# Patient Record
Sex: FEMALE | Race: WHITE | Employment: FULL TIME | URBAN - METROPOLITAN AREA
[De-identification: names, ages, dates, MRNs, and addresses within clinical notes are randomized per-mention and may not be internally consistent; named-entity substitution may affect disease eponyms.]

---

## 2021-01-22 ENCOUNTER — HOSPITAL ENCOUNTER (INPATIENT)
Facility: CLINIC | Age: 36
LOS: 10 days | Discharge: HOME | DRG: 885 | End: 2021-02-01
Attending: PSYCHIATRY & NEUROLOGY | Admitting: PSYCHIATRY & NEUROLOGY
Payer: COMMERCIAL

## 2021-01-22 ENCOUNTER — TRANSFERRED RECORDS (OUTPATIENT)
Dept: HEALTH INFORMATION MANAGEMENT | Facility: CLINIC | Age: 36
End: 2021-01-22

## 2021-01-22 ENCOUNTER — TELEPHONE (OUTPATIENT)
Dept: BEHAVIORAL HEALTH | Facility: CLINIC | Age: 36
End: 2021-01-22

## 2021-01-22 DIAGNOSIS — F41.1 GENERALIZED ANXIETY DISORDER: ICD-10-CM

## 2021-01-22 DIAGNOSIS — K59.03 DRUG-INDUCED CONSTIPATION: ICD-10-CM

## 2021-01-22 DIAGNOSIS — E55.9 VITAMIN D INSUFFICIENCY: ICD-10-CM

## 2021-01-22 DIAGNOSIS — M54.6 MIDLINE THORACIC BACK PAIN, UNSPECIFIED CHRONICITY: ICD-10-CM

## 2021-01-22 DIAGNOSIS — F33.2 SEVERE EPISODE OF RECURRENT MAJOR DEPRESSIVE DISORDER, WITHOUT PSYCHOTIC FEATURES (H): Primary | ICD-10-CM

## 2021-01-22 DIAGNOSIS — G43.909 MIGRAINE WITHOUT STATUS MIGRAINOSUS, NOT INTRACTABLE, UNSPECIFIED MIGRAINE TYPE: ICD-10-CM

## 2021-01-22 PROBLEM — F41.9 ANXIETY: Status: ACTIVE | Noted: 2021-01-22

## 2021-01-22 PROCEDURE — 250N000013 HC RX MED GY IP 250 OP 250 PS 637: Performed by: NURSE PRACTITIONER

## 2021-01-22 PROCEDURE — 250N000013 HC RX MED GY IP 250 OP 250 PS 637: Performed by: CLINICAL NURSE SPECIALIST

## 2021-01-22 PROCEDURE — 250N000011 HC RX IP 250 OP 636: Performed by: CLINICAL NURSE SPECIALIST

## 2021-01-22 PROCEDURE — 124N000002 HC R&B MH UMMC

## 2021-01-22 PROCEDURE — 90853 GROUP PSYCHOTHERAPY: CPT

## 2021-01-22 RX ORDER — MULTIPLE VITAMINS W/ MINERALS TAB 9MG-400MCG
1 TAB ORAL DAILY
Status: ON HOLD | COMMUNITY
End: 2021-01-22

## 2021-01-22 RX ORDER — TRAZODONE HYDROCHLORIDE 100 MG/1
100 TABLET ORAL AT BEDTIME
Status: ON HOLD | COMMUNITY
End: 2021-04-26

## 2021-01-22 RX ORDER — ONDANSETRON 4 MG/1
4 TABLET, ORALLY DISINTEGRATING ORAL EVERY 6 HOURS PRN
Status: DISCONTINUED | OUTPATIENT
Start: 2021-01-22 | End: 2021-01-22

## 2021-01-22 RX ORDER — MULTIPLE VITAMINS W/ MINERALS TAB 9MG-400MCG
1 TAB ORAL DAILY
Status: DISCONTINUED | OUTPATIENT
Start: 2021-01-23 | End: 2021-02-01 | Stop reason: HOSPADM

## 2021-01-22 RX ORDER — SERTRALINE HYDROCHLORIDE 100 MG/1
100 TABLET, FILM COATED ORAL EVERY MORNING
Status: DISCONTINUED | OUTPATIENT
Start: 2021-01-23 | End: 2021-01-22

## 2021-01-22 RX ORDER — ONDANSETRON 4 MG/1
4 TABLET, ORALLY DISINTEGRATING ORAL EVERY 6 HOURS PRN
COMMUNITY
End: 2022-01-10

## 2021-01-22 RX ORDER — PROPRANOLOL HYDROCHLORIDE 40 MG/1
40 TABLET ORAL 2 TIMES DAILY
Status: ON HOLD | COMMUNITY
End: 2021-02-01

## 2021-01-22 RX ORDER — OLANZAPINE 10 MG/2ML
10 INJECTION, POWDER, FOR SOLUTION INTRAMUSCULAR 3 TIMES DAILY PRN
Status: DISCONTINUED | OUTPATIENT
Start: 2021-01-22 | End: 2021-01-23

## 2021-01-22 RX ORDER — ONDANSETRON 4 MG/1
4 TABLET, ORALLY DISINTEGRATING ORAL EVERY 6 HOURS PRN
Status: DISCONTINUED | OUTPATIENT
Start: 2021-01-22 | End: 2021-02-01 | Stop reason: HOSPADM

## 2021-01-22 RX ORDER — PROPRANOLOL HYDROCHLORIDE 10 MG/1
40 TABLET ORAL 2 TIMES DAILY
Status: DISCONTINUED | OUTPATIENT
Start: 2021-01-22 | End: 2021-02-01 | Stop reason: HOSPADM

## 2021-01-22 RX ORDER — OLANZAPINE 10 MG/1
10 TABLET ORAL 3 TIMES DAILY PRN
Status: DISCONTINUED | OUTPATIENT
Start: 2021-01-22 | End: 2021-01-23

## 2021-01-22 RX ORDER — ALBUTEROL SULFATE 90 UG/1
2 AEROSOL, METERED RESPIRATORY (INHALATION) EVERY 4 HOURS PRN
Status: DISCONTINUED | OUTPATIENT
Start: 2021-01-22 | End: 2021-02-01 | Stop reason: HOSPADM

## 2021-01-22 RX ORDER — ALBUTEROL SULFATE 90 UG/1
2 AEROSOL, METERED RESPIRATORY (INHALATION) EVERY 4 HOURS PRN
Status: ON HOLD | COMMUNITY
End: 2021-04-26

## 2021-01-22 RX ORDER — HYDROXYZINE HYDROCHLORIDE 50 MG/1
50-100 TABLET, FILM COATED ORAL EVERY 4 HOURS PRN
Status: DISCONTINUED | OUTPATIENT
Start: 2021-01-22 | End: 2021-02-01 | Stop reason: HOSPADM

## 2021-01-22 RX ORDER — ACETAMINOPHEN 325 MG/1
650 TABLET ORAL EVERY 4 HOURS PRN
Status: DISCONTINUED | OUTPATIENT
Start: 2021-01-22 | End: 2021-02-01 | Stop reason: HOSPADM

## 2021-01-22 RX ORDER — SERTRALINE HYDROCHLORIDE 100 MG/1
125 TABLET, FILM COATED ORAL EVERY MORNING
Status: ON HOLD | COMMUNITY
End: 2021-02-01

## 2021-01-22 RX ORDER — TRAZODONE HYDROCHLORIDE 100 MG/1
100 TABLET ORAL AT BEDTIME
Status: DISCONTINUED | OUTPATIENT
Start: 2021-01-22 | End: 2021-02-01 | Stop reason: HOSPADM

## 2021-01-22 RX ORDER — QUETIAPINE FUMARATE 100 MG/1
100 TABLET, FILM COATED ORAL
Status: DISCONTINUED | OUTPATIENT
Start: 2021-01-22 | End: 2021-02-01 | Stop reason: HOSPADM

## 2021-01-22 RX ORDER — MAGNESIUM HYDROXIDE/ALUMINUM HYDROXICE/SIMETHICONE 120; 1200; 1200 MG/30ML; MG/30ML; MG/30ML
30 SUSPENSION ORAL EVERY 4 HOURS PRN
Status: DISCONTINUED | OUTPATIENT
Start: 2021-01-22 | End: 2021-02-01 | Stop reason: HOSPADM

## 2021-01-22 RX ORDER — AMOXICILLIN 250 MG
1 CAPSULE ORAL 2 TIMES DAILY PRN
Status: DISCONTINUED | OUTPATIENT
Start: 2021-01-22 | End: 2021-02-01 | Stop reason: HOSPADM

## 2021-01-22 RX ADMIN — TRAZODONE HYDROCHLORIDE 100 MG: 100 TABLET ORAL at 22:13

## 2021-01-22 RX ADMIN — DIBASIC SODIUM PHOSPHATE, MONOBASIC POTASSIUM PHOSPHATE AND MONOBASIC SODIUM PHOSPHATE 250 MG: 852; 155; 130 TABLET ORAL at 22:13

## 2021-01-22 RX ADMIN — ONDANSETRON 4 MG: 4 TABLET, ORALLY DISINTEGRATING ORAL at 21:49

## 2021-01-22 RX ADMIN — HYDROXYZINE HYDROCHLORIDE 100 MG: 50 TABLET, FILM COATED ORAL at 22:13

## 2021-01-22 RX ADMIN — PROPRANOLOL HYDROCHLORIDE 40 MG: 10 TABLET ORAL at 22:13

## 2021-01-22 ASSESSMENT — MIFFLIN-ST. JEOR: SCORE: 1483.79

## 2021-01-22 ASSESSMENT — ACTIVITIES OF DAILY LIVING (ADL)
ORAL_HYGIENE: INDEPENDENT
HYGIENE/GROOMING: INDEPENDENT
DRESS: INDEPENDENT
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
## 2021-01-23 LAB
ALBUMIN SERPL-MCNC: 2.9 G/DL (ref 3.4–5)
ALP SERPL-CCNC: 34 U/L (ref 40–150)
ALT SERPL W P-5'-P-CCNC: 29 U/L (ref 0–50)
ANION GAP SERPL CALCULATED.3IONS-SCNC: 6 MMOL/L (ref 3–14)
AST SERPL W P-5'-P-CCNC: 15 U/L (ref 0–45)
B-HCG SERPL-ACNC: <1 IU/L (ref 0–5)
BASOPHILS # BLD AUTO: 0 10E9/L (ref 0–0.2)
BASOPHILS NFR BLD AUTO: 0.5 %
BILIRUB SERPL-MCNC: 0.5 MG/DL (ref 0.2–1.3)
BUN SERPL-MCNC: 13 MG/DL (ref 7–30)
CALCIUM SERPL-MCNC: 8.2 MG/DL (ref 8.5–10.1)
CHLORIDE SERPL-SCNC: 113 MMOL/L (ref 94–109)
CHOLEST SERPL-MCNC: 110 MG/DL
CO2 SERPL-SCNC: 27 MMOL/L (ref 20–32)
CREAT SERPL-MCNC: 0.77 MG/DL (ref 0.52–1.04)
DIFFERENTIAL METHOD BLD: ABNORMAL
EOSINOPHIL # BLD AUTO: 0 10E9/L (ref 0–0.7)
EOSINOPHIL NFR BLD AUTO: 0.5 %
ERYTHROCYTE [DISTWIDTH] IN BLOOD BY AUTOMATED COUNT: 12.5 % (ref 10–15)
GFR SERPL CREATININE-BSD FRML MDRD: >90 ML/MIN/{1.73_M2}
GLUCOSE SERPL-MCNC: 86 MG/DL (ref 70–99)
HCT VFR BLD AUTO: 34.4 % (ref 35–47)
HDLC SERPL-MCNC: 33 MG/DL
HGB BLD-MCNC: 11.4 G/DL (ref 11.7–15.7)
IMM GRANULOCYTES # BLD: 0 10E9/L (ref 0–0.4)
IMM GRANULOCYTES NFR BLD: 0.3 %
LDLC SERPL CALC-MCNC: 61 MG/DL
LYMPHOCYTES # BLD AUTO: 3.3 10E9/L (ref 0.8–5.3)
LYMPHOCYTES NFR BLD AUTO: 51.6 %
MAGNESIUM SERPL-MCNC: 2.1 MG/DL (ref 1.6–2.3)
MCH RBC QN AUTO: 31.3 PG (ref 26.5–33)
MCHC RBC AUTO-ENTMCNC: 33.1 G/DL (ref 31.5–36.5)
MCV RBC AUTO: 95 FL (ref 78–100)
MONOCYTES # BLD AUTO: 0.5 10E9/L (ref 0–1.3)
MONOCYTES NFR BLD AUTO: 7 %
NEUTROPHILS # BLD AUTO: 2.6 10E9/L (ref 1.6–8.3)
NEUTROPHILS NFR BLD AUTO: 40.1 %
NONHDLC SERPL-MCNC: 77 MG/DL
NRBC # BLD AUTO: 0 10*3/UL
NRBC BLD AUTO-RTO: 0 /100
PHOSPHATE SERPL-MCNC: 3.7 MG/DL (ref 2.5–4.5)
PLATELET # BLD AUTO: 161 10E9/L (ref 150–450)
POTASSIUM SERPL-SCNC: 3.7 MMOL/L (ref 3.4–5.3)
PROT SERPL-MCNC: 5.7 G/DL (ref 6.8–8.8)
RBC # BLD AUTO: 3.64 10E12/L (ref 3.8–5.2)
SODIUM SERPL-SCNC: 146 MMOL/L (ref 133–144)
TRIGL SERPL-MCNC: 78 MG/DL
TSH SERPL DL<=0.005 MIU/L-ACNC: 0.79 MU/L (ref 0.4–4)
WBC # BLD AUTO: 6.4 10E9/L (ref 4–11)

## 2021-01-23 PROCEDURE — H2032 ACTIVITY THERAPY, PER 15 MIN: HCPCS

## 2021-01-23 PROCEDURE — 36415 COLL VENOUS BLD VENIPUNCTURE: CPT | Performed by: NURSE PRACTITIONER

## 2021-01-23 PROCEDURE — 250N000013 HC RX MED GY IP 250 OP 250 PS 637: Performed by: PSYCHIATRY & NEUROLOGY

## 2021-01-23 PROCEDURE — 250N000011 HC RX IP 250 OP 636: Performed by: CLINICAL NURSE SPECIALIST

## 2021-01-23 PROCEDURE — 80053 COMPREHEN METABOLIC PANEL: CPT | Performed by: NURSE PRACTITIONER

## 2021-01-23 PROCEDURE — 83735 ASSAY OF MAGNESIUM: CPT | Performed by: NURSE PRACTITIONER

## 2021-01-23 PROCEDURE — 99222 1ST HOSP IP/OBS MODERATE 55: CPT | Mod: 95 | Performed by: CLINICAL NURSE SPECIALIST

## 2021-01-23 PROCEDURE — 99207 PR CONSULT E&M CHANGED TO SUBSEQUENT LEVEL: CPT | Performed by: NURSE PRACTITIONER

## 2021-01-23 PROCEDURE — 250N000013 HC RX MED GY IP 250 OP 250 PS 637: Performed by: CLINICAL NURSE SPECIALIST

## 2021-01-23 PROCEDURE — 85025 COMPLETE CBC W/AUTO DIFF WBC: CPT | Performed by: NURSE PRACTITIONER

## 2021-01-23 PROCEDURE — 80061 LIPID PANEL: CPT | Performed by: NURSE PRACTITIONER

## 2021-01-23 PROCEDURE — 84443 ASSAY THYROID STIM HORMONE: CPT | Performed by: NURSE PRACTITIONER

## 2021-01-23 PROCEDURE — 84100 ASSAY OF PHOSPHORUS: CPT | Performed by: NURSE PRACTITIONER

## 2021-01-23 PROCEDURE — 250N000013 HC RX MED GY IP 250 OP 250 PS 637: Performed by: NURSE PRACTITIONER

## 2021-01-23 PROCEDURE — 84702 CHORIONIC GONADOTROPIN TEST: CPT | Performed by: NURSE PRACTITIONER

## 2021-01-23 PROCEDURE — 99232 SBSQ HOSP IP/OBS MODERATE 35: CPT | Performed by: NURSE PRACTITIONER

## 2021-01-23 PROCEDURE — 124N000002 HC R&B MH UMMC

## 2021-01-23 RX ORDER — LORAZEPAM 0.5 MG/1
0.5 TABLET ORAL DAILY PRN
Status: DISCONTINUED | OUTPATIENT
Start: 2021-01-23 | End: 2021-01-25

## 2021-01-23 RX ORDER — OLANZAPINE 5 MG/1
5-10 TABLET ORAL 3 TIMES DAILY PRN
Status: DISCONTINUED | OUTPATIENT
Start: 2021-01-23 | End: 2021-02-01 | Stop reason: HOSPADM

## 2021-01-23 RX ORDER — OLANZAPINE 10 MG/2ML
10 INJECTION, POWDER, FOR SOLUTION INTRAMUSCULAR 3 TIMES DAILY PRN
Status: DISCONTINUED | OUTPATIENT
Start: 2021-01-23 | End: 2021-02-01 | Stop reason: HOSPADM

## 2021-01-23 RX ORDER — GABAPENTIN 100 MG/1
100 CAPSULE ORAL 3 TIMES DAILY
Status: DISCONTINUED | OUTPATIENT
Start: 2021-01-23 | End: 2021-01-25

## 2021-01-23 RX ORDER — ESCITALOPRAM OXALATE 10 MG/1
10 TABLET ORAL DAILY
Status: DISCONTINUED | OUTPATIENT
Start: 2021-01-24 | End: 2021-02-01 | Stop reason: HOSPADM

## 2021-01-23 RX ADMIN — TRAZODONE HYDROCHLORIDE 100 MG: 100 TABLET ORAL at 22:07

## 2021-01-23 RX ADMIN — DIBASIC SODIUM PHOSPHATE, MONOBASIC POTASSIUM PHOSPHATE AND MONOBASIC SODIUM PHOSPHATE 250 MG: 852; 155; 130 TABLET ORAL at 19:37

## 2021-01-23 RX ADMIN — MULTIPLE VITAMINS W/ MINERALS TAB 1 TABLET: TAB at 08:19

## 2021-01-23 RX ADMIN — PROPRANOLOL HYDROCHLORIDE 40 MG: 10 TABLET ORAL at 19:38

## 2021-01-23 RX ADMIN — SERTRALINE HYDROCHLORIDE 125 MG: 100 TABLET ORAL at 08:20

## 2021-01-23 RX ADMIN — ACETAMINOPHEN 650 MG: 325 TABLET, FILM COATED ORAL at 19:38

## 2021-01-23 RX ADMIN — GABAPENTIN 100 MG: 100 CAPSULE ORAL at 19:37

## 2021-01-23 RX ADMIN — ONDANSETRON 4 MG: 4 TABLET, ORALLY DISINTEGRATING ORAL at 09:03

## 2021-01-23 RX ADMIN — HYDROXYZINE HYDROCHLORIDE 100 MG: 50 TABLET, FILM COATED ORAL at 20:17

## 2021-01-23 RX ADMIN — HYDROXYZINE HYDROCHLORIDE 100 MG: 50 TABLET, FILM COATED ORAL at 09:30

## 2021-01-23 RX ADMIN — DIBASIC SODIUM PHOSPHATE, MONOBASIC POTASSIUM PHOSPHATE AND MONOBASIC SODIUM PHOSPHATE 250 MG: 852; 155; 130 TABLET ORAL at 03:51

## 2021-01-23 RX ADMIN — DIBASIC SODIUM PHOSPHATE, MONOBASIC POTASSIUM PHOSPHATE AND MONOBASIC SODIUM PHOSPHATE 250 MG: 852; 155; 130 TABLET ORAL at 09:27

## 2021-01-23 RX ADMIN — GABAPENTIN 100 MG: 100 CAPSULE ORAL at 14:33

## 2021-01-23 RX ADMIN — PROPRANOLOL HYDROCHLORIDE 40 MG: 10 TABLET ORAL at 08:20

## 2021-01-23 RX ADMIN — ONDANSETRON 4 MG: 4 TABLET, ORALLY DISINTEGRATING ORAL at 17:01

## 2021-01-23 ASSESSMENT — ACTIVITIES OF DAILY LIVING (ADL)
ORAL_HYGIENE: INDEPENDENT
HYGIENE/GROOMING: INDEPENDENT
LAUNDRY: WITH SUPERVISION
DRESS: INDEPENDENT
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
## 2021-01-23 NOTE — PROGRESS NOTES
Pt slept for 6.50 hours.  Breathing was even and unlabored.  Up at 0400,took scheduled medication without any problems.  Had no medical concerns.
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
## 2021-01-23 NOTE — PHARMACY-ADMISSION MEDICATION HISTORY
Admission Medication History Completed by Pharmacy    See [facility] Admission Navigator for allergy information, preferred outpatient pharmacy, prior to admission medications and immunization status.     Medication History Sources:     Patient    Changes made to PTA medication list (reason):    Added: None    Deleted: None    Changed:   o Sertraline 100 mg to 125 mg daily - patient reports that this was recently increased on 1/20/21.    Additional Information:    Unable to verify medications with outside records. PTA list updated solely on patient report.    [facility] fill history:  o November 2020: Trazodone take 25-50 mg at bedtime  o September 2020:  Sertraline take 50 mg daily  o May 2020: Propanolol take 20 mg at bedtime for a week, then increase by 20 mg weekly until 40 mg twice daily.    Patient reports filling at [facility] for the past couple of months now. Not open at the time, called a 24 hour [facility] and they report no history or any fills.     Prior to Admission medications    Medication Sig Last Dose Taking? Auth Provider   albuterol (PROAIR HFA/PROVENTIL HFA/VENTOLIN HFA) 108 (90 Base) MCG/ACT inhaler Inhale 2 puffs into the lungs every 4 hours as needed for shortness of breath / dyspnea or wheezing (only needed since recovering from covid several months ago) Past Month Yes Reported, Patient   Multiple Vitamins-Minerals (MULTIVITAMIN GUMMIES ADULT PO) Take 4 tablets by mouth daily 1/20/2021 Yes Reported, Patient   ondansetron (ZOFRAN-ODT) 4 MG ODT tab Take 4 mg by mouth every 6 hours as needed for nausea or vomiting Not started Yes Reported, Patient   propranolol (INDERAL) 40 MG tablet Take 40 mg by mouth 2 times daily Hold for heart rate less than 40 - patient has been bradycardic without complications since starting this medication for post-covid migraines several months ago 1/21/2021 Yes Reported, Patient   sertraline (ZOLOFT) 100 MG tablet Take 125 mg by mouth every morning  1/21/2021 Yes  Reported, Patient   traZODone (DESYREL) 100 MG tablet Take 100 mg by mouth At Bedtime 1/21/2021 Yes Reported, Patient       Date completed: 01/22/21    Medication history completed by: Dorie Boswell

## 2021-01-23 NOTE — PROGRESS NOTES
01/22/21 2041   Patient Belongings   Patient Belongings locker   Patient Belongings Put in Hospital Secure Location (Security or Locker, etc.) cash/credit card;clothing;keys;shoes   Belongings Search Yes   Clothing Search Yes   Second Staff Keyshawn Rivera Sr. Psych Associate     The following items are being kept in a secure unit locker:  One pair of winter boots (with laces)  One [employer] winter hat  One [employer] face mask  Two sets of keys  One [place] ID Card  One small bottle of eye drops  One T-shirt  One sweatshirt (with drawstring)  One pair of sweat pants (with drawstring)  $1.59 in change    The following items are being sent to [employer]: Envelope [id number]  One Visa (6472)  Two signed checks    A               Admission:  I am responsible for any personal items that are not sent to the safe or pharmacy.  [facility] is not responsible for loss, theft or damage of any property in my possession.    Signature:  _________________________________ Date: _______  Time: _____                                              Staff Signature:  ____________________________ Date: ________  Time: _____      2nd Staff person, if patient is unable/unwilling to sign:    Signature: ________________________________ Date: ________  Time: _____     Discharge:  [facility] has returned all of my personal belongings:    Signature: _________________________________ Date: ________  Time: _____                                          Staff Signature:  ____________________________ Date: ________  Time: _____
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
## 2021-01-23 NOTE — PROGRESS NOTES
CLINICAL NUTRITION SERVICES - ASSESSMENT NOTE     Nutrition Prescription    RECOMMENDATIONS FOR MDs/PROVIDERS TO ORDER:  For completeness of workup, may want to check a Vit D level     Malnutrition Status:    Unable to fully assess as no NFPA completed, but pt does likely at least qualify for non-severe malnutrition in the context of acute on chronic mental illness    Recommendations already ordered by Registered Dietitian (RD):  Chocolate Boost Plus TID at all meals    RD will update weight order to weekly     Future/Additional Recommendations:  Monitor meal intakes and supplement acceptance  Monitor weight and lab trends      REASON FOR ASSESSMENT  Mariusz Huntley is a/an 35 year old female assessed by the dietitian for Positive Admission Nutrition Risk Screen and Provider Order - Significant weight loss, nausea & vomiting    NUTRITION/MEDICAL HISTORY  Per chart review: Pt admits to facility in the setting of severe anxiety and depression, other diagnoses of note are persistent nausea/vomiting, bradycardia, hypophosphatemia, migraines, and recovered COVID 19 infection.    Per staff note pt reported a 95 lb wt loss since October 2020 due to vomiting, patient reports gagging when she is anxious.    Per pt visit: RD called unit and spoke with pt, reviewed reason for visit as above. Pt confirms above, reports a more UBW of around 200 lbs back in June, losing weight down to current weight, reports recent weights have ranged from 157-165 lbs. Pt reports eating an estimated 450 calorie per day in the setting of poor appetite from anxiety, pt reports would attempt po TID, but would only take bites of food and have accompanying nausea/gaging. Pt feels nausea/gaging is completely related to anxiety, pt notes no abdominal pain, diarrhea or other GI symptoms. Pt reports is taking water/fluids on the unit well now. RD reviewed labs below and nutrition related medications ordered, reviewed improving hydration status will  "likely correct sodium level, and noted phos replacement ordered along with MVI. RD suggested supplement interventions until po for regular meals starts to improve, pt agreeing to Boost at meals. RD reviewed the importance of improving oral intakes for overall improved health status, pt verbalized agreement and accepting of nutrition plan of care.     CURRENT NUTRITION ORDERS  Diet: Regular  Intake/Tolerance: Nothing recorded in EMR thus far    LABS  Results for STEVE GR (MRN [id number]) as of 1/23/2021 12:14   1/23/2021 08:05   Sodium 146 (H)   Potassium 3.7   Urea Nitrogen 13   Creatinine 0.77   Calcium 8.2 (L)   Anion Gap 6   Magnesium 2.1   Phosphorus 3.7   Albumin 2.9 (L)   Protein Total 5.7 (L)   Bilirubin Total 0.5   Alkaline Phosphatase 34 (L)   ALT 29   AST 15   Cholesterol 110   HDL Cholesterol 33 (L)   LDL Cholesterol Calculated 61   Non HDL Cholesterol 77   Triglycerides 78   TSH 0.79     MEDICATIONS  Zoloft, PRN Zofran, PRN Zyprexa, PRN Seroquel, Phos tablet, MVI    ANTHROPOMETRICS  Height: 170.2 cm (5' 7\")  Most Recent Weight: 75.1 kg (165 lb 9.6 oz)    IBW: 61.4 kg  BMI: Overweight BMI 25-29.9  Weight History: Pt reports a more UBW of around 200 lbs in June 2020    Weight assessment: If weight trends reported above accurate, pt has had a significant 17.5% weight loss in >6 months.     Dosing Weight: 75.1 kg    ASSESSED NUTRITION NEEDS  Estimated Energy Needs: 4143-4390 kcals/day (25 - 30 kcals/kg)  Justification: Maintenance  Estimated Protein Needs: 60-75 grams protein/day (0.8 - 1 grams of pro/kg)  Justification: Maintenance  Estimated Fluid Needs: 2255 mL/day (30 mL/kg)   Justification: Maintenance    MALNUTRITION  % Intake: < 75% for >/= 3 months (non-severe)  % Weight Loss: > 10% in 6 months (severe)  Subcutaneous Fat Loss: Unable to assess  Muscle Loss: Unable to assess  Fluid Accumulation/Edema: None noted  Malnutrition Diagnosis: Unable to fully assess as no NFPA completed, but pt " does likely at least qualify for non-severe malnutrition in the context of acute on chronic mental illness     NUTRITION DIAGNOSIS  Inadequate protein-energy intake related to mental illness as evidenced by pt report with a significant 17.5% weight loss in >6 months    INTERVENTIONS  Implementation  Nutrition Education: RD reviewed the importance of adequate nutritional intakes for improved health status    Medical food supplement therapy - ordered as above      Goals  Patient to consume % of nutritionally adequate meal trays TID, or the equivalent with supplements/snacks.     Monitoring/Evaluation  Progress toward goals will be monitored and evaluated per protocol.    Natty Parnell RD, LD  [facility] RD pager: [phone]

## 2021-01-24 LAB
ANION GAP SERPL CALCULATED.3IONS-SCNC: 6 MMOL/L (ref 3–14)
BUN SERPL-MCNC: 15 MG/DL (ref 7–30)
CALCIUM SERPL-MCNC: 8.2 MG/DL (ref 8.5–10.1)
CHLORIDE SERPL-SCNC: 111 MMOL/L (ref 94–109)
CO2 SERPL-SCNC: 27 MMOL/L (ref 20–32)
CREAT SERPL-MCNC: 0.79 MG/DL (ref 0.52–1.04)
GFR SERPL CREATININE-BSD FRML MDRD: >90 ML/MIN/{1.73_M2}
GLUCOSE SERPL-MCNC: 88 MG/DL (ref 70–99)
PHOSPHATE SERPL-MCNC: 4.1 MG/DL (ref 2.5–4.5)
POTASSIUM SERPL-SCNC: 3.4 MMOL/L (ref 3.4–5.3)
SODIUM SERPL-SCNC: 144 MMOL/L (ref 133–144)

## 2021-01-24 PROCEDURE — 250N000011 HC RX IP 250 OP 636: Performed by: CLINICAL NURSE SPECIALIST

## 2021-01-24 PROCEDURE — 84100 ASSAY OF PHOSPHORUS: CPT | Performed by: NURSE PRACTITIONER

## 2021-01-24 PROCEDURE — G0177 OPPS/PHP; TRAIN & EDUC SERV: HCPCS

## 2021-01-24 PROCEDURE — 80048 BASIC METABOLIC PNL TOTAL CA: CPT | Performed by: NURSE PRACTITIONER

## 2021-01-24 PROCEDURE — 250N000013 HC RX MED GY IP 250 OP 250 PS 637: Performed by: NURSE PRACTITIONER

## 2021-01-24 PROCEDURE — 82306 VITAMIN D 25 HYDROXY: CPT | Performed by: NURSE PRACTITIONER

## 2021-01-24 PROCEDURE — 124N000002 HC R&B MH UMMC

## 2021-01-24 PROCEDURE — 250N000013 HC RX MED GY IP 250 OP 250 PS 637: Performed by: CLINICAL NURSE SPECIALIST

## 2021-01-24 PROCEDURE — 36415 COLL VENOUS BLD VENIPUNCTURE: CPT | Performed by: NURSE PRACTITIONER

## 2021-01-24 RX ADMIN — PROPRANOLOL HYDROCHLORIDE 40 MG: 10 TABLET ORAL at 20:11

## 2021-01-24 RX ADMIN — GABAPENTIN 100 MG: 100 CAPSULE ORAL at 20:11

## 2021-01-24 RX ADMIN — GABAPENTIN 100 MG: 100 CAPSULE ORAL at 07:57

## 2021-01-24 RX ADMIN — TRAZODONE HYDROCHLORIDE 100 MG: 100 TABLET ORAL at 22:26

## 2021-01-24 RX ADMIN — ONDANSETRON 4 MG: 4 TABLET, ORALLY DISINTEGRATING ORAL at 18:16

## 2021-01-24 RX ADMIN — HYDROXYZINE HYDROCHLORIDE 100 MG: 50 TABLET, FILM COATED ORAL at 12:59

## 2021-01-24 RX ADMIN — MULTIPLE VITAMINS W/ MINERALS TAB 1 TABLET: TAB at 07:57

## 2021-01-24 RX ADMIN — GABAPENTIN 100 MG: 100 CAPSULE ORAL at 13:00

## 2021-01-24 RX ADMIN — HYDROXYZINE HYDROCHLORIDE 100 MG: 50 TABLET, FILM COATED ORAL at 20:11

## 2021-01-24 RX ADMIN — SERTRALINE HYDROCHLORIDE 75 MG: 50 TABLET ORAL at 07:57

## 2021-01-24 RX ADMIN — ONDANSETRON 4 MG: 4 TABLET, ORALLY DISINTEGRATING ORAL at 07:57

## 2021-01-24 RX ADMIN — ESCITALOPRAM OXALATE 10 MG: 10 TABLET ORAL at 07:56

## 2021-01-24 RX ADMIN — PROPRANOLOL HYDROCHLORIDE 40 MG: 10 TABLET ORAL at 07:56

## 2021-01-24 ASSESSMENT — ACTIVITIES OF DAILY LIVING (ADL)
ORAL_HYGIENE: INDEPENDENT
DRESS: INDEPENDENT
HYGIENE/GROOMING: SHOWER;INDEPENDENT
ORAL_HYGIENE: INDEPENDENT
DRESS: INDEPENDENT
LAUNDRY: WITH SUPERVISION
HYGIENE/GROOMING: INDEPENDENT

## 2021-01-24 ASSESSMENT — MIFFLIN-ST. JEOR: SCORE: 1488.78

## 2021-01-24 NOTE — PROGRESS NOTES
Mariusz participated in a creative journaling group this evening, actively reflecting on the prompts offered and sharing several of her written reflections with the group. Comments superficial but relevant. Pt was polite and agreeable throughout session.      01/24/21 1700   Occupational Therapy   Type of Intervention structured groups   Response Initiates, socially acceptable   Hours 1

## 2021-01-24 NOTE — PLAN OF CARE
"  Problem: Mood Impairment (Anxiety Signs/Symptoms)  Goal: Improved Mood Symptoms (Anxiety Signs/Symptoms)  Outcome: Improving  Flowsheets (Taken 1/24/2021 0002)  Mutually Determined Action Steps (Improved Mood Symptoms):    adheres to medication regimen    shares insight re: need for meds   Patient reported having a busy day with much new information to process.  She denied suicidal ideation, hallucinations, and medication side effects; said she was pleased with zoloft/celexa cross taper plan and addition of Gabapentin.  Patient was given PRN zofran prior to dinner and reported she was able to eat \"all the lasagna and the boost.  What an improvement - I'd been averaging 450 calories a day due to nausea.\"  She received tylenol for headache at 1938 and reported relief later. Patient left group at 2015 and said her anxiety was very high.  She was given ice water and Vistaril 100 mg and returned to group as they were coloring, which is one of her anxiety reduction skills.  Patient later reported anxiety had been reduced to manageable levels.  She was encouraged to notify staff if she awakens in the middle of the night so she can obtain PRN Seroquel.  Order obtained per RD suggestion for Vitamin D level tomorrow.  "
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
## 2021-01-24 NOTE — PLAN OF CARE
"  Problem: General Rehab Plan of Care  Goal: Therapeutic Recreation/Music Therapy Goal (Art Therapy)  Description: The patient and/or their representative will achieve their patient-specific goals related to the plan of care.  The patient-specific goals include: emotional expression  Outcome: No Change   AT directive: \"Feelings in Colors.\" Pts are encouraged to express eight suggested feelings with art medium of choice using lines, shapes and colors.  Goals of directive: general mindfulness, mindfulness of emotions, emotional expression     Pt was a quiet participant, focused on task for the full duration of group. Pt at one point excused herself from group saying that she felt anxious and needed to get medication from her nurse. Pt shortly later returned to group and finished feelings drawings. Pt briefly shared her imagery with group.  Pts mood was calm, pleasant participant.  "

## 2021-01-25 LAB — DEPRECATED CALCIDIOL+CALCIFEROL SERPL-MC: 17 UG/L (ref 20–75)

## 2021-01-25 PROCEDURE — G0177 OPPS/PHP; TRAIN & EDUC SERV: HCPCS

## 2021-01-25 PROCEDURE — 250N000013 HC RX MED GY IP 250 OP 250 PS 637: Performed by: NURSE PRACTITIONER

## 2021-01-25 PROCEDURE — H2032 ACTIVITY THERAPY, PER 15 MIN: HCPCS

## 2021-01-25 PROCEDURE — 124N000002 HC R&B MH UMMC

## 2021-01-25 PROCEDURE — 250N000011 HC RX IP 250 OP 636: Performed by: CLINICAL NURSE SPECIALIST

## 2021-01-25 PROCEDURE — 99232 SBSQ HOSP IP/OBS MODERATE 35: CPT | Performed by: NURSE PRACTITIONER

## 2021-01-25 PROCEDURE — 250N000013 HC RX MED GY IP 250 OP 250 PS 637: Performed by: CLINICAL NURSE SPECIALIST

## 2021-01-25 RX ORDER — VITAMIN B COMPLEX
50 TABLET ORAL DAILY
Status: DISCONTINUED | OUTPATIENT
Start: 2021-01-25 | End: 2021-02-01 | Stop reason: HOSPADM

## 2021-01-25 RX ORDER — GABAPENTIN 300 MG/1
300 CAPSULE ORAL 3 TIMES DAILY
Status: DISCONTINUED | OUTPATIENT
Start: 2021-01-25 | End: 2021-01-27

## 2021-01-25 RX ADMIN — MULTIPLE VITAMINS W/ MINERALS TAB 1 TABLET: TAB at 08:57

## 2021-01-25 RX ADMIN — GABAPENTIN 300 MG: 300 CAPSULE ORAL at 20:00

## 2021-01-25 RX ADMIN — GABAPENTIN 300 MG: 300 CAPSULE ORAL at 14:03

## 2021-01-25 RX ADMIN — SERTRALINE HYDROCHLORIDE 75 MG: 50 TABLET ORAL at 08:57

## 2021-01-25 RX ADMIN — PROPRANOLOL HYDROCHLORIDE 40 MG: 10 TABLET ORAL at 20:00

## 2021-01-25 RX ADMIN — HYDROXYZINE HYDROCHLORIDE 100 MG: 50 TABLET, FILM COATED ORAL at 20:00

## 2021-01-25 RX ADMIN — HYDROXYZINE HYDROCHLORIDE 100 MG: 50 TABLET, FILM COATED ORAL at 07:14

## 2021-01-25 RX ADMIN — PROPRANOLOL HYDROCHLORIDE 40 MG: 10 TABLET ORAL at 08:57

## 2021-01-25 RX ADMIN — Medication 50 MCG: at 14:03

## 2021-01-25 RX ADMIN — TRAZODONE HYDROCHLORIDE 100 MG: 100 TABLET ORAL at 22:36

## 2021-01-25 RX ADMIN — GABAPENTIN 300 MG: 300 CAPSULE ORAL at 08:57

## 2021-01-25 RX ADMIN — ESCITALOPRAM OXALATE 10 MG: 10 TABLET ORAL at 08:57

## 2021-01-25 RX ADMIN — ACETAMINOPHEN 650 MG: 325 TABLET, FILM COATED ORAL at 10:29

## 2021-01-25 RX ADMIN — ONDANSETRON 4 MG: 4 TABLET, ORALLY DISINTEGRATING ORAL at 07:01

## 2021-01-25 ASSESSMENT — ACTIVITIES OF DAILY LIVING (ADL)
HYGIENE/GROOMING: INDEPENDENT
DRESS: INDEPENDENT
ORAL_HYGIENE: INDEPENDENT
ORAL_HYGIENE: INDEPENDENT
HYGIENE/GROOMING: INDEPENDENT
LAUNDRY: WITH SUPERVISION
DRESS: INDEPENDENT

## 2021-01-25 NOTE — PLAN OF CARE
Initial OT Note:    Attended 2 OT Creative Expression Groups. Initiated groups and tasks. Participated x 45 minutes each session with 3 others present. Bright, pleasant and social/engaged. Decisive and able to follow 3 step verbal directions with good results. Attentive to details, planning and organization of simple task. Noted the benefits she derives from creative expression participation here, at home and with kids. Noted she was feeling overwhelmed with responsibilities. Had positive outlook and was looking forward to refreshing and learning healthy coping skills.     Self Assessment Form:  Pt was given and completed a written self assessment. Noted she had been experiencing the following: anxiety, racing thoughts, cognitive deficits, behaviors she felt compelled to do and forced vomiting prior to admission. Goals identified included: identifying and expressing feelings, look at self destructive behaviors, find supports/resources, improve concentration, manage anxiety and resume functioning. Supports identified as: , friends and parents.    OT purpose was explained with a value of having involvement in tx plan, and provided options to meet self identified goals. Will assess further in the areas of organization, problem solving, and concentration.     Will continue to assess, encourage group attendance/participation for increased self awareness and exploration of healthy coping skills.
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
## 2021-01-25 NOTE — PLAN OF CARE
Pt attended mh education group. Discussed the autobiography in 5 chapters poem and then stages of change. Pt participated actively, discussed change they wanted to make in their life and action they could take to create change.

## 2021-01-25 NOTE — PLAN OF CARE
Problem: Mood Impairment (Anxiety Signs/Symptoms)  Goal: Improved Mood Symptoms (Anxiety Signs/Symptoms)  Outcome: Improving  Flowsheets (Taken 1/25/2021 0014)  Mutually Determined Action Steps (Improved Mood Symptoms):    adheres to medication regimen    shares insight re: need for meds   Patient reported a low stress day; enjoyed watching football games on tv.  She requested PRN Vistaril at almost the same time (six minute difference) that she had the panic attack yesterday evening and had to leave group.  She again reported relief from Vistaril.  Patient stated she often has increased anxiety or panic attack at that time at home; unable to cite specific trigger other than lots of activity at this time of the evening.  She continued to deny suicidal ideation, stated depression is low and she has no new physical symptoms since starting Celexa.  Patient again received PRN zofran before dinner.  She was able to eat about 25% of the entree but ate well later when snacks were served.  Patient was strongly encouraged to request prn seroquel if she has nightmares again tonight.
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
## 2021-01-25 NOTE — PROGRESS NOTES
"   01/25/21 0826   Sitting Orthostatic BP   Sitting Orthostatic /55   Sitting Orthostatic Pulse 51 bpm   Standing Orthostatic BP   Standing Orthostatic /73   Standing Orthostatic Pulse 54 bpm     Patient reported some sx of feeling dizzy/lightheaded from sitting to standing this am. Did have prn hydroxyzine and ondansetron earlier this am for nausea and anxiety with improvement in those sx. Although she still met ordered parameters for given scheduled propanolol, did discuss with provider and ok'd to give scheduled dose.   Instructed patient to increase oral fluids and change positions slowly to prevent fall.  She was able to eat a full breakfast today and reported feeling better afterwards. Discussed medication dose changes today. She has been tolerating these without reported SEs. Was able to eat lunch without prn medication for nausea ahead of time and had no issues with nausea afterwards.     She is alert and fully oriented. Mood is anxious. Affect tense but pleasant. She denies any SI or self harm ideation. Had some mid-upper back tightness \"like I pulled a muscle there\" and prn Tylenol given. No vomiting this shift. Has been attending and participating in groups. Social with peers. Reported \"vivid dreams\" last night but no nightmares. No further reports of lightheadedness. Reported that her anxiety seemed greatly improved with increased dose of scheduled gabapentin. Vitamin D replacement ordered and started for low vitamin D level.   "

## 2021-01-25 NOTE — PROGRESS NOTES
"Patient woke up at 0700 and reported she felt \"nauseous and anxious\". Patient requested PRN zofran for nausea and hydroxyzine for anxiety. She stated she wanted to take the zofran first and would request the hydroxyzine once the nausea had decreased. PRN zofran administered as requested/ordered (see MAR). Patient reconfirmed she would communicate with the nurses when she was ready to take hydroxyzine, if needed. Will continue to monitor.    At approximately 0715, patient stated she was \"ready for the hydroxyzine\" to help with her anxiety. PRN hydroxyzine administered as ordered (see MAR).  "

## 2021-01-26 PROCEDURE — 250N000013 HC RX MED GY IP 250 OP 250 PS 637: Performed by: PSYCHIATRY & NEUROLOGY

## 2021-01-26 PROCEDURE — 124N000002 HC R&B MH UMMC

## 2021-01-26 PROCEDURE — G0177 OPPS/PHP; TRAIN & EDUC SERV: HCPCS

## 2021-01-26 PROCEDURE — 250N000013 HC RX MED GY IP 250 OP 250 PS 637: Performed by: NURSE PRACTITIONER

## 2021-01-26 PROCEDURE — 250N000013 HC RX MED GY IP 250 OP 250 PS 637: Performed by: CLINICAL NURSE SPECIALIST

## 2021-01-26 PROCEDURE — 99232 SBSQ HOSP IP/OBS MODERATE 35: CPT | Performed by: NURSE PRACTITIONER

## 2021-01-26 RX ORDER — LANOLIN ALCOHOL/MO/W.PET/CERES
3-6 CREAM (GRAM) TOPICAL
Status: DISCONTINUED | OUTPATIENT
Start: 2021-01-26 | End: 2021-02-01 | Stop reason: HOSPADM

## 2021-01-26 RX ORDER — CYCLOBENZAPRINE HCL 5 MG
5 TABLET ORAL 3 TIMES DAILY PRN
Status: DISCONTINUED | OUTPATIENT
Start: 2021-01-26 | End: 2021-02-01 | Stop reason: HOSPADM

## 2021-01-26 RX ADMIN — Medication 50 MCG: at 08:52

## 2021-01-26 RX ADMIN — TRAZODONE HYDROCHLORIDE 100 MG: 100 TABLET ORAL at 21:43

## 2021-01-26 RX ADMIN — GABAPENTIN 300 MG: 300 CAPSULE ORAL at 21:42

## 2021-01-26 RX ADMIN — MULTIPLE VITAMINS W/ MINERALS TAB 1 TABLET: TAB at 08:52

## 2021-01-26 RX ADMIN — PROPRANOLOL HYDROCHLORIDE 40 MG: 10 TABLET ORAL at 21:43

## 2021-01-26 RX ADMIN — HYDROXYZINE HYDROCHLORIDE 100 MG: 50 TABLET, FILM COATED ORAL at 12:59

## 2021-01-26 RX ADMIN — GABAPENTIN 300 MG: 300 CAPSULE ORAL at 15:07

## 2021-01-26 RX ADMIN — GABAPENTIN 300 MG: 300 CAPSULE ORAL at 08:52

## 2021-01-26 RX ADMIN — HYDROXYZINE HYDROCHLORIDE 100 MG: 50 TABLET, FILM COATED ORAL at 19:51

## 2021-01-26 RX ADMIN — CYCLOBENZAPRINE HYDROCHLORIDE 5 MG: 5 TABLET, FILM COATED ORAL at 19:23

## 2021-01-26 RX ADMIN — PROPRANOLOL HYDROCHLORIDE 40 MG: 10 TABLET ORAL at 08:52

## 2021-01-26 RX ADMIN — MELATONIN TAB 3 MG 3 MG: 3 TAB at 21:43

## 2021-01-26 RX ADMIN — SERTRALINE HYDROCHLORIDE 50 MG: 50 TABLET ORAL at 08:52

## 2021-01-26 RX ADMIN — HYDROXYZINE HYDROCHLORIDE 100 MG: 50 TABLET, FILM COATED ORAL at 03:53

## 2021-01-26 RX ADMIN — ESCITALOPRAM OXALATE 10 MG: 10 TABLET ORAL at 08:52

## 2021-01-26 RX ADMIN — ACETAMINOPHEN 650 MG: 325 TABLET, FILM COATED ORAL at 11:07

## 2021-01-26 ASSESSMENT — ACTIVITIES OF DAILY LIVING (ADL)
DRESS: SCRUBS (BEHAVIORAL HEALTH)
LAUNDRY: WITH SUPERVISION
HYGIENE/GROOMING: INDEPENDENT
ORAL_HYGIENE: INDEPENDENT
ORAL_HYGIENE: INDEPENDENT
DRESS: INDEPENDENT
HYGIENE/GROOMING: INDEPENDENT
LAUNDRY: WITH SUPERVISION

## 2021-01-26 ASSESSMENT — MIFFLIN-ST. JEOR: SCORE: 1481.97

## 2021-01-26 NOTE — PLAN OF CARE
Pt up to counter reporting anxiety due to nightmare and requested medication. Hydroxyzine prn given. Pt polite, returned to her room and laid down.

## 2021-01-26 NOTE — PLAN OF CARE
"  Problem: Mood Impairment (Anxiety Signs/Symptoms)  Goal: Improved Mood Symptoms (Anxiety Signs/Symptoms)  Outcome: Improving  Note: Patient rated anxiety at 4/10, said she may ask for Hydroxyzine later, if her anxiety gets bad.   Denied suicidal thought, thoughts of hurting self or others. Denied depression, hallucinations, or paranoia.     Patient's main concerns were verbalized as:   1. Problems with sleep at night, \"I sleep maximum of 4 hrs per night\". Patient is on Trazodone 100 mg scheduled at HS. Will consider asking the provider for PRN Melatonin.  2. Having high anxiety. Talked to patient about using alternative to medications interventions, including squire breathing, guided imagery, and meditation. Patient was receptive to try.   3. Muscle spasms at the left mid back area. Patient is asking for medications, will consider talking with provider about PRN muscle relaxant.     Patient's plan for tonight: to read and watch the Wild game on TV.   Patient did not have question for staff at this time.     She was encouraged to come to the med room for scheduled and PRN medications, she was agreeable.   Patient denied side effects of prescribed medications, none assessed at this time by staff.     Addendum: called and discussed patient's requests with on call practitioner. Orders for PRN Flexeril 5 mg PO TID PRN for muscle spasms and Melatonin 3-6 mg PO PRN at HS.   Patient took PRN Flexeril, after she rated her muscle spasm pain at 8/10 with movement, will f/u with effectiveness. She is agreeable to take Melatonin 3 mg with HS medications tonight.      Addendum: patient reported relief of her back spasm after she took PRN Flexeril.  Patient took PRN Hydroxyzine 100 mg for increased anxiety at 1951, reported that it works well for decreasing anxiety.     Addendum: patient took scheduled medications at 21:45 per her request with PRN Melatonin 3 mg PO and went to bed.        "

## 2021-01-26 NOTE — PLAN OF CARE
Attended 2 OT Creative Expression Groups, 45 minutes each session, with 2 peers present. Bright, pleasant and social. Verbalized her need to integrate creative expression activities more often when returning home. Further noted how it helps distract, promotes productive /purposeful use of time and satisfaction with out come. Eager to try new specific activity upon returning home. Futuristic goals and plans. Attentive, focused, organized and good execution of executive skills.    Afternoon group canceled due to peer divulging personal crisis info.that needed to be addressed immediately. Patient was supportive and encouraging to peer. Upon follow up noted she was good and expressed more concern for peers well being. Encouraged to ask staff for individual time if the need arises into the evening.

## 2021-01-26 NOTE — PLAN OF CARE
"Patient was jubilant after finishing supper - \"first time in months I've been able to eat the whole meal.\"  She did not receive PRN zofran prior to the meal.  Patient reports anxiety has diminished to only 2 out of 10 after receiving two doses of Gabapentin 300 mg - \"feel like I've turned the corner.\"  She continues to deny SI, new physical problems, medication side effects, and thought problems.  Affect is appropriately bright.  Patient checked out weighted blanket for use tonight in hopes it will diminish the vivid dreams that wake her up.  She stated she's enjoyed unit groups very much, in particular the CTC group today.  \"That poem really resonated with me.  I'm going to make a copy and hang it on my wall at home.\"  At 2000 patient requested and received PRN Vistaril 100 mg for report of sudden severe anxiety.  She received the same medication at 2009 and 2017 the last two evenings but cannot identify specific trigger.  Unit milieu was very calm and quiet tonight.  Vistaril again effective.  "
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
## 2021-01-26 NOTE — PLAN OF CARE
Patient said that she had very vivid dreams again last night. She endorsed anxiety at 1300 and was given Hydroxyzine 100 mg prn. Patient has been going to groups. She was compliant with all of her scheduled medications. Patient denied nausea this morning, but felt a little bit nauseated at 1300 when she was having anxiety. She put her sea bands back on and this writer told patient to ask for Zofran prn if she needed it. Patient was given Tylenol prn this morning for back ache with good relief.  Will continue to monitor and offer support.

## 2021-01-26 NOTE — PLAN OF CARE
Pt actively participated in a structured Therapeutic Recreation group with a focus on leisure participation, stress reduction, and social engagement via an active group game. Pt remained focused and engaged throughout full duration of group.  Pt was sociable and was appropriate with interactions.  Appropriately shared sense of humor with peers during the group and appeared to brighten with social interaction. Pt also was encouraging others during their turns as well. Pt was often laughing and smiling with peers during the group. Pt was very active in the tossing large, foam dice for the activity. Pt accepted some guidance, but was able to take several turns independently towards the end. Pt stated she had a lot of fun playing the game.

## 2021-01-27 PROCEDURE — 250N000013 HC RX MED GY IP 250 OP 250 PS 637: Performed by: PSYCHIATRY & NEUROLOGY

## 2021-01-27 PROCEDURE — 250N000013 HC RX MED GY IP 250 OP 250 PS 637: Performed by: NURSE PRACTITIONER

## 2021-01-27 PROCEDURE — 90853 GROUP PSYCHOTHERAPY: CPT

## 2021-01-27 PROCEDURE — 99232 SBSQ HOSP IP/OBS MODERATE 35: CPT | Performed by: NURSE PRACTITIONER

## 2021-01-27 PROCEDURE — G0177 OPPS/PHP; TRAIN & EDUC SERV: HCPCS

## 2021-01-27 PROCEDURE — 124N000002 HC R&B MH UMMC

## 2021-01-27 PROCEDURE — 250N000013 HC RX MED GY IP 250 OP 250 PS 637: Performed by: CLINICAL NURSE SPECIALIST

## 2021-01-27 RX ORDER — IBUPROFEN 600 MG/1
600 TABLET, FILM COATED ORAL EVERY 6 HOURS PRN
Status: DISCONTINUED | OUTPATIENT
Start: 2021-01-27 | End: 2021-02-01 | Stop reason: HOSPADM

## 2021-01-27 RX ORDER — GABAPENTIN 400 MG/1
400 CAPSULE ORAL 3 TIMES DAILY
Status: DISCONTINUED | OUTPATIENT
Start: 2021-01-27 | End: 2021-01-29

## 2021-01-27 RX ADMIN — CYCLOBENZAPRINE HYDROCHLORIDE 5 MG: 5 TABLET, FILM COATED ORAL at 07:52

## 2021-01-27 RX ADMIN — HYDROXYZINE HYDROCHLORIDE 100 MG: 50 TABLET, FILM COATED ORAL at 05:09

## 2021-01-27 RX ADMIN — HYDROXYZINE HYDROCHLORIDE 100 MG: 50 TABLET, FILM COATED ORAL at 20:25

## 2021-01-27 RX ADMIN — MULTIPLE VITAMINS W/ MINERALS TAB 1 TABLET: TAB at 07:52

## 2021-01-27 RX ADMIN — ACETAMINOPHEN 650 MG: 325 TABLET, FILM COATED ORAL at 07:59

## 2021-01-27 RX ADMIN — ESCITALOPRAM OXALATE 10 MG: 10 TABLET ORAL at 07:52

## 2021-01-27 RX ADMIN — CYCLOBENZAPRINE HYDROCHLORIDE 5 MG: 5 TABLET, FILM COATED ORAL at 17:55

## 2021-01-27 RX ADMIN — GABAPENTIN 400 MG: 400 CAPSULE ORAL at 13:09

## 2021-01-27 RX ADMIN — SERTRALINE HYDROCHLORIDE 50 MG: 50 TABLET ORAL at 07:52

## 2021-01-27 RX ADMIN — GABAPENTIN 300 MG: 300 CAPSULE ORAL at 07:52

## 2021-01-27 RX ADMIN — GABAPENTIN 400 MG: 400 CAPSULE ORAL at 20:25

## 2021-01-27 RX ADMIN — ACETAMINOPHEN 650 MG: 325 TABLET, FILM COATED ORAL at 13:10

## 2021-01-27 RX ADMIN — MELATONIN TAB 3 MG 3 MG: 3 TAB at 21:12

## 2021-01-27 RX ADMIN — PROPRANOLOL HYDROCHLORIDE 40 MG: 10 TABLET ORAL at 21:12

## 2021-01-27 RX ADMIN — Medication 50 MCG: at 07:52

## 2021-01-27 RX ADMIN — TRAZODONE HYDROCHLORIDE 100 MG: 100 TABLET ORAL at 21:12

## 2021-01-27 RX ADMIN — HYDROXYZINE HYDROCHLORIDE 100 MG: 50 TABLET, FILM COATED ORAL at 15:04

## 2021-01-27 ASSESSMENT — ACTIVITIES OF DAILY LIVING (ADL)
DRESS: INDEPENDENT
LAUNDRY: WITH SUPERVISION
LAUNDRY: WITH SUPERVISION
ORAL_HYGIENE: INDEPENDENT
HYGIENE/GROOMING: INDEPENDENT
DRESS: INDEPENDENT
ORAL_HYGIENE: INDEPENDENT
HYGIENE/GROOMING: INDEPENDENT
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
## 2021-01-27 NOTE — PLAN OF CARE
Attended  OT Creative Expression Groups. Initiated and actively participated in completion of creative task. Social with peers while focusing on task x 45 minutes each session. Futuristic in conversation and plans. Supportive of peers and discussed improved self management strategies to make positive changes.

## 2021-01-27 NOTE — PROGRESS NOTES
Behavioral Health  Note    Behavioral Health  Spirituality Group Note    [facility]    Name: Mariusz Huntley YOB: 1985   MRN: [id number] Age: 35 year old      Patient attended -led group, which included discussion of spirituality, coping with illness and building resilience.    Patient attended group for 1 hrs.    The patient actively participated in group discussion and patient demonstrated an appreciation of topic's application for their personal circumstances. In our discussion on spirituality, Mariusz identified energy as her description of spirituality.       Rosalia Stacy  Chaplain Resident  Pager: [phone]

## 2021-01-27 NOTE — PLAN OF CARE
"Pt states woke up with anxiety. Pt stated melatonin was effective and stated she slept \"good\". Requested vistaril, took medication and returned to room. Pt polite.  "

## 2021-01-27 NOTE — PLAN OF CARE
"Patient is alert and fully oriented. Affect pleasant and full range. Mood less anxious overall as compared with admission, however stated that anxiety level \"has not gone below a 5 today\" (on a scale of 0-10 with 10 being the highest level of sx). Also stated that she woke up with this level of anxiety and is not sure what might be triggering it. Also wondered if the am dose of her propanolol being held today may have contributed. She denies current SI or self harm ideation. Prn hydroxyzine given prior to change of shift reported as minimally helpful for targeting anxiety sx. She spent time with peers, working on an assignment started in [employer] group and was enthusiastic about that. Prn flexeril 5 mg given at 1755 for muscle tightness to mid back along with a hot pack with reported relief. She took prn hydroxyzine 100 mg along with scheduled gabapentin at 2025 with reported decrease in anxiety sx when rechecked. At bedtime, she took prn melatonin 3 mg as she felt she had improved sleep last night after taking it, despite ongoing vivid/disturbing dreams. She continues to use sea bands on her wrists for nausea and has denied any nausea sx this shift.   "

## 2021-01-27 NOTE — PLAN OF CARE
Pt observed in the milieu presents with full range affect calm and pleasant mood. Pt attending and participating in unit groups/activities.  Pt appropriate and social with staff and peers.  Pt denies SI/Self harm thoughts, urges, plan, and intent.pt denies depression this shift and reports anxiety is getting better. Pt reported to have slept better last night after an addition of melatonin. Back muscle spasms getting better as well. Pt denies AVH/HI this shift. Appetite ok. Bp low this shift, propranolol held and pt encouraged to increase fluids. Gabapentin increased. Discharge plan home Friday.      Problem: Mood Impairment (Anxiety Signs/Symptoms)  Goal: Improved Mood Symptoms (Anxiety Signs/Symptoms)  Outcome: Improving     Problem: Sleep Disturbance (Anxiety Signs/Symptoms)  Goal: Improved Sleep (Anxiety Signs/Symptoms)  Outcome: Improving     Problem: Social, Occupational or Functional Impairment (Anxiety Signs/Symptoms)  Goal: Enhanced Social, Occupational or Functional Skills (Anxiety Signs/Symptoms)  Outcome: Improving

## 2021-01-28 PROCEDURE — 250N000013 HC RX MED GY IP 250 OP 250 PS 637: Performed by: NURSE PRACTITIONER

## 2021-01-28 PROCEDURE — G0177 OPPS/PHP; TRAIN & EDUC SERV: HCPCS

## 2021-01-28 PROCEDURE — 124N000002 HC R&B MH UMMC

## 2021-01-28 PROCEDURE — 250N000013 HC RX MED GY IP 250 OP 250 PS 637: Performed by: CLINICAL NURSE SPECIALIST

## 2021-01-28 PROCEDURE — H2032 ACTIVITY THERAPY, PER 15 MIN: HCPCS

## 2021-01-28 PROCEDURE — 250N000013 HC RX MED GY IP 250 OP 250 PS 637: Performed by: PSYCHIATRY & NEUROLOGY

## 2021-01-28 PROCEDURE — 99232 SBSQ HOSP IP/OBS MODERATE 35: CPT | Performed by: NURSE PRACTITIONER

## 2021-01-28 RX ORDER — POLYETHYLENE GLYCOL 3350 17 G/17G
17 POWDER, FOR SOLUTION ORAL DAILY
Status: DISCONTINUED | OUTPATIENT
Start: 2021-01-28 | End: 2021-02-01 | Stop reason: HOSPADM

## 2021-01-28 RX ADMIN — CYCLOBENZAPRINE HYDROCHLORIDE 5 MG: 5 TABLET, FILM COATED ORAL at 10:51

## 2021-01-28 RX ADMIN — MULTIPLE VITAMINS W/ MINERALS TAB 1 TABLET: TAB at 07:45

## 2021-01-28 RX ADMIN — GABAPENTIN 400 MG: 400 CAPSULE ORAL at 21:33

## 2021-01-28 RX ADMIN — Medication 50 MCG: at 07:45

## 2021-01-28 RX ADMIN — TRAZODONE HYDROCHLORIDE 100 MG: 100 TABLET ORAL at 21:35

## 2021-01-28 RX ADMIN — IBUPROFEN 600 MG: 600 TABLET, FILM COATED ORAL at 22:29

## 2021-01-28 RX ADMIN — ESCITALOPRAM OXALATE 10 MG: 10 TABLET ORAL at 07:45

## 2021-01-28 RX ADMIN — CYCLOBENZAPRINE HYDROCHLORIDE 5 MG: 5 TABLET, FILM COATED ORAL at 20:10

## 2021-01-28 RX ADMIN — DOCUSATE SODIUM AND SENNOSIDES 1 TABLET: 8.6; 5 TABLET ORAL at 07:45

## 2021-01-28 RX ADMIN — HYDROXYZINE HYDROCHLORIDE 100 MG: 50 TABLET, FILM COATED ORAL at 05:50

## 2021-01-28 RX ADMIN — PROPRANOLOL HYDROCHLORIDE 40 MG: 10 TABLET ORAL at 07:45

## 2021-01-28 RX ADMIN — GABAPENTIN 400 MG: 400 CAPSULE ORAL at 07:45

## 2021-01-28 RX ADMIN — HYDROXYZINE HYDROCHLORIDE 100 MG: 50 TABLET, FILM COATED ORAL at 20:09

## 2021-01-28 RX ADMIN — IBUPROFEN 600 MG: 600 TABLET, FILM COATED ORAL at 14:14

## 2021-01-28 RX ADMIN — MELATONIN TAB 3 MG 3 MG: 3 TAB at 22:29

## 2021-01-28 RX ADMIN — PROPRANOLOL HYDROCHLORIDE 40 MG: 10 TABLET ORAL at 21:33

## 2021-01-28 RX ADMIN — POLYETHYLENE GLYCOL 3350 17 G: 17 POWDER, FOR SOLUTION ORAL at 08:34

## 2021-01-28 RX ADMIN — HYDROXYZINE HYDROCHLORIDE 100 MG: 50 TABLET, FILM COATED ORAL at 14:12

## 2021-01-28 RX ADMIN — GABAPENTIN 400 MG: 400 CAPSULE ORAL at 14:11

## 2021-01-28 ASSESSMENT — MIFFLIN-ST. JEOR: SCORE: 1481.97

## 2021-01-28 ASSESSMENT — ACTIVITIES OF DAILY LIVING (ADL)
ORAL_HYGIENE: INDEPENDENT
HYGIENE/GROOMING: INDEPENDENT
LAUNDRY: WITH SUPERVISION
DRESS: INDEPENDENT
LAUNDRY: WITH SUPERVISION
ORAL_HYGIENE: INDEPENDENT
HYGIENE/GROOMING: INDEPENDENT
DRESS: INDEPENDENT
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
## 2021-01-28 NOTE — PROGRESS NOTES
"01/27/21 1800     Groups   Details    (Psychotherapy)   Number of patients attending the group:  5  Patients   Group Length:  1 Hours- pt was engaged   Group Therapy Type: Psychotherapy  Hope and Resilience - DBT emotional regulation, coping with anxiety and affirmation     The  Psychotherapy group goal is to promote insight to positive choice and change. Group processing is within a supportive and safe environment. Patients will process emotions using verbal group and expressive psychotherapy interventions including visual art/writing interventions.     Group interventions support patients by: fostering self awareness, creative self expression, communication/social skills and supports, learn positive coping mechanisms and self efficacy/empowerment      Subjective -patient report of mood today- \"super anxious\"    Group Response- 5 pts engaged      Patient Response-Pt was quietly engaged . She was mostly listening and reported very high anxiety. She left and asked for a PRN and then returned. She said she was feeling foggy and having a difficult time thinking and therefore listened but didn't speak much. She said she wasn't sure what was triggering the anxiety but she gets afraid when she feels anxious. The group gave feedback about several helpful ways to cope with anxiety and demonstrated some grounding skills as well for her.      Torsten Mustafa, NATALYFT, ATR-BC          "

## 2021-01-28 NOTE — PLAN OF CARE
"  Problem: Behavioral Health Plan of Care  Goal: Adheres to Safety Considerations for Self and Others  Outcome: Improving     Problem: Behavioral Health Plan of Care  Goal: Optimized Coping Skills in Response to Life Stressors  Outcome: Improving     Problem: Mood Impairment (Anxiety Signs/Symptoms)  Goal: Improved Mood Symptoms (Anxiety Signs/Symptoms)  Outcome: Improving  Flowsheets (Taken 1/27/2021 1730 by Iza Qureshi RN)  Mutually Determined Action Steps (Improved Mood Symptoms):   adheres to medication regimen   names external triggers   names internal triggers     SI/Self harm: pt denies, and denies any history of such  Aggression/agitation/HI: none  AVH:  none  Sleep: 7+ hours documented  PRN Med: Flexeril and hot pack for back muscle pain. Pt believes this pain is getting worse, as pt states the interventions work for a brief time but the pain returns worse than before. PRN senna for constipation.  Medication AE: none  Physical Complaints/Issues: pain in mid-back with radiating to left shoulder area. Constipation.  I & O: eating and drinking well  LBM: small BM this morning after bowel medications administered.  ADLs: independent   Vitals:  stable and WNL. See parameters for scheduled propranolol  COVID 19 Assessment: tested negative on admission and shows no s/s of COVID19 infection  Milieu Participation: active, social, attending groups  Behavior: calm and controlled. Pt is pleasant in interactions with others. Able to get needs met appropriately.     Pt reports her anxiety is improving, rates it at a 4 out of 10 today (10 = worst), stating \"it hasn't gone below five in a long time\". Pt is optimistic and hopeful. Pt believes her scheduled medications are helping her, though she is still bothered by noticeable decrease in ability to focus. Pt states \"I just want to read so bad, but I keep having to re-read every page\". Pt states she typically has trouble focusing when she is anxious, and is hopeful " "that with her anxiety reducing she will be able to focus better soon. Pt believes that the reason he has increased anxiety in the evening is because \"8pm is usually when I would go downstairs and smoke and relax. I haven't smoked in a week\". Pt reports her appetite is improving. Pt started miralax this morning for constipation. Pt reported abdominal distention and pain, which resolved after small bowel movement this morning.   No other concerns at this time. Nursing will continue to monitor and assess.       "

## 2021-01-29 PROCEDURE — G0177 OPPS/PHP; TRAIN & EDUC SERV: HCPCS

## 2021-01-29 PROCEDURE — 250N000013 HC RX MED GY IP 250 OP 250 PS 637: Performed by: NURSE PRACTITIONER

## 2021-01-29 PROCEDURE — 99232 SBSQ HOSP IP/OBS MODERATE 35: CPT | Performed by: NURSE PRACTITIONER

## 2021-01-29 PROCEDURE — 250N000013 HC RX MED GY IP 250 OP 250 PS 637: Performed by: CLINICAL NURSE SPECIALIST

## 2021-01-29 PROCEDURE — 90853 GROUP PSYCHOTHERAPY: CPT

## 2021-01-29 PROCEDURE — 124N000002 HC R&B MH UMMC

## 2021-01-29 PROCEDURE — 250N000013 HC RX MED GY IP 250 OP 250 PS 637: Performed by: PSYCHIATRY & NEUROLOGY

## 2021-01-29 RX ORDER — GABAPENTIN 400 MG/1
400 CAPSULE ORAL 3 TIMES DAILY
Status: COMPLETED | OUTPATIENT
Start: 2021-01-29 | End: 2021-01-29

## 2021-01-29 RX ORDER — GABAPENTIN 600 MG/1
600 TABLET ORAL 3 TIMES DAILY
Status: DISCONTINUED | OUTPATIENT
Start: 2021-01-30 | End: 2021-02-01

## 2021-01-29 RX ORDER — ANALGESIC BALM 1.74; 4.06 G/29G; G/29G
OINTMENT TOPICAL EVERY 4 HOURS PRN
Status: DISCONTINUED | OUTPATIENT
Start: 2021-01-29 | End: 2021-02-01 | Stop reason: HOSPADM

## 2021-01-29 RX ADMIN — POLYETHYLENE GLYCOL 3350 17 G: 17 POWDER, FOR SOLUTION ORAL at 08:15

## 2021-01-29 RX ADMIN — CYCLOBENZAPRINE HYDROCHLORIDE 5 MG: 5 TABLET, FILM COATED ORAL at 17:10

## 2021-01-29 RX ADMIN — GABAPENTIN 400 MG: 400 CAPSULE ORAL at 22:04

## 2021-01-29 RX ADMIN — GABAPENTIN 400 MG: 400 CAPSULE ORAL at 08:15

## 2021-01-29 RX ADMIN — IBUPROFEN 600 MG: 600 TABLET, FILM COATED ORAL at 12:43

## 2021-01-29 RX ADMIN — PROPRANOLOL HYDROCHLORIDE 40 MG: 10 TABLET ORAL at 08:15

## 2021-01-29 RX ADMIN — TRAZODONE HYDROCHLORIDE 100 MG: 100 TABLET ORAL at 22:04

## 2021-01-29 RX ADMIN — CYCLOBENZAPRINE HYDROCHLORIDE 5 MG: 5 TABLET, FILM COATED ORAL at 08:15

## 2021-01-29 RX ADMIN — Medication 50 MCG: at 08:15

## 2021-01-29 RX ADMIN — HYDROXYZINE HYDROCHLORIDE 100 MG: 50 TABLET, FILM COATED ORAL at 19:11

## 2021-01-29 RX ADMIN — HYDROXYZINE HYDROCHLORIDE 100 MG: 50 TABLET, FILM COATED ORAL at 08:15

## 2021-01-29 RX ADMIN — PROPRANOLOL HYDROCHLORIDE 40 MG: 10 TABLET ORAL at 22:04

## 2021-01-29 RX ADMIN — GABAPENTIN 400 MG: 400 CAPSULE ORAL at 14:26

## 2021-01-29 RX ADMIN — ESCITALOPRAM OXALATE 10 MG: 10 TABLET ORAL at 08:15

## 2021-01-29 RX ADMIN — MULTIPLE VITAMINS W/ MINERALS TAB 1 TABLET: TAB at 08:16

## 2021-01-29 RX ADMIN — MELATONIN TAB 3 MG 3 MG: 3 TAB at 22:04

## 2021-01-29 ASSESSMENT — ACTIVITIES OF DAILY LIVING (ADL)
DRESS: INDEPENDENT
HYGIENE/GROOMING: INDEPENDENT
ORAL_HYGIENE: INDEPENDENT
LAUNDRY: WITH SUPERVISION
HYGIENE/GROOMING: INDEPENDENT
LAUNDRY: WITH SUPERVISION
DRESS: INDEPENDENT
ORAL_HYGIENE: INDEPENDENT

## 2021-01-29 NOTE — PROGRESS NOTES
Patient's mood is calm with a bright affect. She is visible in the milieu interacting with peers. She played picture puzzle with staff and peers. Patient felt anxious. She rated her anxiety as 7 out of 10. Hydroxyzine 100 mgs given per patient's request. She is pleasant and cooperative. She is med compliant. She complained of pain on her shoulder blades. Flexeril 5 mgs given (Pls see MAR). Patient attended the group activity this evening.    Patient complained of persistent pain on her shoulder. Ibuprofen 600 mgs given as prn for moderate pain. Melatonin 3 mgs also given per patient's request.
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
## 2021-01-29 NOTE — PLAN OF CARE
Attended 2 OT Creative Expression Groups.Eager to begin familiar task and was able to gather supplies and execute task independently. Social while working on task. Noted how music and creative tasks soothes and calms when she is stressed. Social with peers. Futuristic in conversation and plans. Spoke positively and lovingly about her children and . Noted how supportive her  is currently and prior to admission. Attended afternoon OT group focused on self esteem with 2 peers x 45 minutes. Was able to identify ways to increase her self esteem and verbalized plans to follow through with past discharge. Thankful for group and support received.

## 2021-01-29 NOTE — PLAN OF CARE
"Patient said she slept from about 11-5:30 overnight, with no vivid dreams. She said she is not discharging today. \"I'm not where I need to be. I'm not ready yet.\" Patient endorsed anxiety and requested Hydroxyzine prn. 100 mg was given as ordered. Patient also c/o tightness in shoulders and took Flexeril prn. Patient denied having suicidal thoughts. Will continue to monitor.    Patient said, about 1 hour after taking Hydroxyzine, that her anxiety was 4/10 from 5/10. \"It's slowly coming down.\"  "

## 2021-01-29 NOTE — PROGRESS NOTES
CLINICAL NUTRITION SERVICES - REASSESSMENT NOTE     Nutrition Prescription    RECOMMENDATIONS FOR MDs/PROVIDERS TO ORDER:  None today    Malnutrition Status:    Unable to fully assess as no NFPA completed, but pt does likely at least qualify for non-severe malnutrition in the context of acute on chronic mental illness     Recommendations already ordered by Registered Dietitian (RD):  Discontinue boost     Future/Additional Recommendations:  Monitor meal intakes  Monitor weight and lab trends      EVALUATION OF THE PROGRESS TOWARD GOALS   Diet: Regular  Supplement: boost q meal  Intake: 75% of meals per pt report        NEW FINDINGS   Pt states her appetite is a lot better. Stated she has had no additional vomiting since admission. Nausea has improved with sea bands, only c/o nausea when they are removed. Pt currently has and order for boost q meal however she is request they be discontinued now that her appetite and intakes have picked up. She is participating in community snack and enjoys he yogurt and granola. Pt with no additional requests or concerns at this time.   Pt reports a more UBW of around 200 lbs in June 2020f weight trends reported above accurate, pt has had a significant 17.5% weight loss in >6 months.  Wt stable since admit  Wt Readings from Last 10 Encounters:   01/28/21 74.9 kg (165 lb 3.2 oz)   01/24/21 75.6 kg (166 lb 11.2 oz)   01/22/20 75.1 kg (165 lb 9.6 oz)     Started on vitamin D supplement  Results for STEVE GR (MRN [id number]) as of 1/29/2021 14:41   Ref. Range 1/24/2021 07:15   Vitamin D Deficiency screening Latest Ref Range: 20 - 75 ug/L 17 (L)     MALNUTRITION  % Intake: < 75% for >/= 3 months (non-severe)  % Weight Loss: > 10% in 6 months (severe)  Subcutaneous Fat Loss: Unable to assess  Muscle Loss: Unable to assess  Fluid Accumulation/Edema: None noted  Malnutrition Diagnosis: Unable to fully assess as no NFPA completed, but pt does likely at least qualify for non-severe  malnutrition in the context of acute on chronic mental illness     Previous Goals   Patient to consume % of nutritionally adequate meal trays TID, or the equivalent with supplements/snacks.  Evaluation: Met    Previous Nutrition Diagnosis  Inadequate protein-energy intake related to mental illness as evidenced by pt report with a significant 17.5% weight loss in >6 months  Evaluation: Improving    CURRENT NUTRITION DIAGNOSIS  Predicted inadequate protein-energy intake related to mental illness as evidenced by pt report with a significant 17.5% weight loss in >6 months with improved intakes since admit    INTERVENTIONS  Implementation  Discontinue boost     Goals  Patient to consume % of nutritionally adequate meal trays TID, or the equivalent with supplements/snacks.    Monitoring/Evaluation  Progress toward goals will be monitored and evaluated per protocol.    Marylou Belcher MS, RD, LDN  Unit Pager [phone]

## 2021-01-30 PROCEDURE — 250N000013 HC RX MED GY IP 250 OP 250 PS 637: Performed by: PSYCHIATRY & NEUROLOGY

## 2021-01-30 PROCEDURE — H2032 ACTIVITY THERAPY, PER 15 MIN: HCPCS

## 2021-01-30 PROCEDURE — 250N000013 HC RX MED GY IP 250 OP 250 PS 637: Performed by: NURSE PRACTITIONER

## 2021-01-30 PROCEDURE — 124N000002 HC R&B MH UMMC

## 2021-01-30 PROCEDURE — 250N000013 HC RX MED GY IP 250 OP 250 PS 637: Performed by: CLINICAL NURSE SPECIALIST

## 2021-01-30 RX ADMIN — POLYETHYLENE GLYCOL 3350 17 G: 17 POWDER, FOR SOLUTION ORAL at 08:39

## 2021-01-30 RX ADMIN — MULTIPLE VITAMINS W/ MINERALS TAB 1 TABLET: TAB at 08:40

## 2021-01-30 RX ADMIN — TRAZODONE HYDROCHLORIDE 100 MG: 100 TABLET ORAL at 21:53

## 2021-01-30 RX ADMIN — ESCITALOPRAM OXALATE 10 MG: 10 TABLET ORAL at 08:39

## 2021-01-30 RX ADMIN — HYDROXYZINE HYDROCHLORIDE 100 MG: 50 TABLET, FILM COATED ORAL at 06:56

## 2021-01-30 RX ADMIN — CYCLOBENZAPRINE HYDROCHLORIDE 5 MG: 5 TABLET, FILM COATED ORAL at 08:40

## 2021-01-30 RX ADMIN — MELATONIN TAB 3 MG 3 MG: 3 TAB at 21:57

## 2021-01-30 RX ADMIN — PROPRANOLOL HYDROCHLORIDE 40 MG: 10 TABLET ORAL at 08:39

## 2021-01-30 RX ADMIN — Medication 50 MCG: at 08:39

## 2021-01-30 RX ADMIN — IBUPROFEN 600 MG: 600 TABLET, FILM COATED ORAL at 11:20

## 2021-01-30 RX ADMIN — GABAPENTIN 600 MG: 600 TABLET, FILM COATED ORAL at 14:56

## 2021-01-30 RX ADMIN — PROPRANOLOL HYDROCHLORIDE 40 MG: 10 TABLET ORAL at 21:52

## 2021-01-30 RX ADMIN — GABAPENTIN 600 MG: 600 TABLET, FILM COATED ORAL at 08:39

## 2021-01-30 RX ADMIN — GABAPENTIN 600 MG: 600 TABLET, FILM COATED ORAL at 21:52

## 2021-01-30 RX ADMIN — CYCLOBENZAPRINE HYDROCHLORIDE 5 MG: 5 TABLET, FILM COATED ORAL at 17:45

## 2021-01-30 ASSESSMENT — ACTIVITIES OF DAILY LIVING (ADL)
LAUNDRY: WITH SUPERVISION
ORAL_HYGIENE: INDEPENDENT
HYGIENE/GROOMING: INDEPENDENT
LAUNDRY: WITH SUPERVISION
DRESS: INDEPENDENT
ORAL_HYGIENE: INDEPENDENT
DRESS: INDEPENDENT

## 2021-01-30 NOTE — PROGRESS NOTES
"01/29/21 1800     Groups   Details    (Psychotherapy)   Number of patients attending the group: 5  Patients   Group Length:  1 Hours- pt was engaged for the full group hour  Group Therapy Type: Psychotherapy  Hope and Resilience - Process group-positive psychology- topic of KATIE-      The  Psychotherapy group goal is to promote insight to positive choice and change. Group processing is within a supportive and safe environment. Patients will process emotions using verbal group and expressive psychotherapy interventions including visual art/writing interventions.     Group interventions support patients by: fostering self awareness, creative self expression, communication/social skills and supports, learn positive coping mechanisms and self efficacy/empowerment      Subjective -patient report of mood today- \"anxious\"      Group Response- 5 pts  engaged      Patient Response- Pt was pleasant, cooperative and engaged. She enjoyed doodling to calm her anxiety. She said anxiety was a 3 of 10 and was at a 7 of 10 so was getting better and not so acute. She told a peer she missed interacting with her which made the peer feel good. She said she was happy her  heard good news on his court case. This katie topic, she said she would apply it and use when she feels anxious,she will think about the katie she feels when in nature etc.         Torsten Mustafa, LMFT, ATR-BC               "

## 2021-01-30 NOTE — PLAN OF CARE
"Patient was observed in the milieu today. She said she woke up very anxious at about 5:30 today. She took Hydroxyzine 100 mg prn at that time. By the time she took scheduled meds about 0845, she rated anxiety 4/10. Patient went up to 600mg of Gabapentin today. She is currently napping in her room. She denied suicidal thoughts. Denied AH/VH. Will continue to monitor and offer support. This shift patient took Flexeril prn for back pain, and Ibuprofen prn for headache, see MAR.   /71   Pulse 54   Temp 97.5  F (36.4  C) (Oral)   Resp 17   Ht 1.702 m (5' 7\")   Wt 74.9 kg (165 lb 3.2 oz)   SpO2 98%   BMI 25.87 kg/m        Problem: Mood Impairment (Anxiety Signs/Symptoms)  Goal: Improved Mood Symptoms (Anxiety Signs/Symptoms)  Outcome: No Change     "

## 2021-01-30 NOTE — PROGRESS NOTES
Pt slept approximately 7 hours during the night, on status 15 minute safety checks.Pt requested and given hydrOXYzine 100 mg for anxiety (see MAR) this morning.

## 2021-01-30 NOTE — PLAN OF CARE
"  Problem: Activity and Energy Impairment (Anxiety Signs/Symptoms)  Goal: Optimized Energy Level (Anxiety Signs/Symptoms)  Outcome: Improving  Note: Patient said she is expecting to be discharged on Monday. Said her anxiety is getting more in control, that Hydroxyzine helps. Patient verbalized understanding of latest changes in Gabapentin dose. She said her anxiety gets really bad at about 8 pm. Stated: \"like a clock\". Pt denied depression, suicidal thoughts, wishing to be dead, having thoughts of hurting self or others. Denied hallucinations or paranoia.   Thought were linear and logical. Speech was clear and organized. No loose associations, no racing thoughts. Mood was bright, affect was appropriate to situation. Memory was intact. Patient appeared well groomed and clean. Appetite is good, she ate 100% of her supper meal. Said she has increased her fluid intake in the last few days. Patient reported that Melatonin is effective for her nigh sleep and she would like to have a PRN dose tonight.   Pain, mid left back, reported improving with PRN Flexeril and Ibuprofen. PRN Flexeril 5 mg was given at 17:10 per pt's request.   Patient's main concern was constipation. She agreed to have a cup of prune juice. No other physical issues.   No behavioral issues. Patient spending the evening out on the unit, coloring, reading, watching TV and socializing with other patients.  Will follow with HS medications.    Intervention: Optimize Energy Level  Recent Flowsheet Documentation  Taken 1/29/2021 1820 by Sulema Clark, RN  Diversional Activity:   art work   reading   television  Activity (Behavioral Health): up ad moustapha     "

## 2021-01-31 PROCEDURE — 124N000002 HC R&B MH UMMC

## 2021-01-31 PROCEDURE — 250N000013 HC RX MED GY IP 250 OP 250 PS 637: Performed by: NURSE PRACTITIONER

## 2021-01-31 PROCEDURE — 250N000013 HC RX MED GY IP 250 OP 250 PS 637: Performed by: PSYCHIATRY & NEUROLOGY

## 2021-01-31 PROCEDURE — 250N000013 HC RX MED GY IP 250 OP 250 PS 637: Performed by: CLINICAL NURSE SPECIALIST

## 2021-01-31 RX ADMIN — PROPRANOLOL HYDROCHLORIDE 40 MG: 10 TABLET ORAL at 08:14

## 2021-01-31 RX ADMIN — POLYETHYLENE GLYCOL 3350 17 G: 17 POWDER, FOR SOLUTION ORAL at 08:15

## 2021-01-31 RX ADMIN — CYCLOBENZAPRINE HYDROCHLORIDE 5 MG: 5 TABLET, FILM COATED ORAL at 19:27

## 2021-01-31 RX ADMIN — CYCLOBENZAPRINE HYDROCHLORIDE 5 MG: 5 TABLET, FILM COATED ORAL at 08:16

## 2021-01-31 RX ADMIN — GABAPENTIN 600 MG: 600 TABLET, FILM COATED ORAL at 21:07

## 2021-01-31 RX ADMIN — ESCITALOPRAM OXALATE 10 MG: 10 TABLET ORAL at 08:14

## 2021-01-31 RX ADMIN — DOCUSATE SODIUM AND SENNOSIDES 1 TABLET: 8.6; 5 TABLET ORAL at 08:29

## 2021-01-31 RX ADMIN — MELATONIN TAB 3 MG 3 MG: 3 TAB at 21:07

## 2021-01-31 RX ADMIN — TRAZODONE HYDROCHLORIDE 100 MG: 100 TABLET ORAL at 21:07

## 2021-01-31 RX ADMIN — GABAPENTIN 600 MG: 600 TABLET, FILM COATED ORAL at 08:14

## 2021-01-31 RX ADMIN — GABAPENTIN 600 MG: 600 TABLET, FILM COATED ORAL at 14:48

## 2021-01-31 RX ADMIN — Medication 50 MCG: at 08:14

## 2021-01-31 RX ADMIN — PROPRANOLOL HYDROCHLORIDE 40 MG: 10 TABLET ORAL at 21:07

## 2021-01-31 RX ADMIN — MULTIPLE VITAMINS W/ MINERALS TAB 1 TABLET: TAB at 08:15

## 2021-01-31 ASSESSMENT — ACTIVITIES OF DAILY LIVING (ADL)
DRESS: SCRUBS (BEHAVIORAL HEALTH)
ORAL_HYGIENE: INDEPENDENT
DRESS: SCRUBS (BEHAVIORAL HEALTH);INDEPENDENT
LAUNDRY: WITH SUPERVISION
HYGIENE/GROOMING: INDEPENDENT
ORAL_HYGIENE: INDEPENDENT
HYGIENE/GROOMING: INDEPENDENT
LAUNDRY: WITH SUPERVISION

## 2021-01-31 ASSESSMENT — MIFFLIN-ST. JEOR: SCORE: 1490.59
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
## 2021-02-01 VITALS
BODY MASS INDEX: 26.23 KG/M2 | HEART RATE: 60 BPM | RESPIRATION RATE: 17 BRPM | WEIGHT: 167.1 LBS | HEIGHT: 67 IN | TEMPERATURE: 97.8 F | OXYGEN SATURATION: 99 % | DIASTOLIC BLOOD PRESSURE: 79 MMHG | SYSTOLIC BLOOD PRESSURE: 122 MMHG

## 2021-02-01 PROCEDURE — 250N000013 HC RX MED GY IP 250 OP 250 PS 637: Performed by: NURSE PRACTITIONER

## 2021-02-01 PROCEDURE — G0177 OPPS/PHP; TRAIN & EDUC SERV: HCPCS

## 2021-02-01 PROCEDURE — 250N000013 HC RX MED GY IP 250 OP 250 PS 637: Performed by: PSYCHIATRY & NEUROLOGY

## 2021-02-01 PROCEDURE — 99239 HOSP IP/OBS DSCHRG MGMT >30: CPT | Performed by: NURSE PRACTITIONER

## 2021-02-01 PROCEDURE — 250N000013 HC RX MED GY IP 250 OP 250 PS 637: Performed by: CLINICAL NURSE SPECIALIST

## 2021-02-01 RX ORDER — POLYETHYLENE GLYCOL 3350 17 G/17G
17 POWDER, FOR SOLUTION ORAL DAILY
Start: 2021-02-01 | End: 2022-01-10

## 2021-02-01 RX ORDER — CYCLOBENZAPRINE HCL 5 MG
5 TABLET ORAL 3 TIMES DAILY PRN
Qty: 90 TABLET | Refills: 1 | Status: ON HOLD | OUTPATIENT
Start: 2021-02-01 | End: 2021-05-04

## 2021-02-01 RX ORDER — AMOXICILLIN 250 MG
1 CAPSULE ORAL 2 TIMES DAILY PRN
Start: 2021-02-01 | End: 2022-01-10

## 2021-02-01 RX ORDER — ESCITALOPRAM OXALATE 10 MG/1
10 TABLET ORAL DAILY
Qty: 30 TABLET | Refills: 1 | Status: ON HOLD | OUTPATIENT
Start: 2021-02-02 | End: 2021-05-04

## 2021-02-01 RX ORDER — VITAMIN B COMPLEX
50 TABLET ORAL DAILY
Qty: 60 TABLET | Refills: 1 | Status: SHIPPED | OUTPATIENT
Start: 2021-02-02 | End: 2022-01-10

## 2021-02-01 RX ORDER — LANOLIN ALCOHOL/MO/W.PET/CERES
3-6 CREAM (GRAM) TOPICAL
Qty: 60 TABLET | Refills: 1 | Status: SHIPPED | OUTPATIENT
Start: 2021-02-01

## 2021-02-01 RX ORDER — HYDROXYZINE HYDROCHLORIDE 50 MG/1
100 TABLET, FILM COATED ORAL EVERY 4 HOURS PRN
Qty: 180 TABLET | Refills: 1 | Status: ON HOLD | OUTPATIENT
Start: 2021-02-01 | End: 2021-05-04

## 2021-02-01 RX ORDER — ACETAMINOPHEN 325 MG/1
650 TABLET ORAL EVERY 4 HOURS PRN
Start: 2021-02-01 | End: 2022-01-10

## 2021-02-01 RX ORDER — GABAPENTIN 600 MG/1
600 TABLET ORAL 4 TIMES DAILY
Qty: 120 TABLET | Refills: 1 | Status: ON HOLD | OUTPATIENT
Start: 2021-02-01 | End: 2021-04-26

## 2021-02-01 RX ORDER — ANALGESIC BALM 1.74; 4.06 G/29G; G/29G
1 OINTMENT TOPICAL EVERY 4 HOURS PRN
Status: ON HOLD
Start: 2021-02-01 | End: 2021-04-27

## 2021-02-01 RX ORDER — PROPRANOLOL HYDROCHLORIDE 40 MG/1
40 TABLET ORAL 2 TIMES DAILY
Qty: 60 TABLET | Refills: 1 | Status: ON HOLD | OUTPATIENT
Start: 2021-02-01 | End: 2021-05-04

## 2021-02-01 RX ORDER — IBUPROFEN 600 MG/1
600 TABLET, FILM COATED ORAL EVERY 6 HOURS PRN
Start: 2021-02-01

## 2021-02-01 RX ORDER — GABAPENTIN 600 MG/1
600 TABLET ORAL 4 TIMES DAILY
Status: DISCONTINUED | OUTPATIENT
Start: 2021-02-01 | End: 2021-02-01 | Stop reason: HOSPADM

## 2021-02-01 RX ADMIN — IBUPROFEN 600 MG: 600 TABLET, FILM COATED ORAL at 12:04

## 2021-02-01 RX ADMIN — ESCITALOPRAM OXALATE 10 MG: 10 TABLET ORAL at 07:58

## 2021-02-01 RX ADMIN — MULTIPLE VITAMINS W/ MINERALS TAB 1 TABLET: TAB at 07:58

## 2021-02-01 RX ADMIN — PROPRANOLOL HYDROCHLORIDE 40 MG: 10 TABLET ORAL at 07:58

## 2021-02-01 RX ADMIN — Medication 50 MCG: at 07:58

## 2021-02-01 RX ADMIN — HYDROXYZINE HYDROCHLORIDE 100 MG: 50 TABLET, FILM COATED ORAL at 04:55

## 2021-02-01 RX ADMIN — GABAPENTIN 600 MG: 600 TABLET, FILM COATED ORAL at 07:58

## 2021-02-01 RX ADMIN — POLYETHYLENE GLYCOL 3350 17 G: 17 POWDER, FOR SOLUTION ORAL at 07:58

## 2021-02-01 RX ADMIN — CYCLOBENZAPRINE HYDROCHLORIDE 5 MG: 5 TABLET, FILM COATED ORAL at 07:58

## 2021-02-01 RX ADMIN — GABAPENTIN 600 MG: 600 TABLET, FILM COATED ORAL at 12:04

## 2021-02-01 RX ADMIN — DOCUSATE SODIUM AND SENNOSIDES 1 TABLET: 8.6; 5 TABLET ORAL at 08:22

## 2021-02-01 ASSESSMENT — ACTIVITIES OF DAILY LIVING (ADL)
DRESS: SCRUBS (BEHAVIORAL HEALTH)
ORAL_HYGIENE: INDEPENDENT
LAUNDRY: WITH SUPERVISION
HYGIENE/GROOMING: INDEPENDENT

## 2021-02-01 NOTE — PLAN OF CARE
CTC Note          Work Completed: reviewed chart, remotely attended team discussion.    Entered team note.  Scheduled follow up psychiatry intake:    [facility] [place]  Intake Psychiatry Appointment scheduled for Thursday 2/11/21 2:20 with Dr. Morrison.  He will call you.    Please have the name and phone number of your pharmacy ready.  [address],  [address]  [place] [address]  Phone: [phone]    Fax: [phone]     Discharge plan or goal: pt will discharge today                Barriers to discharge: none

## 2021-02-01 NOTE — PLAN OF CARE
Attended 3 OT groups (2 Creative Expression Groups and 1 Self Awareness/Social Skills Group). Initiated all group and was present x 45 minutes each with 3 peers present. Noted she was experiencing anxiety due to planned discharge. Later expressed excitement related to seeing her children, upon her return home. Was focused, organized and attentive to details of familiar task.  Social with peers and spontaneous in all interactions. Futuristic in goals and plans.
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
## 2021-02-01 NOTE — PLAN OF CARE
Problem: Behavioral Health Plan of Care  Goal: Develops/Participates in Therapeutic [place] to Support Successful Transition  Intervention: Foster Therapeutic [place]  Recent Flowsheet Documentation  Taken 1/31/2021 2000 by Jaret Salas RN  Trust Relationship/Rapport:   care explained   choices provided   emotional support provided   empathic listening provided   questions answered   questions encouraged   reassurance provided   thoughts/feelings acknowledged  Patient spent most of the evening out on the milieu watching television and interacting with peers. She presented with a full range affect and was calm and pleasant. Patient endorsed anxiety rated as 7/10 but told writer that she didn't need any PRN medication. Denied SI/SIB, depression, hallucinations, or racing thoughts. She hopes to be discharged tomorrow. She ate all meals and snacks and was compliant with medications. Requested and was administered Flexeril and Melatonin PRN and both were effective. No maladaptive behaviors of safety concerns reported or observed.

## 2021-02-01 NOTE — PROGRESS NOTES
Pt awakened and requested and given HydrOXYzine for anxiety at 04:55, slept approximately 6.5 hours during the night, on status 15 minute safety checks.
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
## 2021-02-01 NOTE — DISCHARGE INSTRUCTIONS
Behavioral Discharge Planning and Instructions      Summary:  You were admitted on 1/22/2021  due to Depression and Anxiety.  You were treated by Lakia Buenrostro NP and discharged on 2/1/2021 from [facility] to Home      Principal Diagnosis:   Major depressive disorder, recurrent, severe, without psychosis  General anxiety disorder with panic attacks  Rule out posttraumatic stress disorder  Cannabis use disorder  Vitamin D insufficiency  Nausea, resolving  Migraine headaches  Constipation  Back pain      Health Care Follow-up Appointments:     [facility] [place]  Intake Psychiatry Appointment scheduled for Thursday 2/11/21 2:20 with Dr. Morrison.  He will call you.    Please have the name and phone number of your pharmacy ready.  [address],  [address]  [place] [address]  Phone: [phone]    Fax: [phone]  ([facility], please fax discharge summary)      You report that you will contact your therapist to schedule a follow up appointment:  Courtney kamara [facility] [place]  Address: [address] [address], [place] [address]  Phone: [phone]    Attend all scheduled appointments with your outpatient providers. Call at least 24 hours in advance if you need to reschedule an appointment to ensure continued access to your outpatient providers.   Major Treatments, Procedures and Findings:  You were provided with: a psychiatric assessment, medication evaluation and/or management and milieu management    Symptoms to Report: losing more sleep, mood getting worse or thoughts of suicide    Early warning signs can include: increased depression or anxiety sleep disturbances increased thoughts or behaviors of suicide or self-harm  increased unusual thinking, such as paranoia or hearing voices    Safety and Wellness:  Take all medicines as directed.  Make no changes unless your doctor suggests them.        Follow treatment recommendations.  Refrain from alcohol and  "non-prescribed drugs.  If there is a concern for safety, call 911.    Resources:   Crisis Intervention: 967.584.5969 or 911-109-8039 (TTY: 708.626.4970).  Call anytime for help.  National [place] on Mental Illness (www.[place].hortencia.org): 857.733.7724 or 709-901-3516.  Suicide Awareness Voices of Education (SAVE) (www.save.org): 964-307-SAVE (5046)  [facility] Crisis (COPE) Response - Adult 999 773-2233  Crisis text line: Text \"[place]\" to 506600. Free, confidential, 24/7.  Crisis Intervention: 911.802.2521 or 556-571-5636. Call anytime for help.       The treatment team has appreciated the opportunity to work with you.     If you have any questions or concerns our unit number is [phone]        "

## 2021-02-01 NOTE — PROGRESS NOTES
Patient was discharged today to home with . Patient denied suicidal thoughts at time of discharge. Patient was given discharge instructions. She verbalized understanding of the. A 30 day supply of medications was sent home with patient. All of patient's personal belongings were accounted for and returned to patient at discharge.

## 2021-02-03 ENCOUNTER — TELEPHONE (OUTPATIENT)
Dept: PHARMACY | Facility: CLINIC | Age: 36
End: 2021-02-03

## 2021-02-03 NOTE — TELEPHONE ENCOUNTER
MTM referral from: Transitions of Care (recent hospital discharge or ED visit)    MTM referral outreach attempt #2 on February 3, 2021 at 12:54 PM      Outcome: Patient not reachable after several attempts, will route to MTM Pharmacist/Provider as an FYI. Thank you for the referral.    Johnny Galvin, MTM coordinator

## 2021-02-28 ENCOUNTER — HOSPITAL ENCOUNTER (EMERGENCY)
Facility: CLINIC | Age: 36
Discharge: HOME | End: 2021-02-28
Attending: EMERGENCY MEDICINE
Payer: COMMERCIAL

## 2021-02-28 ENCOUNTER — HOSPITAL ENCOUNTER (EMERGENCY)
Facility: CLINIC | Age: 36
End: 2021-02-28

## 2021-02-28 ENCOUNTER — HOSPITAL ENCOUNTER (EMERGENCY)
Facility: CLINIC | Age: 36
Discharge: HOME | End: 2021-02-28
Attending: EMERGENCY MEDICINE | Admitting: EMERGENCY MEDICINE
Payer: COMMERCIAL

## 2021-02-28 VITALS
HEART RATE: 89 BPM | OXYGEN SATURATION: 99 % | RESPIRATION RATE: 14 BRPM | SYSTOLIC BLOOD PRESSURE: 119 MMHG | DIASTOLIC BLOOD PRESSURE: 68 MMHG | TEMPERATURE: 99.9 F

## 2021-02-28 VITALS
RESPIRATION RATE: 18 BRPM | TEMPERATURE: 99.9 F | SYSTOLIC BLOOD PRESSURE: 131 MMHG | HEART RATE: 83 BPM | OXYGEN SATURATION: 97 % | DIASTOLIC BLOOD PRESSURE: 84 MMHG

## 2021-02-28 DIAGNOSIS — F41.9 ANXIETY: ICD-10-CM

## 2021-02-28 DIAGNOSIS — F32.A DEPRESSION, UNSPECIFIED DEPRESSION TYPE: ICD-10-CM

## 2021-02-28 PROCEDURE — 99283 EMERGENCY DEPT VISIT LOW MDM: CPT | Performed by: EMERGENCY MEDICINE

## 2021-02-28 PROCEDURE — 99281 EMR DPT VST MAYX REQ PHY/QHP: CPT

## 2021-02-28 PROCEDURE — 90791 PSYCH DIAGNOSTIC EVALUATION: CPT

## 2021-02-28 PROCEDURE — 99285 EMERGENCY DEPT VISIT HI MDM: CPT | Mod: 25 | Performed by: EMERGENCY MEDICINE

## 2021-02-28 RX ORDER — TRAZODONE HYDROCHLORIDE 150 MG/1
150 TABLET ORAL AT BEDTIME
Status: ON HOLD | COMMUNITY
End: 2021-05-04

## 2021-02-28 RX ORDER — LORAZEPAM 2 MG/1
2 TABLET ORAL EVERY 6 HOURS PRN
Status: ON HOLD | COMMUNITY
End: 2021-04-26

## 2021-02-28 RX ORDER — GABAPENTIN 800 MG/1
800 TABLET ORAL 3 TIMES DAILY
Status: ON HOLD | COMMUNITY
End: 2021-05-04

## 2021-02-28 ASSESSMENT — ENCOUNTER SYMPTOMS
HALLUCINATIONS: 0
DYSPHORIC MOOD: 0
NERVOUS/ANXIOUS: 1
SLEEP DISTURBANCE: 0
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
## 2021-02-28 NOTE — ED PROVIDER NOTES
ED Provider Note  [facility]      History     Chief Complaint   Patient presents with     Anxiety     Having panic attack     HPI  Merit Nikky Huntley is a 25 year old female who presents to the ED due to having an anxiety episode today.  She says she is at her home and it is stressful there.  She says her sister in law is there and causes anxiety.  She says she was in the hospital in the past and thinks she needs to be in the hospital again because her meds aren't working.  She denies si/hi.  She is sleeping normally.      Past Medical History  Past Medical History:   Diagnosis Date     Anxiety      Depression      Intractable chronic migraine without aura      Panic attack      Past Surgical History:   Procedure Laterality Date      SECTION      x 2     acetaminophen (TYLENOL) 325 MG tablet  albuterol (PROAIR HFA/PROVENTIL HFA/VENTOLIN HFA) 108 (90 Base) MCG/ACT inhaler  cyclobenzaprine (FLEXERIL) 5 MG tablet  escitalopram (LEXAPRO) 10 MG tablet  gabapentin (NEURONTIN) 600 MG tablet  gabapentin (NEURONTIN) 600 MG tablet  hydrOXYzine (ATARAX) 50 MG tablet  ibuprofen (ADVIL/MOTRIN) 600 MG tablet  LORazepam (ATIVAN) 2 MG tablet  melatonin 3 MG tablet  methyl salicylate-menthol (TONY-OCAMPO) OINT ointment  Multiple Vitamins-Minerals (MULTIVITAMIN GUMMIES ADULT PO)  ondansetron (ZOFRAN-ODT) 4 MG ODT tab  polyethylene glycol (MIRALAX) 17 GM/Dose powder  propranolol (INDERAL) 40 MG tablet  senna-docusate (SENOKOT-S/PERICOLACE) 8.6-50 MG tablet  traZODone (DESYREL) 100 MG tablet  traZODone (DESYREL) 100 MG tablet  Vitamin D3 (CHOLECALCIFEROL) 25 mcg (1000 units) tablet      Allergies   Allergen Reactions     Honeybees [Bees] Anaphylaxis     Sulfamethoxazole-Trimethoprim Nausea and Vomiting     Codeine      Sulfa Drugs      Amoxicillin Rash     Codeine Rash     Family History  Family History   Problem Relation Age of Onset     Hypertension Maternal Grandmother      Lung Cancer Maternal  Grandmother      Hypertension Maternal Grandfather      Lung Cancer Maternal Grandfather      Myocardial Infarction Paternal Grandmother      Diabetes Paternal Grandmother      Hypertension Paternal Grandmother      Lung Cancer Paternal Grandmother      Social History   Social History     Tobacco Use     Smoking status: Never Smoker     Smokeless tobacco: Never Used   Substance Use Topics     Alcohol use: None     Drug use: None      Past medical history, past surgical history, medications, allergies, family history, and social history were reviewed with the patient. No additional pertinent items.       Review of Systems   Psychiatric/Behavioral: Negative for dysphoric mood, hallucinations, self-injury, sleep disturbance and suicidal ideas. The patient is nervous/anxious.    All other systems reviewed and are negative.    A complete review of systems was performed with pertinent positives and negatives noted in the HPI, and all other systems negative.    Physical Exam   BP: 131/84  Pulse: 83  Temp: 99.9  F (37.7  C)  Resp: 18  SpO2: 97 %  Physical Exam  Vitals signs and nursing note reviewed.   HENT:      Head: Normocephalic and atraumatic.      Nose:      Comments: Mask on  Eyes:      Extraocular Movements: Extraocular movements intact.   Neck:      Musculoskeletal: Normal range of motion.   Cardiovascular:      Rate and Rhythm: Normal rate.   Pulmonary:      Effort: Pulmonary effort is normal.   Musculoskeletal: Normal range of motion.   Skin:     General: Skin is warm and dry.   Neurological:      General: No focal deficit present.      Mental Status: She is alert and oriented to person, place, and time.   Psychiatric:         Attention and Perception: Attention and perception normal.         Mood and Affect: Mood is anxious.         Speech: Speech normal.         Behavior: Behavior normal. Behavior is cooperative.         Thought Content: Thought content normal.         Cognition and Memory: Cognition and memory  normal.         Judgment: Judgment normal.         ED Course      Procedures         No results found for any visits on 02/28/21.  Medications - No data to display     Assessments & Plan (with Medical Decision Making)   The patient was sent to [facility] to be seen there for further evaluation.     I have reviewed the nursing notes. I have reviewed the findings, diagnosis, plan and need for follow up with the patient.    Discharge Medication List as of 2/28/2021  5:21 PM          Final diagnoses:   Anxiety   Depression, unspecified depression type       --  Beverly ARORA [facility] EMERGENCY DEPARTMENT  2/28/2021     Beverly Chadwick MD  03/02/21 [id number]

## 2021-02-28 NOTE — ED PROVIDER NOTES
"ED Provider Note  [facility]      History     Chief Complaint   Patient presents with     Panic Attack     panic attack, that she couldn't control. Crying and upset.      HPI  Merit Nikky Huntley is a 25 year old female with a history of *** who presents with ***.    {Past History:525954}      Review of Systems  {Complete vs limited ROS:934353::\"A complete review of systems was performed with pertinent positives and negatives noted in the HPI, and all other systems negative.\"}    Physical Exam   BP: 131/84  Pulse: 83  Temp: 99.9  F (37.7  C)  Resp: 18  SpO2: 97 %  Physical Exam  ***  ED Course      Procedures        {EKG done?:560894::\" \"}    {ed addendum:738802::\" \"}  {trauma activation or Fall?:074097::\" \"}  {Sepsis/Septic Shock/Stemi/Stroke:163609::\" \"}     No results found for any visits on 02/28/21.  Medications - No data to display     Assessments & Plan (with Medical Decision Making)   ***    I have reviewed the nursing notes. I have reviewed the findings, diagnosis, plan and need for follow up with the patient.    New Prescriptions    No medications on file       Final diagnoses:   None       --  ***  [facility] EMERGENCY DEPARTMENT  2/28/2021  "

## 2021-02-28 NOTE — ED NOTES
Bed: HW03  Expected date: 2/28/21  Expected time: 1:12 PM  Means of arrival:   Comments:  Renae 639 34yo F anxiety / panic attack

## 2021-02-28 NOTE — LETTER
February 28, 2021      To Whom It May Concern:      Mariusz ROSEMARIE Nikky Huntley was seen in our Emergency Department today, 02/28/21.  I have recommended further treatment for her and as a result she will not be able to start work for the next 1-2 weeks until she is improved from the recommended treatment.    Sincerely,        Dayne Gaitan MD

## 2021-02-28 NOTE — DISCHARGE INSTRUCTIONS
Patient with ongoing severe anxiety and depression at this time will be starting a day treatment program as soon as is possible [facility] will be following up tomorrow for intake she will not be returning to work for the next 1 to 2 weeks until she can get established in the day treatment program.

## 2021-03-10 ENCOUNTER — HOSPITAL ENCOUNTER (EMERGENCY)
Facility: CLINIC | Age: 36
Discharge: HOME | End: 2021-03-10
Attending: EMERGENCY MEDICINE | Admitting: EMERGENCY MEDICINE
Payer: COMMERCIAL

## 2021-03-10 VITALS
BODY MASS INDEX: 26.31 KG/M2 | RESPIRATION RATE: 16 BRPM | WEIGHT: 168 LBS | DIASTOLIC BLOOD PRESSURE: 93 MMHG | SYSTOLIC BLOOD PRESSURE: 130 MMHG | HEART RATE: 96 BPM | OXYGEN SATURATION: 95 % | TEMPERATURE: 98.8 F

## 2021-03-10 DIAGNOSIS — F41.9 ANXIETY: ICD-10-CM

## 2021-03-10 DIAGNOSIS — F43.10 POSTTRAUMATIC STRESS DISORDER: ICD-10-CM

## 2021-03-10 DIAGNOSIS — F12.10 CANNABIS ABUSE, CONTINUOUS: ICD-10-CM

## 2021-03-10 DIAGNOSIS — F12.20 CANNABIS DEPENDENCE (H): ICD-10-CM

## 2021-03-10 DIAGNOSIS — F90.9 ATTENTION DEFICIT HYPERACTIVITY DISORDER (ADHD), UNSPECIFIED ADHD TYPE: ICD-10-CM

## 2021-03-10 DIAGNOSIS — F41.1 GENERALIZED ANXIETY DISORDER: ICD-10-CM

## 2021-03-10 DIAGNOSIS — F43.10 PTSD (POST-TRAUMATIC STRESS DISORDER): ICD-10-CM

## 2021-03-10 DIAGNOSIS — F60.89 CHRONIC HYPOMANIC PERSONALITY DISORDER (H): ICD-10-CM

## 2021-03-10 DIAGNOSIS — F41.1 GAD (GENERALIZED ANXIETY DISORDER): ICD-10-CM

## 2021-03-10 PROCEDURE — 99285 EMERGENCY DEPT VISIT HI MDM: CPT | Mod: 25

## 2021-03-10 PROCEDURE — 90791 PSYCH DIAGNOSTIC EVALUATION: CPT

## 2021-03-10 PROCEDURE — 99283 EMERGENCY DEPT VISIT LOW MDM: CPT | Performed by: EMERGENCY MEDICINE

## 2021-03-10 ASSESSMENT — ENCOUNTER SYMPTOMS
DECREASED CONCENTRATION: 1
HALLUCINATIONS: 0
HYPERACTIVE: 0
NERVOUS/ANXIOUS: 1
SLEEP DISTURBANCE: 0
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
## 2021-03-10 NOTE — ED TRIAGE NOTES
"Pt states that she was supposed to get her meds adjusted in day treatment today but \"they fucked up\" . She is weepy and appears scared  "

## 2021-03-10 NOTE — DISCHARGE INSTRUCTIONS
Continue working with your current therapist.     Continue taking your current medications.      Continue with abstaining for marijuana use.     An intake appointment for day treatment was scheduled for tomorrow.     A medication provider appointment was scheduled for March 16 at 3pm.

## 2021-03-10 NOTE — ED PROVIDER NOTES
ED Provider Note  [facility]      History     Chief Complaint   Patient presents with     Anxiety     Pt states that she is very anxious and needs her meds refilled.     HPI  Mariusz Huntley is a 35 year old female with PMH notable for depression and JOSUE with panic attacks presenting for anxiety.  ***    Past Medical History  Past Medical History:   Diagnosis Date     Anxiety      Depression      Intractable chronic migraine without aura      Panic attack      Past Surgical History:   Procedure Laterality Date      SECTION      x 2     cyclobenzaprine (FLEXERIL) 5 MG tablet  escitalopram (LEXAPRO) 10 MG tablet  gabapentin (NEURONTIN) 600 MG tablet  gabapentin (NEURONTIN) 600 MG tablet  hydrOXYzine (ATARAX) 50 MG tablet  melatonin 3 MG tablet  Multiple Vitamins-Minerals (MULTIVITAMIN GUMMIES ADULT PO)  propranolol (INDERAL) 40 MG tablet  traZODone (DESYREL) 100 MG tablet  Vitamin D3 (CHOLECALCIFEROL) 25 mcg (1000 units) tablet  acetaminophen (TYLENOL) 325 MG tablet  albuterol (PROAIR HFA/PROVENTIL HFA/VENTOLIN HFA) 108 (90 Base) MCG/ACT inhaler  ibuprofen (ADVIL/MOTRIN) 600 MG tablet  LORazepam (ATIVAN) 2 MG tablet  methyl salicylate-menthol (TONY-OCAMPO) OINT ointment  ondansetron (ZOFRAN-ODT) 4 MG ODT tab  polyethylene glycol (MIRALAX) 17 GM/Dose powder  senna-docusate (SENOKOT-S/PERICOLACE) 8.6-50 MG tablet  traZODone (DESYREL) 100 MG tablet      Allergies   Allergen Reactions     Honeybees [Bees] Anaphylaxis     Sulfamethoxazole-Trimethoprim Nausea and Vomiting     Codeine      Sulfa Drugs      Amoxicillin Rash     Codeine Rash     Family History  Family History   Problem Relation Age of Onset     Hypertension Maternal Grandmother      Lung Cancer Maternal Grandmother      Hypertension Maternal Grandfather      Lung Cancer Maternal Grandfather      Myocardial Infarction Paternal Grandmother      Diabetes Paternal Grandmother      Hypertension Paternal Grandmother      Lung  "Cancer Paternal Grandmother      Social History   Social History     Tobacco Use     Smoking status: Never Smoker     Smokeless tobacco: Never Used   Substance Use Topics     Alcohol use: Not Currently     Drug use: Yes     Types: Marijuana     Comment: 2 weeks ago      Past medical history, past surgical history, medications, allergies, family history, and social history were reviewed with the patient. No additional pertinent items.       Review of Systems  A complete review of systems was performed with pertinent positives and negatives noted in the HPI, and all other systems negative.    Physical Exam   BP: (!) 146/97  Pulse: 96  Temp: 98.8  F (37.1  C)  Resp: 16  SpO2: 95 %  Physical Exam  ***  ED Course      Procedures        {EKG done?:277941::\" \"}    {ed addendum:680963::\" \"}  {trauma activation or Fall?:365419::\" \"}  {Sepsis/Septic Shock/Stemi/Stroke:429133::\" \"}     No results found for any visits on 03/10/21.  Medications - No data to display     Assessments & Plan (with Medical Decision Making)   ***    I have reviewed the nursing notes. I have reviewed the findings, diagnosis, plan and need for follow up with the patient.    New Prescriptions    No medications on file       Final diagnoses:   None       --  Beverly Black***  [facility] EMERGENCY DEPARTMENT  3/10/2021  "

## 2021-03-11 ENCOUNTER — HOSPITAL ENCOUNTER (OUTPATIENT)
Dept: BEHAVIORAL HEALTH | Facility: CLINIC | Age: 36
Discharge: HOME | End: 2021-03-11
Attending: FAMILY MEDICINE | Admitting: FAMILY MEDICINE
Payer: COMMERCIAL

## 2021-03-11 DIAGNOSIS — F33.1 MAJOR DEPRESSIVE DISORDER, RECURRENT EPISODE, MODERATE (H): ICD-10-CM

## 2021-03-11 DIAGNOSIS — F43.10 PTSD (POST-TRAUMATIC STRESS DISORDER): ICD-10-CM

## 2021-03-11 PROCEDURE — 90791 PSYCH DIAGNOSTIC EVALUATION: CPT | Mod: GT | Performed by: SOCIAL WORKER

## 2021-03-11 ASSESSMENT — COLUMBIA-SUICIDE SEVERITY RATING SCALE - C-SSRS
2. HAVE YOU ACTUALLY HAD ANY THOUGHTS OF KILLING YOURSELF?: NO
5. HAVE YOU STARTED TO WORK OUT OR WORKED OUT THE DETAILS OF HOW TO KILL YOURSELF? DO YOU INTEND TO CARRY OUT THIS PLAN?: NO
6. HAVE YOU EVER DONE ANYTHING, STARTED TO DO ANYTHING, OR PREPARED TO DO ANYTHING TO END YOUR LIFE?: NO
4. HAVE YOU HAD THESE THOUGHTS AND HAD SOME INTENTION OF ACTING ON THEM?: NO
2. HAVE YOU ACTUALLY HAD ANY THOUGHTS OF KILLING YOURSELF LIFETIME?: NO
TOTAL  NUMBER OF ABORTED OR SELF INTERRUPTED ATTEMPTS PAST 3 MONTHS: NO
ATTEMPT LIFETIME: NO
1. IN THE PAST MONTH, HAVE YOU WISHED YOU WERE DEAD OR WISHED YOU COULD GO TO SLEEP AND NOT WAKE UP?: NO
1. IN THE PAST MONTH, HAVE YOU WISHED YOU WERE DEAD OR WISHED YOU COULD GO TO SLEEP AND NOT WAKE UP?: NO
ATTEMPT PAST THREE MONTHS: NO
TOTAL  NUMBER OF ABORTED OR SELF INTERRUPTED ATTEMPTS PAST LIFETIME: NO
3. HAVE YOU BEEN THINKING ABOUT HOW YOU MIGHT KILL YOURSELF?: NO
5. HAVE YOU STARTED TO WORK OUT OR WORKED OUT THE DETAILS OF HOW TO KILL YOURSELF? DO YOU INTEND TO CARRY OUT THIS PLAN?: NO
TOTAL  NUMBER OF INTERRUPTED ATTEMPTS LIFETIME: NO
TOTAL  NUMBER OF INTERRUPTED ATTEMPTS PAST 3 MONTHS: NO
6. HAVE YOU EVER DONE ANYTHING, STARTED TO DO ANYTHING, OR PREPARED TO DO ANYTHING TO END YOUR LIFE?: NO
4. HAVE YOU HAD THESE THOUGHTS AND HAD SOME INTENTION OF ACTING ON THEM?: NO

## 2021-03-11 NOTE — PROGRESS NOTES
LOCUS Worksheet     Name: Mariusz Huntley MRN: [id number]    : 1985      Gender:  female    PMI:     Provider Name: [facility]    Provider NPI:  [id number]     Actual level of Care Provided:  Therapy    Service(s) receiving or referred to:  Partial hospitalization Program    Reason for Variance: For symptom management due to worsening mental health symptoms, decline in functioning, and recent hospitalization      Rating completed by: TOMAS Castorena, LICSW       I. Risk of Harm:   1      Minimal Risk of Harm    II. Functional Status:   5      Serious Impairment    III. Co-Morbidity:   2      Minor Co-Morbidity    IV - A. Recovery Environment - Level of Stress:   4      Highly Stress Environment    IV - B. Recovery Environment - Level of Support:   2      Supportive Environment    V. Treatment and Recovery History:   4      Poor Response to Treatment and Recovery Management    VI. Engagement and Recovery Project:   2      Positive Engagement and Recovery       20 Composite Score    Level of Care Recommendation:   20 to 22       Medically Monitored Non-Residential Services

## 2021-03-11 NOTE — PATIENT INSTRUCTIONS
Welcome to the Adult Mental Health Outpatient Programs. Thank you for choosing us at [facility]!    Managing mental health symptoms while balancing life stressors can be a struggle. Our mental health programming will provide you the therapy, education, skills and support needed to improve your well-being and to live a healthy and more manageable lifestyle.    After completing the Diagnostic Assessment, you will receive a recommendation for a level of care or specialty service that is right for you. Our Outpatient Mental Health programs are all group-based and allow you to meet with peers who are trying to manage similar symptoms or life circumstances in a safe and therapeutic setting.    Program Recommendations for:  You will be scheduled to join the following program: Adult Partial Hospitalization Program  You will be in the follow group /track:  Track # 1  Please attend:  Monday through Friday  at:  9a-3p and E/O Tuesday 1p-6p  Your scheduled start of the Program is on: Friday, March 12, 2021  What will happen next?   You will receive a series of calls from our [facility] providers and/or schedulers to prepare you for your program participation.    1. Admission Call: Program staff will contact you after your diagnostic assessment to provide a brief check in and to complete the admission process. We will ask you about concerns that may need to be addressed right away. This could include: personal safety, insurance clarification, technology access, or another concern you may have. This call may occur days in advance or right before your first scheduled group.    2. Physical Health Screen Call:  A nurse will contact you either prior to admission or during your first week in the program to ask a few required physical health screening questions and discuss concerns as needed.   3. Provider/Scheduling Call: Program staff may also call to schedule an individual appointment with a psychiatrist or  psychologist (therapist). This will depend on your needs and may be required for the program that was recommended for you. This program requirement does NOT replace your follow up with your community provider.  However, it is important that you do NOT see your community psychiatric provider on the same day that you see the program psychiatrist. If you do, this could result in a denial from your insurance. Please let us know if you have any concerns and we will help you.   What if I still have questions or cannot attend as planned?  We hope to be able to answer your questions during the admission call. You can also reach out to us by contacting the program directly at:  Partial Hospitalization Program [phone].  Sincerely,     Your Outpatient Adult Mental Health Program Team

## 2021-03-11 NOTE — PROGRESS NOTES
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 Efforts to avoid thoughts, feelings, or conversations associated with the trauma.      - Efforts to avoid activities, places, or people that arouse recollections of the trauma.      - Markedly diminished interest or participation in significant activities.   D. Persistent symptoms of increased arousal (not present before the trauma), as indicated by two (or more) of the following:     - Difficulty falling or staying asleep.      - Irritability or outbursts of anger.      - Difficulty concentrating.      - Hypervigilance.      - Exaggerated startle response.   E. Duration of the disturbance is more than 1 month.  F. The disturbance causes clinically significant distress or impairment in social, occupational, or other important areas of functioning.    Functional Status:  Patient reports the following functional impairments: management of the household and or completion of tasks, relationship(s), social interactions and work / vocational responsibilities.     WHODAS:   WHODAS 2.0 Total Score 3/10/2021   Total Score 32   Total Score MyChart 32     Programmatic care:  Current LOCUS was assessed and patient needs the following level of care based on score 20   .    Clinical Summary:  1. Reason for assessment: Patient was referred by ED provider due to worsening symptoms   .  2. Psychosocial, Cultural and Contextual Factors: recent traumatic events  .  3. Principal DSM5 Diagnoses  (Sustained by DSM5 Criteria Listed Above):   309.81 (F43.10) Posttraumatic Stress Disorder (includes Posttraumatic Stress Disorder for Children 6 Years and Younger)  Without dissociative symptoms.  4. Other Diagnoses that is relevant to services:   296.32 (F33.1) Major Depressive Disorder, Recurrent Episode, Moderate _.  5. Provisional Diagnosis:  NA.  6. Prognosis: Return to Normal Functioning, Expect Improvement and Relieve Acute Symptoms.  7. Likely consequences of symptoms if not treated: If untreated, patient's mental health will likely deteriorate and may require a higher level of care such as rehospitalization  8. Client strengths include:  goal-focused, good listener, has a previous history of therapy, insightful, intelligent and motivated .     Recommendations:     1. Plan for Safety and Risk Management:   Recommended that patient call 911 or go to the local ED should there be a change in any of these risk factors..  Crisis numbers provided to patient.        Report to child / adult protection services was NA.     2. Patient did not identify Mu-ism, ethnic or cultural issues relevant to therapy at this time     3. Initial Treatment will focus on:    Depressed Mood -   Anxiety -   Functional Impairment at: home and work.     4. Resources/Service Plan:    services are not indicated.   Modifications to assist communication are not indicated.   Additional disability accommodations are not indicated.      5. Collaboration:   Collaboration / coordination of treatment will be initiated with the following  support professionals: outpatient therapist and psychiatry.      6.  Referrals:   The following referral(s) will be initiated: Partial Hospitalization Program. Next Scheduled  "Appointment: 3/12/21.     A Release of Information has been obtained for the following: outpatient therapist, psychiatry and emergency contact.    7. SOPHIE:    SOPHIE:  Discussed the general effects of drugs and alcohol on health and well-being. Recommendations:  Abstain from all mood altering substances unless prescribed by a physician..  .     8. Records:   These were reviewed at time of assessment.   Information in this assessment was obtained from the medical record and  provided by patient who is a good historian.    Patient will have open access to their mental health medical record.      Provider Name/ Credentials:  TOMAS Castorena, [facility]   March 11, 2021        Outpatient Mental Health Services - Adult    MY COPING PLAN FOR SAFETY    PATIENT'S NAME: Mariusz Huntley  MRN:   [id number]    SAFETY PLAN:    Step 1: Warning signs / cues (Thoughts, images, mood, situation, behavior) that a crisis may be developing:      Thoughts: none    Images: flashbacks    Thinking Processes: none    Mood: worsening depression, hopelessness, helplessness and intense worry    Behaviors: not taking care of my responsibilities    Situations: trauma        Step 2: Coping strategies - Things I can do to take my mind off of my problems without contacting another person (relaxation technique, physical activity):      Distress Tolerance Strategies:  relaxation activities: watch TV    Physical Activities: go for a walk    Focus on helpful thoughts:  \"I will get through this\"    Step 3: People and social settings that provide distraction:     Name: Keron ()     Step 4: Remind myself of people and things that are important to me and worth living for:  Family    Step 5: When I am in crisis, I can ask these people to help me use my safety plan:     Keron ()       Step 6: Making the environment safe:       none    Step 7: Professionals or agencies I can contact during a crisis:      Suicide Prevention Lifeline: " 7-279-115-TALK (1330)    Crisis Text Line Service (available 24 hours a day, 7 days a week): Text [place] to 063687    Call  **CRISIS (105166) from a cell phone to talk to a team of professionals who can help you.    Crisis Services By County: Phone Number:   [place]     [phone]   [place]    [phone]   [place]    [phone]   [place]    [phone]   [place]    [phone]   [place] [phone]   [place]     [phone]       Call 911 or go to my nearest emergency department.     I helped develop this safety plan and agree to use it when needed.  I have been given a copy of this plan.      Client signature _________________________________________________________________      Electronically signed by YAW Simmons on 03/11/21 at 9:42 AM     Today s date:  3/11/2021  Adapted from Safety Plan Template 2008 Cinthia Velazco and Jhonatan Ulloa is reprinted with the express permission of the authors.  No portion of the Safety Plan Template may be reproduced without the express, written permission.  You can contact the authors at [email or web address] or [email or web address]

## 2021-03-12 ENCOUNTER — HOSPITAL ENCOUNTER (OUTPATIENT)
Dept: BEHAVIORAL HEALTH | Facility: CLINIC | Age: 36
End: 2021-03-12
Attending: PSYCHIATRY & NEUROLOGY
Payer: COMMERCIAL

## 2021-03-12 DIAGNOSIS — F43.10 PTSD (POST-TRAUMATIC STRESS DISORDER): ICD-10-CM

## 2021-03-12 DIAGNOSIS — F33.1 MAJOR DEPRESSIVE DISORDER, RECURRENT EPISODE, MODERATE (H): ICD-10-CM

## 2021-03-12 DIAGNOSIS — F43.10 PTSD (POST-TRAUMATIC STRESS DISORDER): Primary | ICD-10-CM

## 2021-03-12 PROCEDURE — H0035 MH PARTIAL HOSP TX UNDER 24H: HCPCS | Mod: 95 | Performed by: COUNSELOR

## 2021-03-12 PROCEDURE — H0035 MH PARTIAL HOSP TX UNDER 24H: HCPCS | Mod: 95 | Performed by: SOCIAL WORKER

## 2021-03-12 PROCEDURE — H0035 MH PARTIAL HOSP TX UNDER 24H: HCPCS | Mod: GT

## 2021-03-12 PROCEDURE — H0035 MH PARTIAL HOSP TX UNDER 24H: HCPCS | Mod: 95 | Performed by: OCCUPATIONAL THERAPIST

## 2021-03-12 RX ORDER — DIVALPROEX SODIUM 250 MG/1
TABLET, EXTENDED RELEASE ORAL
Qty: 120 TABLET | Refills: 0 | Status: ON HOLD | OUTPATIENT
Start: 2021-03-12 | End: 2021-05-04

## 2021-03-12 NOTE — GROUP NOTE
Process Group Note    PATIENT'S NAME: Mariusz Huntley  MRN:   [id number]  :   1985  ACCT. NUMBER: [id number]  DATE OF SERVICE: 3/12/21  START TIME:  9:00 AM  END TIME:  9:50 AM  FACILITATOR: Zuri Armando LPCC  TOPIC:  Process Group    Diagnoses:  309.81 (F43.10) Posttraumatic Stress Disorder (includes Posttraumatic Stress Disorder for Children 6 Years and Younger)  Without dissociative symptoms.  4. Other Diagnoses that is relevant to services:   296.32 (F33.1) Major Depressive Disorder, Recurrent Episode, Moderate _.      [facility] Adult Partial Hospitalization Program  TRACK: [facility]    NUMBER OF PARTICIPANTS: 7    Telemedicine Visit: The patient's condition can be safely assessed and treated via synchronous audio and visual telemedicine encounter.      Reason for Telemedicine Visit: Services only offered telehealth and due to COVID-19    Originating Site (Patient Location): Patient's home    Distant Site (Provider Location): Provider Remote Setting    Consent:  The patient/guardian has verbally consented to: the potential risks and benefits of telemedicine (video visit) versus in person care; bill my insurance or make self-payment for services provided; and responsibility for payment of non-covered services.     Mode of Communication:  Video Conference via Zoom  As the provider I attest to compliance with applicable laws and regulations related to telemedicine.            Data:    Session content: At the start of this group, patients were invited to check in by identifying themselves, describing their current emotional status, and identifying issues to address in this group.   Area(s) of treatment focus addressed in this session included Symptom Management, Personal Safety and Community Resources/Discharge Planning.    Patient reported feeling anxious. Patient discussed working toward working through anxiety. Patient identified sharing in group as skills they will use to address their  goal(s). Patient reported low concentration may be a barrier to working toward their goal(s) and/or addressing mental health symptoms. Patient reported no safety concerns and/or self-injurious behaviors. Patient reported no substance use. Patient reported they are taking their medications as prescribed. Patient reported feeling proud that they got a tattoo last night. Patient discussed her first day with the treatment group.     Therapeutic Interventions/Treatment Strategies:  Psychotherapist offered support, feedback and validation and reinforced use of skills. Treatment modalities used include Motivational Interviewing and Cognitive Behavioral Therapy. Interventions include Coping Skills: Assisted patient in identifying 1-2 healthy distraction skills to reduce overall distress, Symptoms Management: Promoted understanding of their diagnoses and how it impacts their functioning and Emotions Management:  Discussed barriers to emotional regulation.    Assessment:    Patient response:   Patient responded to session by accepting feedback, giving feedback, listening, focusing on goals, being attentive and accepting support    Possible barriers to participation / learning include: and no barriers identified    Health Issues:   None reported       Substance Use Review:   Substance Use: No active concerns identified.    Mental Status/Behavioral Observations  Appearance:   Appropriate   Eye Contact:   Good   Psychomotor Behavior: Normal   Attitude:   Cooperative   Orientation:   All  Speech   Rate / Production: Normal/ Responsive Normal    Volume:  Normal   Mood:    Anxious  Depressed  Normal  Affect:    Appropriate   Thought Content:   Clear and Safety denies any current safety concerns including suicidal ideation, self-harm, and homicidal ideation  Thought Form:  Coherent  Logical     Insight:    Good     Plan:     Safety Plan: No current safety concerns identified.  Recommended that patient call 911 or go to the local ED  should there be a change in any of these risk factors.     Barriers to treatment: None identified    Patient Contracts (see media tab):  None    Substance Use: Provided encouragement towards sobriety     Continue or Discharge: Patient will continue in Adult Partial Hospitalization Program (PHP)  as planned. Patient is likely to benefit from learning and using skills as they work toward the goals identified in their treatment plan.      Zuri Armando, [facility]  March 12, 2021

## 2021-03-12 NOTE — GROUP NOTE
Psychotherapy Group Note    PATIENT'S NAME: Mariusz Huntley  MRN:   [id number]  :   1985  ACCT. NUMBER: [id number]  DATE OF SERVICE: 3/12/21  START TIME:  2:00 PM  END TIME:  2:50 PM  FACILITATOR: Karine Mallory LICSW  TOPIC: MH EBP Group: Self-Awareness  [facility] Adult Partial Hospitalization Program  TRACK: 1    NUMBER OF PARTICIPANTS: 7    Summary of Group / Topics Discussed:  Self-Awareness: Self-Compassion: Patients received overview of key concepts in developing self-compassion. Patients discussed mindfulness, self-kindness, and finding common humanity. Patients identified their current approach to problems in their lives and learned skills for increasing self-compassion. Patients identified ways they can put self-compassion skills into practice and problem solve barriers to application of skills.     Patient Session Goals / Objectives:    [place] components of self-compassion    Identify ways to practice self-compassion in daily life    Problem solve barriers to self-compassion practice    Telemedicine Visit: The patient's condition can be safely assessed and treated via synchronous audio and visual telemedicine encounter.          Reason for Telemedicine Visit: Services only offered telehealth and covid19        Originating Site (Patient Location): Patient's home        Distant Site (Provider Location): Provider Remote Setting        Consent:  The patient/guardian has verbally consented to: the potential risks and benefits of telemedicine (video visit) versus in person care; bill my insurance or make self-payment for services provided; and responsibility for payment of non-covered services.         Mode of Communication:  Video Conference via Gatheredtable        As the provider I attest to compliance with applicable laws and regulations related to telemedicine.       Patient Participation / Response:  Moderately participated, sharing some personal reflections / insights and  adequately adequately received / provided feedback with other participants.    Practiced skills in session    Treatment Plan:  Patient has an initial individualized treatment plan that was created as part of their diagnostic assessment / admission process.  A master individualized treatment plan is in the process of being developed with the patient and multi-disciplinary care team.    YAW Manley
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
## 2021-03-12 NOTE — GROUP NOTE
Psychoeducation Group Note    PATIENT'S NAME: Mariusz Huntley  MRN:   [id number]  :   1985  ACCT. NUMBER: [id number]  DATE OF SERVICE: 3/12/21  START TIME: 10:00 AM  END TIME: 10:50 AM  FACILITATOR: Raman Guerra OT  TOPIC: [facility] OT Group: Self- Regulation Skills  [facility] Adult Partial Hospitalization Program  TRACK: 1    Telemedicine Visit: The patient's condition can be safely assessed and treated via synchronous audio and visual telemedicine encounter.      Reason for Telemedicine Visit: Services only offered telehealth and Covid 19    Originating Site (Patient Location): Patient's home    Distant Site (Provider Location): [facility]: [facility]  Outpatient Mental Health and Addiction Services    Consent:  The patient/guardian has verbally consented to: the potential risks and benefits of telemedicine (video visit) versus in person care; bill my insurance or make self-payment for services provided; and responsibility for payment of non-covered services.     Mode of Communication:  Video Conference via Medical Zoom    As the provider I attest to compliance with applicable laws and regulations related to telemedicine.      NUMBER OF PARTICIPANTS: 7    Summary of Group / Topics Discussed:  Self-Regulation Skills: Sensory Enhanced Mindfulness: Patients were provided with written and verbal psychoeducation on the concept of integrating mindfulness with a bottom up, sensory rich, experiential intervention activities to develop self-awareness and skills for self-regulation.  Emphasis placed on the benefits of mindfulness and how practicing mindfulness develops the skills so they can help emotionally ground oneself or provide a healthy distraction to self-regulate when distressed. Patients worked to increase knowledge and skills during a guided skilled structured sensory enhanced activity: Safe and functional breathing practices and Qi Gong an active  meditation practice (licensed certified instructor) can build resiliency and self-efficacy with self regulation. Facilitation of reflection to reinforce taught concepts was provided.     Patient Session Goals / Objectives:    Identified how using sensory enhanced mindfulness practices can be used for grounding, stress management, and self-regulation      Improved awareness of different types of sensory enhanced mindfulness activities that assist with healthy coping of stress and symptoms      Established a plan for practice of these skills in their own environments    Practiced and reflected on how to generalize taught skills to their everyday life        Patient Participation / Response:  Fully participated with the group by sharing personal reflections / insights and openly received / provided feedback with other participants.    Patient presentation: First day in program. Good effort, constricted affect. Reports the practice was calming to her. , Verbalized understanding of content and Patient would benefit from additional opportunities to practice the content to be able to generalize it to their everyday life with increased intentionality, consistency, and efficacy in support of their psychiatric recovery    Provided brief orientation to Occupational therapy process.     Treatment Plan:  OT will continue to monitor and assess and work with patient in identifying OT goals in subsequent sessions.     Patient has an initial individualized treatment plan that was created as part of their diagnostic assessment / admission process.  A master individualized treatment plan is in the process of being developed with the patient and multi-disciplinary care team.    Raman Guerra, OT

## 2021-03-12 NOTE — GROUP NOTE
Psychoeducation Group Note    PATIENT'S NAME: Mariusz Huntley  MRN:   [id number]  :   1985  ACCT. NUMBER: [id number]  DATE OF SERVICE: 3/12/21  START TIME: 11:00 AM  END TIME: 11:50 AM  FACILITATOR: Sheila Henderson OTR/L  TOPIC: [facility] OT Group: Lifestyle Balance and Structure  [facility] Adult Partial Hospitalization Program  TRACK: 1    NUMBER OF PARTICIPANTS: 6    Telemedicine Visit: The patient's condition can be safely assessed and treated via synchronous audio and visual telemedicine encounter.      Reason for Telemedicine Visit: Services only offered telehealth    Originating Site (Patient Location): Patient's home    Distant Site (Provider Location): [facility]    Consent:  The patient/guardian has verbally consented to: the potential risks and benefits of telemedicine (video visit) versus in person care; bill my insurance or make self-payment for services provided; and responsibility for payment of non-covered services.     Mode of Communication:  Video Conference via Zoom    As the provider I attest to compliance with applicable laws and regulations related to telemedicine.     Summary of Group / Topics Discussed:  Lifestyle Balance and Structure:  OT Goal-setting & integration: Weekend Wellness: The group focused on reflecting on the past week and identifying insights and positive experiences that they want to build upon further.  The group also focused on identifying needs and any concerns for the upcoming weekend and created a list of activities and tasks that they might engage in to help support themselves and add structure their weekend.  Facilitated the sharing of individual insights and plans with validation, support, and feedback provided.    Patient Session Goals / Objectives:    Reflected on insights learned and positive experiences over the last week to help with their recovery and wellbeing    Identified needs and  concerns for the upcoming weekend and identified ways to address these    Created a written list of possible ways to structure their weekend    Identified plan to support follow through on plans and reflection on progress made         Patient Participation / Response:  Fully participated with the group by sharing personal reflections / insights and openly received / provided feedback with other participants.    Patient presentation: constricted affect; mood seemed low; active in group discussion; has plans for the weekend with daughters, Verbalized understanding of content and Patient would benefit from additional opportunities to practice the content to be able to generalize it to their everyday life with increased intentionality, consistency, and efficacy in support of their psychiatric recovery     Mariusz began the Partial Program today.  She was welcomed and briefly oriented to Occupational Therapy groups. Encouraged her to ask questions.    Treatment Plan:  Patient has an initial individualized treatment plan that was created as part of their diagnostic assessment / admission process.  A master individualized treatment plan is in the process of being developed with the patient and multi-disciplinary care team.  Will continue to assess and and OT will complete OT Assessment with her in the next couple of days.     AILYN Chahal/L

## 2021-03-12 NOTE — GROUP NOTE
Psychotherapy Group Note    PATIENT'S NAME: Mariusz Huntley  MRN:   [id number]  :   1985  ACCT. NUMBER: [id number]  DATE OF SERVICE: 3/12/21  START TIME:  1:00 PM  END TIME:  1:50 PM  FACILITATOR: Zuri Armando LPCC  TOPIC: MH EBP Group: Symptom Awareness  [facility] Adult Partial Hospitalization Program  TRACK: [facility]    NUMBER OF PARTICIPANTS: 6    Summary of Group / Topics Discussed:  Symptom Awareness: Mood Disorders: Patients received a general overview of mood disorders including depressive disorders, anxiety disorders, and bipolar disorders and how it relates to their current symptoms. The purpose is to promote understanding of their diagnoses and how it impacts their functioning. Patients reviewed their current awareness of symptoms and diagnoses. Patients received information regarding diagnoses, etiology, cultural, and environmental factors as well as impact on functioning.     Patient Session Goals / Objectives:    Discussed patient individual symptoms and experiences    Reviewed diagnostic criteria and etiology of diagnoses     Telemedicine Visit: The patient's condition can be safely assessed and treated via synchronous audio and visual telemedicine encounter.      Reason for Telemedicine Visit: Services only offered telehealth and due to COVID-19    Originating Site (Patient Location): Patient's home    Distant Site (Provider Location): Provider Remote Setting    Consent:  The patient/guardian has verbally consented to: the potential risks and benefits of telemedicine (video visit) versus in person care; bill my insurance or make self-payment for services provided; and responsibility for payment of non-covered services.     Mode of Communication:  Video Conference via Zoom    As the provider I attest to compliance with applicable laws and regulations related to telemedicine.        Patient Participation / Response:  Fully participated with the group by sharing personal  reflections / insights and openly received / provided feedback with other participants.    Demonstrated understanding of topics discussed through group discussion and participation, Demonstrated understanding of how information regarding symptoms can assist in management of symptoms and Identified / Expressed personal readiness to increase awareness of symptoms and apply skills as necessary    Treatment Plan:  Patient has an initial individualized treatment plan that was created as part of their diagnostic assessment / admission process.  A master individualized treatment plan is in the process of being developed with the patient and multi-disciplinary care team.    Zuri Armando, [facility]

## 2021-03-15 ENCOUNTER — TELEPHONE (OUTPATIENT)
Dept: BEHAVIORAL HEALTH | Facility: CLINIC | Age: 36
End: 2021-03-15

## 2021-03-15 ENCOUNTER — HOSPITAL ENCOUNTER (OUTPATIENT)
Dept: BEHAVIORAL HEALTH | Facility: CLINIC | Age: 36
End: 2021-03-15
Attending: PSYCHIATRY & NEUROLOGY
Payer: COMMERCIAL

## 2021-03-15 ENCOUNTER — TELEPHONE (OUTPATIENT)
Dept: BEHAVIORAL HEALTH | Facility: CLINIC | Age: 36
End: 2021-03-15
Payer: COMMERCIAL

## 2021-03-15 PROCEDURE — H0035 MH PARTIAL HOSP TX UNDER 24H: HCPCS | Mod: 95 | Performed by: OCCUPATIONAL THERAPIST

## 2021-03-15 PROCEDURE — H0035 MH PARTIAL HOSP TX UNDER 24H: HCPCS | Mod: 95

## 2021-03-15 PROCEDURE — H0035 MH PARTIAL HOSP TX UNDER 24H: HCPCS | Mod: 95 | Performed by: COUNSELOR

## 2021-03-15 PROCEDURE — H0035 MH PARTIAL HOSP TX UNDER 24H: HCPCS | Mod: GT

## 2021-03-15 NOTE — TELEPHONE ENCOUNTER
"RN Review of Medical History / Physical Health Screen  Outpatient Mental Health Programs - [facility] Adult Partial Hospitalization Program    PATIENT'S NAME: Mariusz Huntley  MRN:   [id number]  :   1985  ACCT. NUMBER: [id number]  CURRENT AGE:  35 year old    DATE OF DIAGNOSTIC ASSESSMENT: 3/12/21  DATE OF ADMISSION: 3/12/21     Please see Diagnostic Assessment for additional Medical History.     General Health:   Have you had any exposure to any communicable disease in the past 2-3 weeks? no     Are you aware of safe sex practices? yes       Nutrition:    Are you on a special diet? If yes, please explain:  no   Do you have any concerns regarding your nutritional status? If yes, please explain:  no   Have you had any appetite changes in the last 3 months?  Yes- \"some decreased r/t anxiety\"     Have you had any weight loss or weight gain in the last 3 months?  Yes, how much? 15lbs     Do you have a history of an eating disorder? no   Do you have a history of being in an eating disorder program? no     Patient height and weight recorded by RN in epic flowsheet: no    No; Unable to measure  Because of temporary in-person programmatic suspension due to COVID-19 pandemic, all pt weights and heights will be collected through patient self-report an recorded in physical health screening progress note upon admission to the program.                            Height/Weight Review:  Patient reported height:     5'7\"   Patient reports weight:  Date last checked:  164lb   Any referrals/needs identified?     no     BMI Review:  Was the patient informed of BMI? no      Findings  No Intervention         Fall Risk:   Have you had any falls in the past 3 months? no     Do you currently use any assistive devices for mobility?   no      Additional Comments/Assessment: denies outstanding medical concerns at this time    Per completion of the Medical History / Physical Health Screen, is there a " recommendation to see / follow up with a primary care physician/clinic or dentist?    No.      Tomeka Teixeira RN  3/15/2021

## 2021-03-15 NOTE — PROGRESS NOTES
[facility]   Dr. Villa's Psychiatric Progress Note  03/15/2021      Patient:  Mariusz Huntley   Medical Record Number:  [id number]  :  1985    Telemedicine Visit: The patient's condition can be safely assessed and treated via synchronous audio and visual telemedicine encounter.       Reason for Telemedicine Visit: COVID-19 lockdown     Originating Site (Patient Location):  HOME     Distant Site (Provider Location): [facility]: [place]     Consent:  The patient/guardian has verbally consented to: the potential risks and benefits of telemedicine (video visit) versus in person care; bill my insurance or make self-payment for services provided; and responsibility for payment of non-covered services.        Interim History:   The patient's care was discussed with the treatment team and chart notes were reviewed.    3/15/2:  VPA was started on 3/12/21.  Weekend was ok.  She was down most of the weekend, even though she had her kids.  Emotionally rough weekend.      Psychiatric ROS:  Mood: depressed  Sleep:  Not the best;  Uses Trazodone and Melatonin;  Tossed and turned  Appetite:  better  Eating: better, eating more  Energy Level:LOW  Concentration/Memory Problems:  better;  was able to journal yesterday  Suicidal Thoughts:No  Homicidal Thoughts:No  Psychotic Symptoms: No  Medication Side Effects:No  Medication Compliance:Yes   Physical Complaints:positive for diarrhea         Medications:     PAST PSYCH MEDS:  Depakote ER, Wellbutrin, Zoloft, Lexapro, Neurontin, Vistaril, trazodone and Ritalin.    Current Outpatient Medications   Medication Sig     acetaminophen (TYLENOL) 325 MG tablet Take 2 tablets (650 mg) by mouth every 4 hours as needed for mild pain (to moderate pain)     albuterol (PROAIR HFA/PROVENTIL HFA/VENTOLIN HFA) 108 (90 Base) MCG/ACT inhaler Inhale 2 puffs into the lungs every 4 hours as needed for shortness of breath / dyspnea or wheezing  (only needed since recovering from covid several months ago)     cyclobenzaprine (FLEXERIL) 5 MG tablet Take 1 tablet (5 mg) by mouth 3 times daily as needed for muscle spasms     divalproex sodium extended-release (DEPAKOTE ER) 250 MG 24 hr tablet Take 1 po at bedtime x 2 days then 2 po at bedtime x 2 days then 3 po at bedtime x 2 days then 4 po qHS     escitalopram (LEXAPRO) 10 MG tablet Take 1 tablet (10 mg) by mouth daily     gabapentin (NEURONTIN) 600 MG tablet Take 800 mg by mouth 3 times daily      gabapentin (NEURONTIN) 600 MG tablet Take 1 tablet (600 mg) by mouth 4 times daily     hydrOXYzine (ATARAX) 50 MG tablet Take 2 tablets (100 mg) by mouth every 4 hours as needed for anxiety     ibuprofen (ADVIL/MOTRIN) 600 MG tablet Take 1 tablet (600 mg) by mouth every 6 hours as needed for moderate pain     LORazepam (ATIVAN) 2 MG tablet Take 2 mg by mouth every 6 hours as needed for anxiety Reports 10 mg ??     melatonin 3 MG tablet Take 1-2 tablets (3-6 mg) by mouth nightly as needed for sleep     methyl salicylate-menthol (TONY-OCAMPO) OINT ointment Apply 1 g topically every 4 hours as needed (pain, apply to back)     Multiple Vitamins-Minerals (MULTIVITAMIN GUMMIES ADULT PO) Take 4 tablets by mouth daily     ondansetron (ZOFRAN-ODT) 4 MG ODT tab Take 4 mg by mouth every 6 hours as needed for nausea or vomiting           propranolol (INDERAL) 40 MG tablet Take 1 tablet (40 mg) by mouth 2 times daily     senna-docusate (SENOKOT-S/PERICOLACE) 8.6-50 MG tablet Take 1 tablet by mouth 2 times daily as needed for constipation     traZODone (DESYREL) 100 MG tablet Take 100 mg by mouth At Bedtime     traZODone (DESYREL) 100 MG tablet Take 100 mg by mouth At Bedtime     Vitamin D3 (CHOLECALCIFEROL) 25 mcg (1000 units) tablet Take 2 tablets (50 mcg) by mouth daily     No current facility-administered medications for this encounter.              Allergies:     Allergies   Allergen Reactions     Honeybees [Bees] Anaphylaxis      Sulfamethoxazole-Trimethoprim Nausea and Vomiting     Codeine      Sulfa Drugs      Amoxicillin Rash     Codeine Rash            Psychiatric Examination:   LMP 02/20/2021 (Exact Date)   Weight is 0 lbs 0 oz  There is no height or weight on file to calculate BMI.    Appearance:  awake, alert and adequately groomed  Attitude:  cooperative  Eye Contact:  good  Mood:  depressed  Affect:  mood congruent  Speech:  clear, coherent  Psychomotor Behavior:  no evidence of tardive dyskinesia, dystonia, or tics  Throught Process:  logical  Associations:  no loose associations  Thought Content:  no evidence of suicidal ideation or homicidal ideation and no evidence of psychotic thought  Insight:  good  Judgement:  intact  Oriented to:  time, person, and place  Attention Span and Concentration:  intact  Recent and Remote Memory:  intact  Gait:Normal    Risk/Potential for Dangerousness:  Multiple Active Diagnoses:HIGH  Self Care:HIGH  Suicide:LOW  Assault:LOW  Self Injurious Behaviors:LOW  Inappropriate Sexual Behavior:LOW         Labs:   No results found for this or any previous visit (from the past 24 hour(s)).     No results found for this or any previous visit (from the past 1008 hour(s)).      Impression:   This is a 35 year old female continues PHP for mood stabilization.  Mood is depressed.  Groups are going well.           DIagnoses:     Axis I:     1.  Major depressive disorder, recurrent.     2.  Generalized anxiety disorder.     3.  Posttraumatic stress disorder.   4.  Cannabis use disorder.     5.  Alcohol use disorder, in remission.   Axis II:  Deferred.   Axis III:  History of concussion 02/2020.            Plan:     Continue [facility].   Started Depakote  mg at bedtime for 2 days, then increasing to 5 mg at bedtime for 2 days, then increasing to 750 mg at bedtime for 2 days, then increasing to 1000 mg at bedtime.   Will order labs once she  reaches steady state.   Will likely taper off some of the medications which she feels are not helping.   Expect stabilization and completion of Partial Hospital Program.   Follow up with current outpatient medication prescriber and therapist.     Archie Villa MD

## 2021-03-15 NOTE — GROUP NOTE
Process Group Note    PATIENT'S NAME: Mariusz Huntley  MRN:   [id number]  :   1985  ACCT. NUMBER: [id number]  DATE OF SERVICE: 3/15/21  START TIME: 10:00 AM  END TIME: 10:50 AM  FACILITATOR: Zuri Armando LPCC  TOPIC:  Process Group    Diagnoses:  Axis I:     1.  Major depressive disorder, recurrent.     2.  Generalized anxiety disorder.     3.  Posttraumatic stress disorder.   4.  Cannabis use disorder.     5.  Alcohol use disorder, in remission.   Axis II:  Deferred.   Axis III:  History of concussion 2020.       [facility] Adult Partial Hospitalization Program  TRACK: [facility]    NUMBER OF PARTICIPANTS: 7    Telemedicine Visit: The patient's condition can be safely assessed and treated via synchronous audio and visual telemedicine encounter.      Reason for Telemedicine Visit: Services only offered telehealth and due to COVID-19    Originating Site (Patient Location): Patient's home    Distant Site (Provider Location): Provider Remote Setting    Consent:  The patient/guardian has verbally consented to: the potential risks and benefits of telemedicine (video visit) versus in person care; bill my insurance or make self-payment for services provided; and responsibility for payment of non-covered services.     Mode of Communication:  Video Conference via Zoom    As the provider I attest to compliance with applicable laws and regulations related to telemedicine.            Data:    Session content: At the start of this group, patients were invited to check in by identifying themselves, describing their current emotional status, and identifying issues to address in this group.   Area(s) of treatment focus addressed in this session included Symptom Management, Personal Safety and Community Resources/Discharge Planning.    Patient reported feeling indifferent. Patient discussed working toward getting out of a funk. Patient identified challenging negative self-talk, meditation and reading as  skills they will use to address their goal(s). Patient reported random thoughts may be a barrier to working toward their goal(s) and/or addressing mental health symptoms. Patient reported no safety concerns and/or self-injurious behaviors. Patient reported no substance use. Patient reported they are taking their medications as prescribed. Patient reported feeling proud that they have their health. Patient discussed using an application on her phone for meditation with the treatment group.     Therapeutic Interventions/Treatment Strategies:  Psychotherapist offered support, feedback and validation and reinforced use of skills. Treatment modalities used include Motivational Interviewing and Cognitive Behavioral Therapy. Interventions include Coping Skills: Assisted patient in identifying 1-2 healthy distraction skills to reduce overall distress, Symptoms Management: Promoted understanding of their diagnoses and how it impacts their functioning and Emotions Management:  Discussed barriers to emotional regulation.    Assessment:    Patient response:   Patient responded to session by accepting feedback, giving feedback, listening, focusing on goals, being attentive and accepting support    Possible barriers to participation / learning include: and no barriers identified    Health Issues:   None reported       Substance Use Review:   Substance Use: Last use: not using marijuana while in programming    Mental Status/Behavioral Observations  Appearance:   Appropriate   Eye Contact:   Good   Psychomotor Behavior: Normal   Attitude:   Cooperative   Orientation:   All  Speech   Rate / Production: Normal/ Responsive Normal    Volume:  Normal   Mood:    Anxious  Depressed  Irritable  Normal  Affect:    Appropriate   Thought Content:   Clear and Safety denies any current safety concerns including suicidal ideation, self-harm, and homicidal ideation  Thought Form:  Coherent  Logical     Insight:    Good     Plan:     Safety Plan:  No current safety concerns identified.  Recommended that patient call 911 or go to the local ED should there be a change in any of these risk factors.     Barriers to treatment: None identified    Patient Contracts (see media tab):  None    Substance Use: Provided encouragement towards sobriety     Continue or Discharge: Patient will continue in Adult Partial Hospitalization Program (PHP)  as planned. Patient is likely to benefit from learning and using skills as they work toward the goals identified in their treatment plan.      Zuri Armando, [facility]  March 15, 2021

## 2021-03-15 NOTE — GROUP NOTE
Psychotherapy Group Note    PATIENT'S NAME: Mariusz Huntley  MRN:   [id number]  :   1985  ACCT. NUMBER: [id number]  DATE OF SERVICE: 3/15/21  START TIME:  2:00 PM  END TIME:  2:50 PM  FACILITATOR: Zuri Armando LPCC  TOPIC: MH EBP Group: Symptom Awareness  [facility] Adult Partial Hospitalization Program  TRACK: [facility]    NUMBER OF PARTICIPANTS: 7    Summary of Group / Topics Discussed:  Symptom Awareness: Mood Disorders: Patients received a general overview of mood disorders including depressive disorders, anxiety disorders, and bipolar disorders and how it relates to their current symptoms. The purpose is to promote understanding of their diagnoses and how it impacts their functioning. Patients reviewed their current awareness of symptoms and diagnoses. Patients received information regarding diagnoses, etiology, cultural, and environmental factors as well as impact on functioning.     Patient Session Goals / Objectives:    Discussed patient individual symptoms and experiences    Reviewed diagnostic criteria and etiology of diagnoses     Telemedicine Visit: The patient's condition can be safely assessed and treated via synchronous audio and visual telemedicine encounter.      Reason for Telemedicine Visit: Services only offered telehealth and due to COVID-19    Originating Site (Patient Location): Patient's home    Distant Site (Provider Location): Provider Remote Setting    Consent:  The patient/guardian has verbally consented to: the potential risks and benefits of telemedicine (video visit) versus in person care; bill my insurance or make self-payment for services provided; and responsibility for payment of non-covered services.     Mode of Communication:  Video Conference via Zoom    As the provider I attest to compliance with applicable laws and regulations related to telemedicine.        Patient Participation / Response:  Fully participated with the group by sharing personal  reflections / insights and openly received / provided feedback with other participants.    Demonstrated understanding of topics discussed through group discussion and participation, Demonstrated understanding of how information regarding symptoms can assist in management of symptoms and Identified / Expressed personal readiness to increase awareness of symptoms and apply skills as necessary    Treatment Plan:  Patient has an initial individualized treatment plan that was created as part of their diagnostic assessment / admission process.  A master individualized treatment plan is in the process of being developed with the patient and multi-disciplinary care team.    Zuri Armando, [facility]

## 2021-03-15 NOTE — GROUP NOTE
Psychoeducation Group Note    PATIENT'S NAME: Mariusz Huntley  MRN:   [id number]  :   1985  ACCT. NUMBER: [id number]  DATE OF SERVICE: 3/15/21  START TIME:  9:00 AM  END TIME:  9:50 AM  FACILITATOR: Tomeka Teixeira RN  TOPIC: MARIA ESTHER RN Group: Medication Education and Management  [facility] Adult Partial Hospitalization Program  TRACK: 1    NUMBER OF PARTICIPANTS: 7    Summary of Group / Topics Discussed:  Medication Educations and Management:  Medication Jeopardy: Patients provided education regarding medication safety, antidepressants, side effects, neuroleptics, expected medication outcomes, knowledge of diagnosis, symptoms, and symptom management through an engaging jeopardy-style format.     Patient Session Goals / Objectives:    ? Participated in team-based Jeopardy game  ? Identified strategies for safe use, handling, and disposal of medications  ? Discussed basic aspects of medication safety, side effects, adverse outcomes and contraindications  Telemedicine Visit: The patient's condition can be safely assessed and treated via synchronous audio and visual telemedicine encounter.      Reason for Telemedicine Visit: Services only offered telehealth    Originating Site (Patient Location): Patient's home    Distant Site (Provider Location): Provider Remote Setting    Consent:  The patient/guardian has verbally consented to: the potential risks and benefits of telemedicine (video visit) versus in person care; bill my insurance or make self-payment for services provided; and responsibility for payment of non-covered services.     Mode of Communication:  Video Conference via [employer]    As the provider I attest to compliance with applicable laws and regulations related to telemedicine.       Patient Participation / Response:  Fully participated with the group by sharing personal reflections / insights and openly received / provided feedback with other participants.     Demonstrated  understanding of topics discussed through group discussion and participation    Treatment Plan:  Patient has a current master individualized treatment plan.  See Epic treatment plan for more information.    Tomeka Teixeira RN

## 2021-03-15 NOTE — GROUP NOTE
Psychoeducation Group Note    PATIENT'S NAME: Mariusz Huntley  MRN:   [id number]  :   1985  ACCT. NUMBER: [id number]  DATE OF SERVICE: 3/15/21  START TIME: 11:00 AM  END TIME: 11:50 AM  FACILITATOR: Raman Guerra OT  TOPIC: [facility] OT Group: Partial Hospitalization Program- Occupational Therapy  [facility] Adult Partial Hospitalization Program  TRACK: 1    Telemedicine Visit: The patient's condition can be safely assessed and treated via synchronous audio and visual telemedicine encounter.      Reason for Telemedicine Visit: Services only offered telehealth and Covid 19    Originating Site (Patient Location): Patient's home    Distant Site (Provider Location): [facility]: [facility].    Consent:  The patient/guardian has verbally consented to: the potential risks and benefits of telemedicine (video visit) versus in person care; bill my insurance or make self-payment for services provided; and responsibility for payment of non-covered services.     Mode of Communication:  Video Conference via Medical Zoom    As the provider I attest to compliance with applicable laws and regulations related to telemedicine.      NUMBER OF PARTICIPANTS: 7    Summary of Group / Topics Discussed:  Resiliency Development: Honoring Energy. Group members were provided with a skilled Occupational Therapy group intervention process focused on exploring strategies and individualized adaptations for managing their energy to support improved mental wellbeing. They were provided with psychoeducation on and explored the concept of energy management through the lens of self-compassion by embracing the complementary energies of Yin & Yang (leaning into/yielding to, doing/being, sympathetic/parasympathetic, etc) to embrace a rhythm of recovery (Ron 2019). They worked to improve self-awareness and develop a sense of agency through  "identification of specific meaningful and purposeful activities based on their own contextual energy level. Explored \"Spoons theory\" and the bank account approach. Identified ways to honor their energy at specific times of the day. Facilitation of reflection, validation, and support provided.          Patient Session Goals / Objectives:     Learn and develop understanding of ways to manage energy that embraces self-compassion.     Develop an understanding and practice of energy management through complementary energies to develop resiliency.     Deepen self-awareness about their own daily energy rhythms and apply taught concepts to their own plan for energy management strategies as they begin to re-engage in meaningful purposeful activities.     Identify one way to integrate content into their daily life to support recovery by identifying and honoring their energy level.                Patient Participation / Response:  Fully participated with the group by sharing personal reflections / insights and openly received / provided feedback with other participants.    Verbalized understanding of content and Patient would benefit from additional opportunities to practice the content to be able to generalize it to their everyday life with increased intentionality, consistency, and efficacy in support of their psychiatric recovery    Treatment Plan:  Patient has an initial individualized treatment plan that was created as part of their diagnostic assessment / admission process.  A master individualized treatment plan is in the process of being developed with the patient and multi-disciplinary care team.    Raman Guerra, OT  "

## 2021-03-16 ENCOUNTER — HOSPITAL ENCOUNTER (OUTPATIENT)
Dept: BEHAVIORAL HEALTH | Facility: CLINIC | Age: 36
End: 2021-03-16
Attending: PSYCHIATRY & NEUROLOGY
Payer: COMMERCIAL

## 2021-03-16 DIAGNOSIS — F33.1 MAJOR DEPRESSIVE DISORDER, RECURRENT EPISODE, MODERATE (H): ICD-10-CM

## 2021-03-16 DIAGNOSIS — F43.10 PTSD (POST-TRAUMATIC STRESS DISORDER): ICD-10-CM

## 2021-03-16 PROCEDURE — H0035 MH PARTIAL HOSP TX UNDER 24H: HCPCS | Mod: GT

## 2021-03-16 PROCEDURE — H0035 MH PARTIAL HOSP TX UNDER 24H: HCPCS | Mod: 95 | Performed by: COUNSELOR

## 2021-03-16 PROCEDURE — H0035 MH PARTIAL HOSP TX UNDER 24H: HCPCS | Mod: GT | Performed by: COUNSELOR

## 2021-03-16 PROCEDURE — H0035 MH PARTIAL HOSP TX UNDER 24H: HCPCS | Mod: 95

## 2021-03-16 NOTE — GROUP NOTE
Psychoeducation Group Note    PATIENT'S NAME: Mariusz Huntley  MRN:   [id number]  :   1985  ACCT. NUMBER: [id number]  DATE OF SERVICE: 3/16/21  START TIME:  2:00 PM  END TIME:  2:50 PM  FACILITATOR: Tomeka Teixeira RN  TOPIC: MH RN Group: [facility] Adult Partial Hospitalization Program  TRACK: 1    NUMBER OF PARTICIPANTS: 7    Summary of Group / Topics Discussed:  Foundations of Health: Sleep: Pathophysiology of sleep disorders: The anatomy of sleep was reviewed including structures, stages, mechanisms, and the purpose that sleep has on the brain. Sleep disorders were discussed within the group with a focus on gaining knowledge about the etiology and pathophysiology of insomnia, sleep apnea, restless leg syndrome, narcolepsy, medications that can cause risk for sleeping disorders, and other symptoms/factors that may interfere with sleep. Risk factors for developing sleep disorders were discussed and treatments/pharmacological options were explored.     Patient Session Goals / Objectives:  ? Described the effect and purpose of sleep on the brain and identify the amount of sleep needed  ? Identified differences between sleep disorders and risks associated with sleep disorders  ? Described the connection between sleep disturbances and mental illness    Increased knowledge about treatments for sleep disorders  Telemedicine Visit: The patient's condition can be safely assessed and treated via synchronous audio and visual telemedicine encounter.      Reason for Telemedicine Visit: Services only offered telehealth    Originating Site (Patient Location): Patient's home    Distant Site (Provider Location): Provider Remote Setting    Consent:  The patient/guardian has verbally consented to: the potential risks and benefits of telemedicine (video visit) versus in person care; bill my insurance or make self-payment for services provided; and responsibility for payment of  non-covered services.     Mode of Communication:  Video Conference via [employer]    As the provider I attest to compliance with applicable laws and regulations related to telemedicine.       Patient Participation / Response:  Moderately participated, sharing some personal reflections / insights and adequately adequately received / provided feedback with other participants.    Demonstrated understanding of topics discussed through group discussion and participation    Treatment Plan:  Patient has a current master individualized treatment plan.  See Epic treatment plan for more information.    Tomeka Teixeira RN

## 2021-03-16 NOTE — GROUP NOTE
Psychotherapy Group Note    PATIENT'S NAME: Mariusz Huntley  MRN:   [id number]  :   1985  ACCT. NUMBER: [id number]  DATE OF SERVICE: 3/16/21  START TIME:  4:00 PM  END TIME:  6:00 PM  FACILITATOR: Zuri Armando LPCC  TOPIC: MH EBP Group: Specialty Awareness  [facility] Adult Partial Hospitalization Program  TRACK: [facility]    NUMBER OF PARTICIPANTS: 7    Summary of Group / Topics Discussed:  Specialty Topics: Education Support Network: Patients worked on identifying the mild, moderate, and severe symptoms of their disorder.  Patients identified helpful supportive statements and actions they would appreciate from caregivers.  The goal is to gather information in preparation for the education support network for problem solving and planning for support with caregivers.    Education Support Network:  Patients and caregivers received an overview of diagnoses including physical, intellectual, and behavioral indicators of illness. Patients presented information created in the support network development group to engage caregivers in planning for help and support to manage mental health symptoms.  The goal is to help patients and caregivers create a plan for support and assistance after discharge.      Patient Session Goals / Objectives:    Understanding diagnoses and accompanying physical reactions, thoughts, and behaviors.      Explored options to support patients in their recovery     Formulated a plan with caregivers for assistance and support to aid in recovery     Problem solved barriers to follow through on plan  Telemedicine Visit: The patient's condition can be safely assessed and treated via synchronous audio and visual telemedicine encounter.      Reason for Telemedicine Visit: Services only offered telehealth and due to COVID-19    Originating Site (Patient Location): Patient's home    Distant Site (Provider Location): Provider Remote Setting    Consent:  The patient/guardian has  verbally consented to: the potential risks and benefits of telemedicine (video visit) versus in person care; bill my insurance or make self-payment for services provided; and responsibility for payment of non-covered services.     Mode of Communication:  Video Conference via Zoom    As the provider I attest to compliance with applicable laws and regulations related to telemedicine.        Patient Participation / Response:  Fully participated with the group by sharing personal reflections / insights and openly received / provided feedback with other participants.    Patient's  and mother attended with her.     Demonstrated understanding of topics discussed through group discussion and participation, Identified / Expressed readiness to act on skill suggestions discussed in topic and Verbalized understanding of ways to proactively manage illness    Treatment Plan:  Patient has a current master individualized treatment plan.  See Epic treatment plan for more information.    Zuri Armando, [facility]

## 2021-03-16 NOTE — GROUP NOTE
Process Group Note    PATIENT'S NAME: Mariusz Huntley  MRN:   [id number]  :   1985  ACCT. NUMBER: [id number]  DATE OF SERVICE: 3/16/21  START TIME:  1:00 PM  END TIME:  1:50 PM  FACILITATOR: Zuri Armando LPCC  TOPIC:  Process Group    Diagnoses:  Axis I:     1.  Major depressive disorder, recurrent.     2.  Generalized anxiety disorder.     3.  Posttraumatic stress disorder.   4.  Cannabis use disorder.     5.  Alcohol use disorder, in remission.   Axis II:  Deferred.   Axis III:  History of concussion 2020.       [facility] Adult Partial Hospitalization Program  TRACK: [facility]    NUMBER OF PARTICIPANTS: 5    Telemedicine Visit: The patient's condition can be safely assessed and treated via synchronous audio and visual telemedicine encounter.      Reason for Telemedicine Visit: Services only offered telehealth and due to COVID-19    Originating Site (Patient Location): Patient's home    Distant Site (Provider Location): Provider Remote Setting    Consent:  The patient/guardian has verbally consented to: the potential risks and benefits of telemedicine (video visit) versus in person care; bill my insurance or make self-payment for services provided; and responsibility for payment of non-covered services.     Mode of Communication:  Video Conference via Zoom    As the provider I attest to compliance with applicable laws and regulations related to telemedicine.            Data:    Session content: At the start of this group, patients were invited to check in by identifying themselves, describing their current emotional status, and identifying issues to address in this group.   Area(s) of treatment focus addressed in this session included Symptom Management, Personal Safety and Community Resources/Discharge Planning.    Patient reported feeling anxious and tired. Patient discussed working toward decreasing anxiety. Patient identified her journal as skills they will use to address their  goal(s). Patient reported negative self-talk may be a barrier to working toward their goal(s) and/or addressing mental health symptoms. Patient reported no safety concerns and/or self-injurious behaviors. Patient reported no substance use. Patient reported they are taking their medications as prescribed. Patient reported feeling proud that they used their journal today. Patient discussed a re-occurring dream she had last night with the treatment group.     Therapeutic Interventions/Treatment Strategies:  Psychotherapist offered support, feedback and validation and reinforced use of skills. Treatment modalities used include Motivational Interviewing and Cognitive Behavioral Therapy. Interventions include Coping Skills: Assisted patient in identifying 1-2 healthy distraction skills to reduce overall distress, Symptoms Management: Promoted understanding of their diagnoses and how it impacts their functioning and Emotions Management:  Discussed barriers to emotional regulation.    Assessment:    Patient response:   Patient responded to session by accepting feedback, giving feedback, listening, focusing on goals, being attentive and accepting support    Possible barriers to participation / learning include: and no barriers identified    Health Issues:   None reported       Substance Use Review:   Substance Use: No active concerns identified.    Mental Status/Behavioral Observations  Appearance:   Appropriate   Eye Contact:   Good   Psychomotor Behavior: Normal   Attitude:   Cooperative   Orientation:   All  Speech   Rate / Production: Normal/ Responsive Normal    Volume:  Normal   Mood:    Anxious  Depressed  Normal  Affect:    Appropriate   Thought Content:   Clear and Safety denies any current safety concerns including suicidal ideation, self-harm, and homicidal ideation  Thought Form:  Coherent  Logical     Insight:    Good     Plan:     Safety Plan: No current safety concerns identified.  Recommended that patient  call 911 or go to the local ED should there be a change in any of these risk factors.     Barriers to treatment: None identified    Patient Contracts (see media tab):  None    Substance Use: Provided encouragement towards sobriety     Continue or Discharge: Patient will continue in Adult Partial Hospitalization Program (PHP)  as planned. Patient is likely to benefit from learning and using skills as they work toward the goals identified in their treatment plan.      Zuri Armando, [facility]  March 16, 2021

## 2021-03-17 ENCOUNTER — HOSPITAL ENCOUNTER (OUTPATIENT)
Dept: BEHAVIORAL HEALTH | Facility: CLINIC | Age: 36
End: 2021-03-17
Attending: PSYCHIATRY & NEUROLOGY
Payer: COMMERCIAL

## 2021-03-17 DIAGNOSIS — F43.10 PTSD (POST-TRAUMATIC STRESS DISORDER): ICD-10-CM

## 2021-03-17 DIAGNOSIS — F33.1 MAJOR DEPRESSIVE DISORDER, RECURRENT EPISODE, MODERATE (H): ICD-10-CM

## 2021-03-17 PROCEDURE — H0035 MH PARTIAL HOSP TX UNDER 24H: HCPCS | Mod: 95 | Performed by: SOCIAL WORKER

## 2021-03-17 PROCEDURE — H0035 MH PARTIAL HOSP TX UNDER 24H: HCPCS | Mod: GT | Performed by: COUNSELOR

## 2021-03-17 PROCEDURE — H0035 MH PARTIAL HOSP TX UNDER 24H: HCPCS | Mod: 95 | Performed by: OCCUPATIONAL THERAPIST

## 2021-03-17 PROCEDURE — H0035 MH PARTIAL HOSP TX UNDER 24H: HCPCS | Mod: 95

## 2021-03-17 PROCEDURE — H0035 MH PARTIAL HOSP TX UNDER 24H: HCPCS | Mod: GT

## 2021-03-17 NOTE — GROUP NOTE
Psychoeducation Group Note    PATIENT'S NAME: Mariusz Huntley  MRN:   [id number]  :   1985  ACCT. NUMBER: [id number]  DATE OF SERVICE: 3/17/21  START TIME: 11:00 AM  END TIME: 11:50 AM  FACILITATOR: Sheila Henderson OTR/L  TOPIC: MH PHP OT Group: Self- Regulation Skills  [facility] Adult Partial Hospitalization Program  TRACK: 1    NUMBER OF PARTICIPANTS: 6    Telemedicine Visit: The patient's condition can be safely assessed and treated via synchronous audio and visual telemedicine encounter.      Reason for Telemedicine Visit: Services only offered telehealth    Originating Site (Patient Location): Patient's home    Distant Site (Provider Location): [facility]    Consent:  The patient/guardian has verbally consented to: the potential risks and benefits of telemedicine (video visit) versus in person care; bill my insurance or make self-payment for services provided; and responsibility for payment of non-covered services.     Mode of Communication:  Video Conference via Zoom    As the provider I attest to compliance with applicable laws and regulations related to telemedicine.     Summary of Group / Topics Discussed:  Self-Regulation Skills: Coping Through the Senses Introduction Part 1: Patients were introduced and taught about neurosensory based skills and strategies related to supporting effective self regulation skills.  Patients were taught about the external sensory systems (taste, smell, vision, auditory and light touch) and how they can be used for coping with mental health symptoms and stressors.  Patients were provided with an opportunity to increase self-awareness of helpful sensory input and self care activities. Patients were introduced on how to create supportive environments that encourage use of these skills.    Patient Session Goals / Objectives:    Review/learn the external sensory systems and how they relate to  self-regulation    Identified specific and individualized neurosensory skills to help when distressed      Identified skills learned and how this applies to current daily life    Established a plan for practice of these skills in their own environments      Patient Participation / Response:  Fully participated with the group by sharing personal reflections / insights and openly received / provided feedback with other participants.    Patient presentation: constricted affect; active in group discussion sharing examples and ideas with the group, Verbalized understanding of content and Patient would benefit from additional opportunities to practice the content to be able to generalize it to their everyday life with increased intentionality, consistency, and efficacy in support of their psychiatric recovery    Treatment Plan:  Patient has a current master individualized treatment plan.  See Epic treatment plan for more information.    Sheila Henderson, OTR/L

## 2021-03-17 NOTE — GROUP NOTE
Psychoeducation Group Note    PATIENT'S NAME: Mariusz Huntley  MRN:   [id number]  :   1985  ACCT. NUMBER: [id number]  DATE OF SERVICE: 3/17/21  START TIME:  9:00 AM  END TIME:  9:50 AM  FACILITATOR: Tomeka Teixeira RN  TOPIC: MH RN Group: Brain Health  [facility] Adult Partial Hospitalization Program  TRACK: 1    NUMBER OF PARTICIPANTS: 7    Summary of Group / Topics Discussed:  Brain Health:  Pathophysiology of stress and anxiety: Patients were educated on the difference between stress, chronic stress, and anxiety. The anatomy and pathophysiology of the body/brain were reviewed to illustrate the immediate effects of stress/anxiety in the body and the long term effects and increased risks for chronic disease that come from unmanaged stress/anxiety. Self-coping strategies to manage symptoms of stress were reviewed and pharmacologic, psychotherapeutic, and complementary treatment options were discussed.    Patient Session Goals / Objectives:  ? Described the differences between stress and anxiety and how the body responds to it  ? Listed the long term effects and increased risks for chronic disease that can arise from unmanaged stress/anxiety  ? Identified and described pharmacologic, psychotherapeutic, and complementary treatment options  Telemedicine Visit: The patient's condition can be safely assessed and treated via synchronous audio and visual telemedicine encounter.      Reason for Telemedicine Visit: Services only offered telehealth    Originating Site (Patient Location): Patient's home    Distant Site (Provider Location): Provider Remote Setting    Consent:  The patient/guardian has verbally consented to: the potential risks and benefits of telemedicine (video visit) versus in person care; bill my insurance or make self-payment for services provided; and responsibility for payment of non-covered services.     Mode of Communication:  Video Conference via [employer] group    As the provider  I attest to compliance with applicable laws and regulations related to telemedicine.       Patient Participation / Response:  Moderately participated, sharing some personal reflections / insights and adequately adequately received / provided feedback with other participants.    Identified / Expressed personal readiness to practice skills    Treatment Plan:  Patient has a current master individualized treatment plan.  See Epic treatment plan for more information.    Tomeka Teixeira RN

## 2021-03-17 NOTE — PROGRESS NOTES
Acknowledgement of Current Treatment Plan       I have reviewed my treatment plan with my therapist / counselor on 3/17/21.   I agree with the plan as it is written in the electronic health record.    Name:      Signature:  Mariusz Huntley Unable to sign due to COVID-19     Dr. Archie Villa MD  Psychiatrist    Karine Mallory, MS, [facility], BC-DMT  Psychotherapist     Zuri Armando MA, [facility]  Psychotherapist  ALEJANDRO Mercer on 3/17/2021 at 12:24 PM

## 2021-03-17 NOTE — GROUP NOTE
Psychoeducation Group Note    PATIENT'S NAME: Mariusz Huntley  MRN:   [id number]  :   1985  ACCT. NUMBER: [id number]  DATE OF SERVICE: 3/16/21  START TIME:  3:00 PM  END TIME:  3:50 PM  FACILITATOR: Raman Guerra OT  TOPIC: [facility] OT Group: Self- Regulation Skills  [facility] Adult Partial Hospitalization Program  TRACK: 1    Telemedicine Visit: The patient's condition can be safely assessed and treated via synchronous audio and visual telemedicine encounter.      Reason for Telemedicine Visit: Services only offered telehealth and Covid 19    Originating Site (Patient Location): Patient's home    Distant Site (Provider Location): [facility]: [facility]    Consent:  The patient/guardian has verbally consented to: the potential risks and benefits of telemedicine (video visit) versus in person care; bill my insurance or make self-payment for services provided; and responsibility for payment of non-covered services.     Mode of Communication:  Video Conference via [employer]    As the provider I attest to compliance with applicable laws and regulations related to telemedicine.      NUMBER OF PARTICIPANTS: 7    Summary of Group / Topics Discussed:  Self-Regulation Skills: Sensory Enhanced Mindfulness: Patients were provided with written and verbal psychoeducation on the concept of integrating mindfulness with a bottom up, sensory rich, experiential intervention activities to develop self-awareness and skills for self-regulation.   Patients worked to increase knowledge, self awareness, and skills during a guided skilled structured sensory enhanced activity: Breathwork and mindful movement: active meditation practice to build resiliency and self-efficacy. Facilitation of reflection to reinforce taught concepts was provided.     Patient Session Goals / Objectives:    Identified how using sensory enhanced mindfulness  practices can be used for grounding, stress management, and self-regulation      Improved awareness of different types of sensory enhanced mindfulness activities that assist with healthy coping of stress and symptoms      Established a plan for practice of these skills in their own environments    Practiced and reflected on how to generalize taught skills to their everyday life        Patient Participation / Response:  Fully participated with the group by sharing personal reflections / insights and openly received / provided feedback with other participants.    Verbalized understanding of content and Patient would benefit from additional opportunities to practice the content to be able to generalize it to their everyday life with increased intentionality, consistency, and efficacy in support of their psychiatric recovery    Treatment Plan:  Patient has an initial individualized treatment plan that was created as part of their diagnostic assessment / admission process.  A master individualized treatment plan is in the process of being developed with the patient and multi-disciplinary care team.    Raman Guerra, OT

## 2021-03-17 NOTE — GROUP NOTE
"Process Group Note    PATIENT'S NAME: Mariusz Huntley  MRN:   [id number]  :   1985  ACCT. NUMBER: [id number]  DATE OF SERVICE: 3/17/21  START TIME: 10:00 AM  END TIME: 10:50 AM  FACILITATOR: Zuri Armando LPCC  TOPIC:  Process Group    Diagnoses:  Axis I:     1.  Major depressive disorder, recurrent.     2.  Generalized anxiety disorder.     3.  Posttraumatic stress disorder.   4.  Cannabis use disorder.     5.  Alcohol use disorder, in remission.   Axis II:  Deferred.   Axis III:  History of concussion 2020.       [facility] Adult Partial Hospitalization Program  TRACK: [facility]    NUMBER OF PARTICIPANTS: 7    Telemedicine Visit: The patient's condition can be safely assessed and treated via synchronous audio and visual telemedicine encounter.      Reason for Telemedicine Visit: Services only offered telehealth and due to COVID-19    Originating Site (Patient Location): Patient's home    Distant Site (Provider Location): Provider Remote Setting    Consent:  The patient/guardian has verbally consented to: the potential risks and benefits of telemedicine (video visit) versus in person care; bill my insurance or make self-payment for services provided; and responsibility for payment of non-covered services.     Mode of Communication:  Video Conference via Zoom    As the provider I attest to compliance with applicable laws and regulations related to telemedicine.            Data:    Session content: At the start of this group, patients were invited to check in by identifying themselves, describing their current emotional status, and identifying issues to address in this group.   Area(s) of treatment focus addressed in this session included Symptom Management, Personal Safety and Community Resources/Discharge Planning.    Patient reported feeling sad and \"triggered\" by a movie she watched. Patient discussed working toward making those feelings go away. Patient identified researching the vagus " nerve as skills they will use to address their goal(s). Patient reported her mind may be a barrier to working toward their goal(s) and/or addressing mental health symptoms. Patient reported no safety concerns and/or self-injurious behaviors. Patient reported no substance use. Patient reported they are taking their medications as prescribed. Patient reported feeling proud that they made their bed. Patient discussed the support network session with the treatment group.     Therapeutic Interventions/Treatment Strategies:  Psychotherapist offered support, feedback and validation and reinforced use of skills. Treatment modalities used include Motivational Interviewing and Cognitive Behavioral Therapy. Interventions include Coping Skills: Assisted patient in identifying 1-2 healthy distraction skills to reduce overall distress, Symptoms Management: Promoted understanding of their diagnoses and how it impacts their functioning and Emotions Management:  Discussed barriers to emotional regulation.    Assessment:    Patient response:   Patient responded to session by accepting feedback, giving feedback, listening, focusing on goals, being attentive and accepting support    Possible barriers to participation / learning include: and no barriers identified    Health Issues:   None reported       Substance Use Review:   Substance Use: No active concerns identified.    Mental Status/Behavioral Observations  Appearance:   Appropriate   Eye Contact:   Good   Psychomotor Behavior: Normal   Attitude:   Cooperative   Orientation:   All  Speech   Rate / Production: Normal/ Responsive Normal    Volume:  Normal   Mood:    Anxious  Depressed  Normal  Affect:    Appropriate   Thought Content:   Clear and Safety denies any current safety concerns including suicidal ideation, self-harm, and homicidal ideation  Thought Form:  Coherent  Logical     Insight:    Good     Plan:     Safety Plan: No current safety concerns identified.  Recommended  that patient call 911 or go to the local ED should there be a change in any of these risk factors.     Barriers to treatment: None identified    Patient Contracts (see media tab):  None    Substance Use: Provided encouragement towards sobriety     Continue or Discharge: Patient will continue in Adult Partial Hospitalization Program (PHP)  as planned. Patient is likely to benefit from learning and using skills as they work toward the goals identified in their treatment plan.      Zuri Armando, [facility]  March 17, 2021

## 2021-03-17 NOTE — PROGRESS NOTES
[facility]   Dr. Villa's Psychiatric Progress Note  2021      Patient:  Mariusz Huntley   Medical Record Number:  [id number]  :  1985    Telemedicine Visit: The patient's condition can be safely assessed and treated via synchronous audio and visual telemedicine encounter.       Reason for Telemedicine Visit: COVID-19 lockdown     Originating Site (Patient Location):  HOME     Distant Site (Provider Location): [facility]: [place]     Consent:  The patient/guardian has verbally consented to: the potential risks and benefits of telemedicine (video visit) versus in person care; bill my insurance or make self-payment for services provided; and responsibility for payment of non-covered services.        Interim History:   The patient's care was discussed with the treatment team and chart notes were reviewed.    3/15/2:  VPA was started on 3/12/21.  Weekend was ok.  She was down most of the weekend, even though she had her kids.  Emotionally rough weekend.      3/17/21:  Pt has been very anxious.  Depression is 50% as bad as anxiety.      Psychiatric ROS:  Mood: anxious depressed  Sleep:  Poor;  uses Trazodone 100mg and Melatonin;  Tossed and turned  Appetite:  better  Eating: better, eating more  Energy Level:  Ok, better than yesterday  Concentration/Memory Problems:  better;  journaling a lot more  Suicidal Thoughts:No  Homicidal Thoughts:No  Psychotic Symptoms: No  Medication Side Effects:No  Medication Compliance:Yes   Physical Complaints:  none         Medications:     PAST PSYCH MEDS:  Depakote ER, Wellbutrin, Zoloft, Lexapro, Neurontin, Vistaril, trazodone and Ritalin.    Current Outpatient Medications   Medication Sig     acetaminophen (TYLENOL) 325 MG tablet Take 2 tablets (650 mg) by mouth every 4 hours as needed for mild pain (to moderate pain)                 divalproex sodium extended-release (DEPAKOTE ER) 250 MG 24 hr tablet Take 1 po at  bedtime x 2 days then 2 po at bedtime x 2 days then 3 po at bedtime x 2 days then 4 po qHS     escitalopram (LEXAPRO) 10 MG tablet Take 1 tablet (10 mg) by mouth daily     gabapentin (NEURONTIN) 600 MG tablet Take 800 mg by mouth 3 times daily            hydrOXYzine (ATARAX) 50 MG tablet 100mg bid     ibuprofen (ADVIL/MOTRIN) 600 MG tablet Take 1 tablet (600 mg) by mouth every 6 hours as needed for moderate pain     LORazepam (ATIVAN) 2 MG tablet Take 2 mg by mouth every 6 hours as needed for anxiety Reports 10 mg ??     melatonin 3 MG tablet Take 1-2 tablets (3-6 mg) by mouth nightly as needed for sleep     methyl salicylate-menthol (TONY-OCAMPO) OINT ointment Apply 1 g topically every 4 hours as needed (pain, apply to back)     Multiple Vitamins-Minerals (MULTIVITAMIN GUMMIES ADULT PO) Take 4 tablets by mouth daily                 propranolol (INDERAL) 40 MG tablet Take 1 tablet (40 mg) by mouth 2 times daily     senna-docusate (SENOKOT-S/PERICOLACE) 8.6-50 MG tablet Take 1 tablet by mouth 2 times daily as needed for constipation     traZODone (DESYREL) 100 MG tablet Take 150 mg to 200mg by mouth At Bedtime           Vitamin D3 (CHOLECALCIFEROL) 25 mcg (1000 units) tablet Take 2 tablets (50 mcg) by mouth daily     No current facility-administered medications for this encounter.              Allergies:     Allergies   Allergen Reactions     Honeybees [Bees] Anaphylaxis     Sulfamethoxazole-Trimethoprim Nausea and Vomiting     Codeine      Sulfa Drugs      Amoxicillin Rash     Codeine Rash            Psychiatric Examination:   [facility] 02/20/2021 (Exact Date)   Weight is 0 lbs 0 oz  There is no height or weight on file to calculate BMI.    Appearance:  awake, alert and adequately groomed  Attitude:  cooperative  Eye Contact:  good  Mood:  Anxious and depressed  Affect:  mood congruent  Speech:  clear, coherent  Psychomotor Behavior:  no evidence of tardive dyskinesia, dystonia, or tics  Throught Process:   logical  Associations:  no loose associations  Thought Content:  no evidence of suicidal ideation or homicidal ideation and no evidence of psychotic thought  Insight:  good  Judgement:  intact  Oriented to:  time, person, and place  Attention Span and Concentration:  intact  Recent and Remote Memory:  intact  Gait:Normal    Risk/Potential for Dangerousness:  Multiple Active Diagnoses:HIGH  Self Care:HIGH  Suicide:LOW  Assault:LOW  Self Injurious Behaviors:LOW  Inappropriate Sexual Behavior:LOW         Labs:   No results found for this or any previous visit (from the past 24 hour(s)).     No results found for this or any previous visit (from the past 1008 hour(s)).      Impression:   This is a 35 year old female continues PHP for mood stabilization.  Mood is anxious and depressed.  Groups are going well.           DIagnoses:     Axis I:     1.  Major depressive disorder, recurrent.     2.  Generalized anxiety disorder.     3.  Posttraumatic stress disorder.   4.  Cannabis use disorder.     5.  Alcohol use disorder, in remission.   Axis II:  Deferred.   Axis III:  History of concussion 02/2020.            Plan:     Continue [facility] [facility] Program.   Started Depakote  mg at bedtime for 2 days, then increasing to 5 mg at bedtime for 2 days, then increasing to 750 mg at bedtime for 2 days, then increasing to 1000 mg at bedtime.   Will order labs once she reaches steady state.  Increase Trazodone from 100mg to 150 or 200mg at bedtime prn.   Will likely taper off some of the medications which she feels are not helping.   Expect stabilization and completion of Partial Hospital Program.   Follow up with current outpatient medication prescriber and therapist.     Archie Villa MD

## 2021-03-17 NOTE — GROUP NOTE
Psychoeducation Group Note    PATIENT'S NAME: Mariusz Huntley  MRN:   [id number]  :   1985  ACCT. NUMBER: [id number]  DATE OF SERVICE: 3/17/21  START TIME:  1:00 PM  END TIME:  1:50 PM  FACILITATOR: Raman Guerra OT  TOPIC: [facility] OT Group: Self- Regulation Skills  [facility] Adult Partial Hospitalization Program  TRACK: 1    Telemedicine Visit: The patient's condition can be safely assessed and treated via synchronous audio and visual telemedicine encounter.      Reason for Telemedicine Visit: Services only offered telehealth and covid 19    Originating Site (Patient Location): Patient's home    Distant Site (Provider Location): [facility]: [facility]    Consent:  The patient/guardian has verbally consented to: the potential risks and benefits of telemedicine (video visit) versus in person care; bill my insurance or make self-payment for services provided; and responsibility for payment of non-covered services.     Mode of Communication:  Video Conference via Dispop    As the provider I attest to compliance with applicable laws and regulations related to telemedicine.      NUMBER OF PARTICIPANTS: 6    Summary of Group / Topics Discussed:  Self-Regulation Skills: Coping Through the Senses: Internal senes: Patients were introduced and taught about neurosensory based skills and strategies related to supporting effective self regulation skills.  Patients were taught about the 3 internal sensory systems (proprioception, vestibular, and deep pressure) and how they can be used for coping with mental health symptoms and stressors.  Patients were provided with an experiential opportunity to increase self-awareness of helpful sensory input and self care activities. Patients were introduced on how to create supportive environments that encourage use of these skills.    Patient Session Goals /  Objectives:    Review/learn the 3 internal sensory systems and how they relate to self-regulation    Identified specific and individualized neurosensory skills to help when distressed      Identified skills learned and how this applies to current daily life    Established a plan for practice of these skills in their own environments      Patient Participation / Response:  Fully participated with the group by sharing personal reflections / insights and openly received / provided feedback with other participants.    Verbalized understanding of content and Patient would benefit from additional opportunities to practice the content to be able to generalize it to their everyday life with increased intentionality, consistency, and efficacy in support of their psychiatric recovery     OT met with patient today to complete and OT evaluation and set OT goals to work on while in the Partial Hospitalization Program.     Treatment Plan:  Patient has a current master individualized treatment plan.  See Epic treatment plan for more information.    Raman Guerra, OT

## 2021-03-18 ENCOUNTER — HOSPITAL ENCOUNTER (OUTPATIENT)
Dept: BEHAVIORAL HEALTH | Facility: CLINIC | Age: 36
End: 2021-03-18
Attending: PSYCHIATRY & NEUROLOGY
Payer: COMMERCIAL

## 2021-03-18 DIAGNOSIS — F33.1 MAJOR DEPRESSIVE DISORDER, RECURRENT EPISODE, MODERATE (H): ICD-10-CM

## 2021-03-18 DIAGNOSIS — F43.10 PTSD (POST-TRAUMATIC STRESS DISORDER): ICD-10-CM

## 2021-03-18 PROCEDURE — H0035 MH PARTIAL HOSP TX UNDER 24H: HCPCS | Mod: GT

## 2021-03-18 PROCEDURE — H0035 MH PARTIAL HOSP TX UNDER 24H: HCPCS | Mod: 95

## 2021-03-18 PROCEDURE — H0035 MH PARTIAL HOSP TX UNDER 24H: HCPCS | Mod: 95 | Performed by: COUNSELOR

## 2021-03-18 NOTE — GROUP NOTE
Psychotherapy Group Note    PATIENT'S NAME: Mariusz Huntley  MRN:   [id number]  :   1985  ACCT. NUMBER: [id number]  DATE OF SERVICE: 3/18/21  START TIME: 10:00 AM  END TIME: 10:50 AM  FACILITATOR: Zuri Armando LPCC  TOPIC: MH EBP Group: Cognitive Restructuring  [facility] Adult Partial Hospitalization Program  TRACK: [facility]    NUMBER OF PARTICIPANTS: 5    Summary of Group / Topics Discussed:  Cognitive Restructuring: Distortions: Patients received an overview of how to identify common cognitive distortions. Patients will explore alternatives to cognitive distortions and practice challenging their negative thought patterns. The goal is to help patients target modify ineffective thought patterns.     Patient Session Goals / Objectives:    Familiarized self with ineffective / unhealthy thoughts and how they develop.      Explored impact of ineffective thoughts / distortions on mood and activity    Formulated new neutral/positive alternatives to challenge less helpful / ineffective thoughts.    Practiced and plan to apply in daily life    Telemedicine Visit: The patient's condition can be safely assessed and treated via synchronous audio and visual telemedicine encounter.      Reason for Telemedicine Visit: Services only offered telehealth and due to COVID-19    Originating Site (Patient Location): Patient's home    Distant Site (Provider Location): Provider Remote Setting    Consent:  The patient/guardian has verbally consented to: the potential risks and benefits of telemedicine (video visit) versus in person care; bill my insurance or make self-payment for services provided; and responsibility for payment of non-covered services.     Mode of Communication:  Video Conference via Zoom    As the provider I attest to compliance with applicable laws and regulations related to telemedicine.               Patient Participation / Response:  Fully participated with the group by sharing personal  reflections / insights and openly received / provided feedback with other participants.    Demonstrated understanding of topics discussed through group discussion and participation, Expressed understanding of the relationship between behaviors, thoughts, and feelings and Demonstrated knowledge of personal thought patterns and how they impact their mood and behavior.    Treatment Plan:  Patient has a current master individualized treatment plan.  See Epic treatment plan for more information.    Zuri Armando, LPCC

## 2021-03-18 NOTE — GROUP NOTE
Psychotherapy Group Note    PATIENT'S NAME: Mariusz Huntley  MRN:   [id number]  :   1985  ACCT. NUMBER: [id number]  DATE OF SERVICE: 3/18/21  START TIME:  2:00 PM  END TIME:  2:50 PM  FACILITATOR: Zuri Armando LPCC  TOPIC: MH EBP Group: Emotions Management  [facility] Adult Partial Hospitalization Program  TRACK: [facility]    NUMBER OF PARTICIPANTS: 5    Summary of Group / Topics Discussed:  Emotions Management: Opposite to Emotion: Patients discussed past and present struggles with knowing how to make changes in their lives due to difficult emotional experiences.  Explored desires to experience and feel less anger, sadness, guilt, and fear.  Reviewed the therapeutic skill of opposite action and patients explored opportunities to use their behaviors as a tool to reduce an emotion that they want to change.     Patient Session Goals / Objectives:    Review DBT concepts and focus on patient s experiences of distress and difficult emotional experiences.    Learn how to do the opposite of what an emotion makes us want to do in an effort to decrease an unwanted emotional experience.    Demonstrate understanding of the skill of opposite action by sharing experiences where the technique could be useful in past / present situations.    Telemedicine Visit: The patient's condition can be safely assessed and treated via synchronous audio and visual telemedicine encounter.      Reason for Telemedicine Visit: Services only offered telehealth and due to COVID-19    Originating Site (Patient Location): Patient's home    Distant Site (Provider Location): Provider Remote Setting    Consent:  The patient/guardian has verbally consented to: the potential risks and benefits of telemedicine (video visit) versus in person care; bill my insurance or make self-payment for services provided; and responsibility for payment of non-covered services.     Mode of Communication:  Video Conference via Zoom    As the provider I  attest to compliance with applicable laws and regulations related to telemedicine.        Patient Participation / Response:  Fully participated with the group by sharing personal reflections / insights and openly received / provided feedback with other participants.    Demonstrated understanding of topics discussed through group discussion and participation, Expressed understanding of the relevance / importance of emotions management skills at distressing times in life and Self-aware of experiences with difficult emotions, and strategies to employ to manage them    Treatment Plan:  Patient has a current master individualized treatment plan.  See Epic treatment plan for more information.    Zuri Armando, [facility]

## 2021-03-18 NOTE — GROUP NOTE
Psychoeducation Group Note    PATIENT'S NAME: Mariusz Huntley  MRN:   [id number]  :   1985  ACCT. NUMBER: [id number]  DATE OF SERVICE: 3/18/21  START TIME:  1:00 PM  END TIME:  1:50 PM  FACILITATOR: Tomeka Teixeira RN  TOPIC: MARIA ESTHER RN Group: Mind/Body Practice & Complementary  [facility] Adult Partial Hospitalization Program  TRACK: 1    NUMBER OF PARTICIPANTS: 5    Summary of Group / Topics Discussed:  Mind/Body Practice & Complementary Therapies:  Progressive Muscle Relaxation: In addition to affecting our mood and behavior, psychological stress can cause a myriad of physical symptoms in our body. Patients were educated on these effects and guided to increased self-awareness of how stress affects their body. The purpose, benefits, history, and techniques of progressive muscle relaxation were discussed. In an instructor guided experiential, patients were guided to practice PMR to help reduce physical symptoms of psychological stress and achieve a more balanced feeling of well-being.    Patient Session Goals / Objectives:  ? Identified physiological symptoms of stress on the body  ? Listed & Explained the purpose and benefits to practicing PMR   ? Practiced progressive muscle relaxation experiential  Telemedicine Visit: The patient's condition can be safely assessed and treated via synchronous audio and visual telemedicine encounter.      Reason for Telemedicine Visit: Services only offered telehealth    Originating Site (Patient Location): Patient's home    Distant Site (Provider Location): Provider Remote Setting    Consent:  The patient/guardian has verbally consented to: the potential risks and benefits of telemedicine (video visit) versus in person care; bill my insurance or make self-payment for services provided; and responsibility for payment of non-covered services.     Mode of Communication:  Video Conference via Dotspin    As the provider I attest to compliance with applicable  laws and regulations related to telemedicine.       Patient Participation / Response:  Moderately participated, sharing some personal reflections / insights and adequately adequately received / provided feedback with other participants.    Identified / Expressed personal readiness to practice skills    Treatment Plan:  Patient has a current master individualized treatment plan.  See Epic treatment plan for more information.    Tomeka Teixeira RN

## 2021-03-18 NOTE — GROUP NOTE
"Process Group Note    PATIENT'S NAME: Mariusz Huntley  MRN:   [id number]  :   1985  ACCT. NUMBER: [id number]  DATE OF SERVICE: 3/18/21  START TIME:  9:00 AM  END TIME:  9:50 AM  FACILITATOR: Zuri Armando LPCC  TOPIC:  Process Group    Diagnoses:  Axis I:     1.  Major depressive disorder, recurrent.     2.  Generalized anxiety disorder.     3.  Posttraumatic stress disorder.   4.  Cannabis use disorder.     5.  Alcohol use disorder, in remission.   Axis II:  Deferred.   Axis III:  History of concussion 2020.       [facility] Adult Partial Hospitalization Program  TRACK: [facility]    NUMBER OF PARTICIPANTS: 4    Telemedicine Visit: The patient's condition can be safely assessed and treated via synchronous audio and visual telemedicine encounter.      Reason for Telemedicine Visit: Services only offered telehealth and due to COVID-19    Originating Site (Patient Location): Patient's home    Distant Site (Provider Location): Provider Remote Setting    Consent:  The patient/guardian has verbally consented to: the potential risks and benefits of telemedicine (video visit) versus in person care; bill my insurance or make self-payment for services provided; and responsibility for payment of non-covered services.     Mode of Communication:  Video Conference via Zoom    As the provider I attest to compliance with applicable laws and regulations related to telemedicine.            Data:    Session content: At the start of this group, patients were invited to check in by identifying themselves, describing their current emotional status, and identifying issues to address in this group.   Area(s) of treatment focus addressed in this session included Symptom Management, Personal Safety and Community Resources/Discharge Planning.    Patient reported feeling \"like I am struggling today.\" Patient discussed working toward not focusing on intrusive thoughts. Patient identified mindfulness as skills they will " use to address their goal(s). Patient reported intrusive thoughts may be a barrier to working toward their goal(s) and/or addressing mental health symptoms. Patient reported no safety concerns and/or self-injurious behaviors. Patient reported no substance use. Patient reported they are taking their medications as prescribed. Patient reported feeling proud that they are here. Patient discussed a nightmare with the treatment group.     Therapeutic Interventions/Treatment Strategies:  Psychotherapist offered support, feedback and validation and reinforced use of skills. Treatment modalities used include Motivational Interviewing and Cognitive Behavioral Therapy. Interventions include Coping Skills: Assisted patient in identifying 1-2 healthy distraction skills to reduce overall distress, Symptoms Management: Promoted understanding of their diagnoses and how it impacts their functioning and Emotions Management:  Discussed barriers to emotional regulation.    Assessment:    Patient response:   Patient responded to session by accepting feedback, giving feedback, listening, focusing on goals, being attentive and accepting support    Possible barriers to participation / learning include: and no barriers identified    Health Issues:   None reported       Substance Use Review:   Substance Use: No active concerns identified.    Mental Status/Behavioral Observations  Appearance:   Appropriate   Eye Contact:   Good   Psychomotor Behavior: Normal   Attitude:   Cooperative   Orientation:   All  Speech   Rate / Production: Normal/ Responsive Normal    Volume:  Normal   Mood:    Anxious  Depressed  Normal Sad   Affect:    Appropriate   Thought Content:   Clear and Safety denies any current safety concerns including suicidal ideation, self-harm, and homicidal ideation  Thought Form:  Coherent  Logical     Insight:    Good     Plan:     Safety Plan: No current safety concerns identified.  Recommended that patient call 911 or go to the  local ED should there be a change in any of these risk factors.     Barriers to treatment: None identified    Patient Contracts (see media tab):  None    Substance Use: Provided encouragement towards sobriety     Continue or Discharge: Patient will continue in Adult Partial Hospitalization Program (PHP)  as planned. Patient is likely to benefit from learning and using skills as they work toward the goals identified in their treatment plan.      Zuri Armando, [facility]  March 18, 2021

## 2021-03-18 NOTE — GROUP NOTE
Psychoeducation Group Note    PATIENT'S NAME: Mariusz Huntley  MRN:   [id number]  :   1985  ACCT. NUMBER: [id number]  DATE OF SERVICE: 3/18/21  START TIME: 11:00 AM  END TIME: 11:50 AM  FACILITATOR: Raman Guerra OT  TOPIC: [facility] OT Group: Self- Regulation Skills  [facility] Adult Partial Hospitalization Program  TRACK: 1    Telemedicine Visit: The patient's condition can be safely assessed and treated via synchronous audio and visual telemedicine encounter.      Reason for Telemedicine Visit: Services only offered telehealth and covid 19    Originating Site (Patient Location): Patient's home    Distant Site (Provider Location): [facility] Hospital: [facility] Building: Outpatient Mental [facility]    Consent:  The patient/guardian has verbally consented to: the potential risks and benefits of telemedicine (video visit) versus in person care; bill my insurance or make self-payment for services provided; and responsibility for payment of non-covered services.     Mode of Communication:  Video Conference via Medical Zoom    As the provider I attest to compliance with applicable laws and regulations related to telemedicine.      NUMBER OF PARTICIPANTS: 5    Summary of Group / Topics Discussed:  Occupational Therapy: Self-Regulation Skills: The vagus nerve and developing autonomic flexibility and modulation:   Patient's explored the neurosensory connections between the body and the mind including skills and techniques to help develop one's capacity to be self aware and promote agency with self regulation to support participation in meaningful activities and promote mental wellbeing.     Patients were provided with psychoeducation on the Autonomic Nervous System and explored the ways to tap into the calming properties of the Vagus nerve (parasympathetic NS) to help with distress tolerance and manage the long-term impact of stressors on the body. Concepts  presented and discussed included: interoception, polyvagal theory (neuroception, ventral and dorsal vagal activity, sympathetic, vagal brake). Explored ways to develop vagal tone and heart rate variability over time as a strategy to support effective stress responses in specific contexts for improved functioning. Facilitated discussion around specific ways to they are doing this already and some possibilities for moving forward including: breath work, cold exposure, dietary: probiotics and omega 3's, meditation, physical activity, listening to music, socializing & laughing, talking/singing/humming/gargling as based on current neurosensory science.    Patient Session Goals / Objectives:    [place] how the ANS works with an emphasis on the anatomy and physiology of the vagus nerve and importance of its' impact on functioning and mental wellbeing.    [place] about the Polyvagal theory as a framework to begin to understand stress responses and autonomic regulation. (Salvatore)    [place] how developing interoceptive awareness can help one self-regulate sooner rather than later.    Identified specific and individualized neurosensory skills to help when distressed and for crisis management by tapping into the calming vagus nerve.    Identified specific ways to develop/build vagal tone to support mental health management and chose one to establish a plan for practice of these skills in their own environments aligned with their Occupational Therapy goals.       Patient Participation / Response:  Fully participated with the group by sharing personal reflections / insights and openly received / provided feedback with other participants.    Verbalized understanding of content and Patient would benefit from additional opportunities to practice the content to be able to generalize it to their everyday life with increased intentionality, consistency, and efficacy in support of their psychiatric recovery    Treatment Plan:  Patient  has a current master individualized treatment plan.  See Epic treatment plan for more information.    Raman Guerra, OT

## 2021-03-19 ENCOUNTER — HOSPITAL ENCOUNTER (OUTPATIENT)
Dept: BEHAVIORAL HEALTH | Facility: CLINIC | Age: 36
End: 2021-03-19
Attending: PSYCHIATRY & NEUROLOGY
Payer: COMMERCIAL

## 2021-03-19 DIAGNOSIS — F33.1 MAJOR DEPRESSIVE DISORDER, RECURRENT EPISODE, MODERATE (H): ICD-10-CM

## 2021-03-19 DIAGNOSIS — F43.10 PTSD (POST-TRAUMATIC STRESS DISORDER): ICD-10-CM

## 2021-03-19 PROCEDURE — H0035 MH PARTIAL HOSP TX UNDER 24H: HCPCS | Mod: 95 | Performed by: SOCIAL WORKER

## 2021-03-19 PROCEDURE — H0035 MH PARTIAL HOSP TX UNDER 24H: HCPCS | Mod: 95 | Performed by: COUNSELOR

## 2021-03-19 PROCEDURE — H0035 MH PARTIAL HOSP TX UNDER 24H: HCPCS | Mod: GT

## 2021-03-19 PROCEDURE — H0035 MH PARTIAL HOSP TX UNDER 24H: HCPCS | Mod: GT | Performed by: OCCUPATIONAL THERAPIST

## 2021-03-19 NOTE — PROGRESS NOTES
[facility]   Dr. Villa's Psychiatric Progress Note  2021      Patient:  Mariusz Huntley   Medical Record Number:  [id number]  :  1985    Telemedicine Visit: The patient's condition can be safely assessed and treated via synchronous audio and visual telemedicine encounter.       Reason for Telemedicine Visit: COVID-19 lockdown     Originating Site (Patient Location):  HOME     Distant Site (Provider Location): [facility]: [place]     Consent:  The patient/guardian has verbally consented to: the potential risks and benefits of telemedicine (video visit) versus in person care; bill my insurance or make self-payment for services provided; and responsibility for payment of non-covered services.        Interim History:   The patient's care was discussed with the treatment team and chart notes were reviewed.    3/15/2:  VPA was started on 3/12/21.  Weekend was ok.  She was down most of the weekend, even though she had her kids.  Emotionally rough weekend.      3/17/21:  Pt has been very anxious.  Depression is 50% as bad as anxiety.      3/19/21:  Mood is better today.  Had nightmares and flashbacks Wed. Night. Th. Was bad.  Going out Sat. To eat tacos in [place] and going to [employer] stores.       Psychiatric ROS:  Mood: anxious depressed  Sleep:  Less nightmares.  She increased Trazodone to 150mg.   Appetite:  better  Eating: 3 meals/d  Energy Level:   Good, best in days  Concentration/Memory Problems:  better  Suicidal Thoughts:No  Homicidal Thoughts:No  Psychotic Symptoms: No  Medication Side Effects:No  Medication Compliance:Yes   Physical Complaints:  none         Medications:     PAST PSYCH MEDS:  Depakote ER, Wellbutrin, Zoloft, Lexapro, Neurontin, Vistaril, trazodone and Ritalin.    Current Outpatient Medications   Medication Sig     acetaminophen (TYLENOL) 325 MG tablet Take 2 tablets (650 mg) by mouth every 4 hours as needed for mild  pain (to moderate pain)                 divalproex sodium extended-release (DEPAKOTE ER) 250 MG 24 hr tablet Take 1 po at bedtime x 2 days then 2 po at bedtime x 2 days then 3 po at bedtime x 2 days then 4 po qHS     escitalopram (LEXAPRO) 10 MG tablet Take 1 tablet (10 mg) by mouth daily     gabapentin (NEURONTIN) 600 MG tablet Take 800 mg by mouth 3 times daily            hydrOXYzine (ATARAX) 50 MG tablet 100mg bid     ibuprofen (ADVIL/MOTRIN) 600 MG tablet Take 1 tablet (600 mg) by mouth every 6 hours as needed for moderate pain     LORazepam (ATIVAN) 2 MG tablet Take 2 mg by mouth every 6 hours as needed for anxiety Reports 10 mg ??     melatonin 3 MG tablet Take 1-2 tablets (3-6 mg) by mouth nightly as needed for sleep     methyl salicylate-menthol (TONY-OCAMPO) OINT ointment Apply 1 g topically every 4 hours as needed (pain, apply to back)     Multiple Vitamins-Minerals (MULTIVITAMIN GUMMIES ADULT PO) Take 4 tablets by mouth daily                 propranolol (INDERAL) 40 MG tablet Take 1 tablet (40 mg) by mouth 2 times daily     senna-docusate (SENOKOT-S/PERICOLACE) 8.6-50 MG tablet Take 1 tablet by mouth 2 times daily as needed for constipation     traZODone (DESYREL) 100 MG tablet Take 150 mg to 200mg by mouth At Bedtime           Vitamin D3 (CHOLECALCIFEROL) 25 mcg (1000 units) tablet Take 2 tablets (50 mcg) by mouth daily     No current facility-administered medications for this encounter.              Allergies:     Allergies   Allergen Reactions     Honeybees [Bees] Anaphylaxis     Sulfamethoxazole-Trimethoprim Nausea and Vomiting     Codeine      Sulfa Drugs      Amoxicillin Rash     Codeine Rash            Psychiatric Examination:   [facility] 02/20/2021 (Exact Date)   Weight is 0 lbs 0 oz  There is no height or weight on file to calculate BMI.    Appearance:  awake, alert and adequately groomed  Attitude:  cooperative  Eye Contact:  good  Mood:  Anxious and depressed  Affect:  mood congruent  Speech:  clear,  coherent  Psychomotor Behavior:  no evidence of tardive dyskinesia, dystonia, or tics  Throught Process:  logical  Associations:  no loose associations  Thought Content:  no evidence of suicidal ideation or homicidal ideation and no evidence of psychotic thought  Insight:  good  Judgement:  intact  Oriented to:  time, person, and place  Attention Span and Concentration:  intact  Recent and Remote Memory:  intact  Gait:Normal    Risk/Potential for Dangerousness:  Multiple Active Diagnoses:HIGH  Self Care:HIGH  Suicide:LOW  Assault:LOW  Self Injurious Behaviors:LOW  Inappropriate Sexual Behavior:LOW         Labs:   No results found for this or any previous visit (from the past 24 hour(s)).     No results found for this or any previous visit (from the past 1008 hour(s)).      Impression:   This is a 35 year old female continues PHP for mood stabilization.  Mood is anxious and depressed.  Groups are going well.           DIagnoses:     Axis I:     1.  Major depressive disorder, recurrent.     2.  Generalized anxiety disorder.     3.  Posttraumatic stress disorder.   4.  Cannabis use disorder.     5.  Alcohol use disorder, in remission.   Axis II:  Deferred.   Axis III:  History of concussion 02/2020.            Plan:     Continue [facility] Program.  Finishes up 3/25/21.   Started Depakote  mg at bedtime for 2 days, then increasing to 5 mg at bedtime for 2 days, then increasing to 750 mg at bedtime for 2 days, then increasing to 1000 mg at bedtime.   Will order labs once she reaches steady state.  Increased Trazodone from 100mg to 150 or 200mg at bedtime prn.   Will likely taper off some of the medications which she feels are not helping.   Expect stabilization and completion of Partial Hospital Program.   Follow up with current outpatient medication prescriber and therapist.     Archie Villa MD

## 2021-03-19 NOTE — GROUP NOTE
Process Group Note    PATIENT'S NAME: Mariusz Huntley  MRN:   [id number]  :   1985  ACCT. NUMBER: [id number]  DATE OF SERVICE: 3/19/21  START TIME:  9:00 AM  END TIME:  9:50 AM  FACILITATOR: Zuri Armando LPCC  TOPIC:  Process Group    Diagnoses:  Axis I:     1.  Major depressive disorder, recurrent.     2.  Generalized anxiety disorder.     3.  Posttraumatic stress disorder.   4.  Cannabis use disorder.     5.  Alcohol use disorder, in remission.   Axis II:  Deferred.   Axis III:  History of concussion 2020.       [facility] Adult Partial Hospitalization Program  TRACK: [facility] [place]    NUMBER OF PARTICIPANTS: 4    Telemedicine Visit: The patient's condition can be safely assessed and treated via synchronous audio and visual telemedicine encounter.      Reason for Telemedicine Visit: Services only offered telehealth and due to COVID-19    Originating Site (Patient Location): Patient's home    Distant Site (Provider Location): Provider Remote Setting    Consent:  The patient/guardian has verbally consented to: the potential risks and benefits of telemedicine (video visit) versus in person care; bill my insurance or make self-payment for services provided; and responsibility for payment of non-covered services.     Mode of Communication:  Video Conference via Zoom    As the provider I attest to compliance with applicable laws and regulations related to telemedicine.            Data:    Session content: At the start of this group, patients were invited to check in by identifying themselves, describing their current emotional status, and identifying issues to address in this group.   Area(s) of treatment focus addressed in this session included Symptom Management, Personal Safety and Community Resources/Discharge Planning.    Patient reported feeling pretty good today and better than yesterday. Patient discussed working toward maintaining positive mood. Patient identified vagal tone as skills they  will use to address their goal(s). Patient reported random thoughts may be a barrier to working toward their goal(s) and/or addressing mental health symptoms. Patient reported no safety concerns and/or self-injurious behaviors. Patient reported no substance use. Patient reported they are taking their medications as prescribed. Patient reported feeling proud that they went for a drive and sang yesterday. Patient discussed increased concentration with the treatment group.     Therapeutic Interventions/Treatment Strategies:  Psychotherapist offered support, feedback and validation and reinforced use of skills. Treatment modalities used include Motivational Interviewing and Cognitive Behavioral Therapy. Interventions include Coping Skills: Assisted patient in identifying 1-2 healthy distraction skills to reduce overall distress, Symptoms Management: Promoted understanding of their diagnoses and how it impacts their functioning and Emotions Management:  Discussed barriers to emotional regulation.    Assessment:    Patient response:   Patient responded to session by accepting feedback, giving feedback, listening, focusing on goals, being attentive and accepting support    Possible barriers to participation / learning include: and no barriers identified    Health Issues:   None reported       Substance Use Review:   Substance Use: No active concerns identified.    Mental Status/Behavioral Observations  Appearance:   Appropriate   Eye Contact:   Good   Psychomotor Behavior: Normal   Attitude:   Cooperative   Orientation:   All  Speech   Rate / Production: Normal/ Responsive Normal    Volume:  Normal   Mood:    Anxious  Depressed  Normal Sad   Affect:    Appropriate   Thought Content:   Clear and Safety denies any current safety concerns including suicidal ideation, self-harm, and homicidal ideation  Thought Form:  Coherent  Logical     Insight:    Good     Plan:     Safety Plan: No current safety concerns identified.   Recommended that patient call 911 or go to the local ED should there be a change in any of these risk factors.     Barriers to treatment: None identified    Patient Contracts (see media tab):  None    Substance Use: Provided encouragement towards sobriety     Continue or Discharge: Patient will continue in Adult Partial Hospitalization Program (PHP)  as planned. Patient is likely to benefit from learning and using skills as they work toward the goals identified in their treatment plan.      Zuri Armando, [facility]  March 19, 2021

## 2021-03-19 NOTE — GROUP NOTE
Psychotherapy Group Note    PATIENT'S NAME: Mariusz Huntley  MRN:   [id number]  :   1985  ACCT. NUMBER: [id number]  DATE OF SERVICE: 3/19/21  START TIME:  1:00 PM  END TIME:  1:50 PM  FACILITATOR: Zuri Armando LPCC  TOPIC: MH EBP Group: Coping Skills  [facility] Adult Partial Hospitalization Program  TRACK: [facility]    NUMBER OF PARTICIPANTS: 4    Summary of Group / Topics Discussed:  Coping Skills: Improve the Moment: Patients learned to tolerate distress, by applying strategies to effect positive change in the present moment.  Patients will identified situations where they would benefit from applying strategies to improve the moment and reduce distress. Patients discussed how to distinguish when this can be useful in their lives or when other strategies would be more relevant or helpful.    Patient Session Goals / Objectives:    Discuss how the use of intentional  in the moment  actions can help reduce distress.    Review patients current practices and discuss a more formal way of practicing and accessing skills.    Increase ability to decide when to use improve the moment strategies    Choose 1-2 in the moment actions to apply during times of distress.    Telemedicine Visit: The patient's condition can be safely assessed and treated via synchronous audio and visual telemedicine encounter.      Reason for Telemedicine Visit: Services only offered telehealth and due to COVID-19    Originating Site (Patient Location): Patient's home    Distant Site (Provider Location): Provider Remote Setting    Consent:  The patient/guardian has verbally consented to: the potential risks and benefits of telemedicine (video visit) versus in person care; bill my insurance or make self-payment for services provided; and responsibility for payment of non-covered services.     Mode of Communication:  Video Conference via Zoom    As the provider I attest to compliance with applicable laws and regulations related to  telemedicine.        Patient Participation / Response:  Fully participated with the group by sharing personal reflections / insights and openly received / provided feedback with other participants.    Demonstrated understanding of topics discussed through group discussion and participation, Expressed understanding of the relevance / importance of coping skills at distressing times in life, Demonstrated knowledge of when to consider using a variety of coping skills in daily life and Identified barriers to applying coping skills    Treatment Plan:  Patient has a current master individualized treatment plan.  See Epic treatment plan for more information.    Zuri Armando, [facility]

## 2021-03-19 NOTE — GROUP NOTE
Psychoeducation Group Note    PATIENT'S NAME: Mariusz Huntley  MRN:   [id number]  :   1985  ACCT. NUMBER: [id number]  DATE OF SERVICE: 3/19/21  START TIME: 11:00 AM  END TIME: 11:50 AM  FACILITATOR: Sheila Henderson OTR/L  TOPIC: [facility] OT Group: Lifestyle Balance and Structure  [facility] Adult Partial Hospitalization Program  TRACK: 1    NUMBER OF PARTICIPANTS: 3    Telemedicine Visit: The patient's condition can be safely assessed and treated via synchronous audio and visual telemedicine encounter.      Reason for Telemedicine Visit: Services only offered telehealth    Originating Site (Patient Location): Patient's home    Distant Site (Provider Location): [facility]    Consent:  The patient/guardian has verbally consented to: the potential risks and benefits of telemedicine (video visit) versus in person care; bill my insurance or make self-payment for services provided; and responsibility for payment of non-covered services.     Mode of Communication:  Video Conference via Zoom    As the provider I attest to compliance with applicable laws and regulations related to telemedicine.     Summary of Group / Topics Discussed:  Lifestyle Balance and Structure:  OT Goal-setting & integration: Weekend Wellness: The group focused on reflecting on the past week and identifying insights and positive experiences that they want to build upon further.  The group also focused on identifying needs and any concerns for the upcoming weekend and created a list of activities and tasks that they might engage in to help support themselves and add structure their weekend.  Facilitated the sharing of individual insights and plans with validation, support, and feedback provided.    Patient Session Goals / Objectives:    Reflected on insights learned and positive experiences over the last week to help with their recovery and wellbeing    Identified needs and  concerns for the upcoming weekend and identified ways to address these    Created a written list of possible ways to structure their weekend    Identified plan to support follow through on plans and reflection on progress made       Patient Participation / Response:  Fully participated with the group by sharing personal reflections / insights and openly received / provided feedback with other participants.    Patient presentation: congruent affect; active in group discussion; has plans for the weekend , Verbalized understanding of content and Patient would benefit from additional opportunities to practice the content to be able to generalize it to their everyday life with increased intentionality, consistency, and efficacy in support of their psychiatric recovery    Treatment Plan:  Patient has a current master individualized treatment plan.  See Epic treatment plan for more information.    Sheila Henderson, OTR/L

## 2021-03-19 NOTE — GROUP NOTE
Psychotherapy Group Note    PATIENT'S NAME: Mariusz Huntley  MRN:   [id number]  :   1985  ACCT. NUMBER: [id number]  DATE OF SERVICE: 3/19/21  START TIME:  2:00 PM  END TIME:  2:50 PM  FACILITATOR: Karine Mallory LICSW  TOPIC: MH EBP Group: Specialty Awareness  [facility] Adult Partial Hospitalization Program  TRACK: 1    NUMBER OF PARTICIPANTS: 4    Summary of Group / Topics Discussed:  Specialty Topics: Hope: The topic of hope was presented in order to help patients better understand the symptoms of hopelessness and how to become more hopeful. Patients discussed their current awareness of the topic and relevance to their functioning. Individual experiences with symptoms and treatment options were also discussed. Patients explored options for ongoing/future treatment and symptom management.      Patient Session Goals / Objectives:    Discussed definition of hopelessness    Discussed how hopelessness impacts functioning    Set a plan to utilize skills to reduce hopelessness    Telemedicine Visit: The patient's condition can be safely assessed and treated via synchronous audio and visual telemedicine encounter.          Reason for Telemedicine Visit: Services only offered telehealth and covid19        Originating Site (Patient Location): Patient's home        Distant Site (Provider Location): Provider Remote Setting        Consent:  The patient/guardian has verbally consented to: the potential risks and benefits of telemedicine (video visit) versus in person care; bill my insurance or make self-payment for services provided; and responsibility for payment of non-covered services.         Mode of Communication:  Video Conference via [employer]        As the provider I attest to compliance with applicable laws and regulations related to telemedicine.         Patient Participation / Response:  Fully participated with the group by sharing personal reflections / insights and openly received /  provided feedback with other participants.    Demonstrated understanding of topics discussed through group discussion and participation and Practiced skills in session    Treatment Plan:  Patient has a current master individualized treatment plan.  See Epic treatment plan for more information.    YAW Manley

## 2021-03-19 NOTE — GROUP NOTE
Psychoeducation Group Note    PATIENT'S NAME: Mariusz Huntley  MRN:   [id number]  :   1985  ACCT. NUMBER: [id number]  DATE OF SERVICE: 3/19/21  START TIME: 10:00 AM  END TIME: 10:50 AM  FACILITATOR: Raman Guerra OT  TOPIC: [facility] OT Group: Lifestyle Balance and Structure  [facility] Adult Partial Hospitalization Program  TRACK: 1    Telemedicine Visit: The patient's condition can be safely assessed and treated via synchronous audio and visual telemedicine encounter.      Reason for Telemedicine Visit: Services only offered telehealth and Covid 19    Originating Site (Patient Location): Patient's home    Distant Site (Provider Location): [facility]: [facility] Building: Outpatient Mental Health and Addiction Services    Consent:  The patient/guardian has verbally consented to: the potential risks and benefits of telemedicine (video visit) versus in person care; bill my insurance or make self-payment for services provided; and responsibility for payment of non-covered services.     Mode of Communication:  Video Conference via Medical Zoom    As the provider I attest to compliance with applicable laws and regulations related to telemedicine.      NUMBER OF PARTICIPANTS: 3    Summary of Group / Topics Discussed:  Occupational Therapy: Lifestyle Health:  Weaving a Mindful Lifestyle.   Objective of session is to demystify mindfulness and leverage its benefits (developing resiliency and decrease impulsivity) by identifying ways to make it more accessible, patients were provided with education on the why, what & how, and ways to practice mindfulness through chosen activities using the metaphor of the components of a weaving (warp/weft). To improve self-efficacy with mindfulness, group members explored and applied the concept of levels of brain activation to help them identify what type of mindful activities might be most helpful depending on context and what part of the  brain is dominating (brainstem, midbrain, cerebellum, limbic, cortex). Group members were led through a structured experiential process where as a group they worked to create a mindful weaving of a variety of activities in context for each level of brain activation. These included: grounding activities, activities of daily living, informal sensorimotor activities, connecting activities, focused cognitive activities, and lastly formal meditative activities such as guided imagery or mindful movement such as yoga or morro chi. Patients shared their reflections and exchanged ideas and validation with each other.      Patient Session Goals / Objectives:    Learn about the why, what & how, and ways to create a lifestyle health by brainstorming and beginning to weave a mindful lifestyle.     Demystify the concept of mindfulness and apply curiosity and openness to practicing mindfulness in ways that honor context, are accessible and attune to individual patient values.     Identify two activities that they can apply taught concepts to in the development of a mindful lifestyle.    Identify strategies and skills to meet specific needs and address barriers.      Patient Participation / Response:  Fully participated with the group by sharing personal reflections / insights and openly received / provided feedback with other participants.    Verbalized understanding of content and Patient would benefit from additional opportunities to practice the content to be able to generalize it to their everyday life with increased intentionality, consistency, and efficacy in support of their psychiatric recovery    Treatment Plan:  Patient has a current master individualized treatment plan.  See Epic treatment plan for more information.    Raman Guerra, OT

## 2021-03-22 ENCOUNTER — HOSPITAL ENCOUNTER (OUTPATIENT)
Dept: BEHAVIORAL HEALTH | Facility: CLINIC | Age: 36
End: 2021-03-22
Attending: PSYCHIATRY & NEUROLOGY
Payer: COMMERCIAL

## 2021-03-22 DIAGNOSIS — F33.1 MAJOR DEPRESSIVE DISORDER, RECURRENT EPISODE, MODERATE (H): ICD-10-CM

## 2021-03-22 DIAGNOSIS — F43.10 PTSD (POST-TRAUMATIC STRESS DISORDER): ICD-10-CM

## 2021-03-22 DIAGNOSIS — F33.2 SEVERE RECURRENT MAJOR DEPRESSION WITHOUT PSYCHOTIC FEATURES (H): Primary | ICD-10-CM

## 2021-03-22 PROCEDURE — H0035 MH PARTIAL HOSP TX UNDER 24H: HCPCS | Mod: 95

## 2021-03-22 PROCEDURE — H0035 MH PARTIAL HOSP TX UNDER 24H: HCPCS | Mod: GT | Performed by: OCCUPATIONAL THERAPIST

## 2021-03-22 NOTE — GROUP NOTE
Psychoeducation Group Note    PATIENT'S NAME: Mariusz Huntley  MRN:   [id number]  :   1985  ACCT. NUMBER: [id number]  DATE OF SERVICE: 3/22/21  START TIME: 11:00 AM  END TIME: 11:50 AM  FACILITATOR: Raman Guerra OT  TOPIC: [facility] OT Group: Lifestyle Balance and Structure  [facility] Adult Partial Hospitalization Program  TRACK: *1    Telemedicine Visit: The patient's condition can be safely assessed and treated via synchronous audio and visual telemedicine encounter.      Reason for Telemedicine Visit: Services only offered telehealth and Covid 19    Originating Site (Patient Location): Patient's home    Distant Site (Provider Location): [facility]: [facility]: Outpatient Mental Health and Addiction Services    Consent:  The patient/guardian has verbally consented to: the potential risks and benefits of telemedicine (video visit) versus in person care; bill my insurance or make self-payment for services provided; and responsibility for payment of non-covered services.     Mode of Communication:  Video Conference via [employer]    As the provider I attest to compliance with applicable laws and regulations related to telemedicine.      NUMBER OF PARTICIPANTS: 4    Summary of Group / Topics Discussed:  Lifestyle Balance and Structure:  OT Goal-setting & integration: To support progress towards treatment goals and psychiatric recovery, group members were led through a weekly structured process to reflect on progress, integrate new learning/skills, and emerging self-awareness into their daily and weekly life. Provided psychoeducation on the neuroscience of change, self-compassion, and the anatomy of a SMART goal to the group. Facilitated the sharing of individual goals with validation, support, and feedback provided.    Patient Session Goals / Objectives:    Reflected on and create a vision for recovery and wellbeing    Identified and wrote 3 SMART goals for  the week    Identified and problem solved barriers to achieving identified goals     Identified plan to support follow through on goals and reflection on progress made        Patient Participation / Response:  Fully participated with the group by sharing personal reflections / insights and openly received / provided feedback with other participants.    Verbalized understanding of content and Patient would benefit from additional opportunities to practice the content to be able to generalize it to their everyday life with increased intentionality, consistency, and efficacy in support of their psychiatric recovery    Treatment Plan:  Patient has a current master individualized treatment plan.  See Epic treatment plan for more information.    Raman Guerra, OT

## 2021-03-22 NOTE — GROUP NOTE
Telemedicine Visit: The patient's condition can be safely assessed and treated via synchronous audio and visual telemedicine encounter.      Reason for Telemedicine Visit: Services only offered telehealth    Originating Site (Patient Location): Patient's home    Distant Site (Provider Location): Provider Remote Setting    Consent:  The patient/guardian has verbally consented to: the potential risks and benefits of telemedicine (video visit) versus in person care; bill my insurance or make self-payment for services provided; and responsibility for payment of non-covered services.     Mode of Communication:  Video Conference via [email or web address]    As the provider I attest to compliance with applicable laws and regulations related to telemedicine.  Process Group Note    PATIENT'S NAME: Mariusz Huntley  MRN:   [id number]  :   1985  ACCT. NUMBER: [id number]  DATE OF SERVICE: 3/22/21  START TIME:  9:00 AM  END TIME:  9:50 AM  FACILITATOR: Kat Cifuentes LMFT  TOPIC:  Process Group    Diagnoses:  Axis I:     1.  Major depressive disorder, recurrent.     2.  Generalized anxiety disorder.     3.  Posttraumatic stress disorder.   4.  Cannabis use disorder.     5.  Alcohol use disorder, in remission.   Axis II:  Deferred.   Axis III:  History of concussion 2020.        [facility] Adult Partial Hospitalization Program  TRACK: PHP 1    NUMBER OF PARTICIPANTS:5          Data:    Session content: At the start of this group, patients were invited to check in by identifying themselves, describing their current emotional status, and identifying issues to address in this group.   Area(s) of treatment focus addressed in this session included Symptom Management.  Processed feeling an increase in anxiety this morning to to feeling rushed to be present for group. Reports missed placed computer and stayed too long in shower causing her to be late and struggles with shame around being on time. Explored ways to ground  "self in moment and reduce anxiety. Reports she has been taking her meds and focusing on managing her mental health. No safety concerns.     Therapeutic Interventions/Treatment Strategies:  Psychotherapist offered support, feedback and validation and reinforced use of skills. Treatment modalities used include Cognitive Behavioral Therapy. Interventions include Coping Skills: Assisted patient in identifying 1-2 healthy distraction skills to reduce overall distress and Discussed how the use of intentional \"in the moment\" actions can help reduce distress.    Assessment:    Patient response:   Patient responded to session by accepting feedback, giving feedback, listening and verbalizing understanding    Possible barriers to participation / learning include: and no barriers identified    Health Issues:   None reported       Substance Use Review:   Substance Use: No active concerns identified.    Mental Status/Behavioral Observations  Appearance:   Appropriate   Eye Contact:   Good   Psychomotor Behavior: Normal   Attitude:   Cooperative   Orientation:   All  Speech   Rate / Production: Normal/ Responsive Normal    Volume:  Normal   Mood:    Anxious   Affect:    Worrisome   Thought Content:   Clear  Thought Form:  Coherent  Logical     Insight:    Fair     Plan:     Safety Plan: No current safety concerns identified.  Recommended that patient call 911 or go to the local ED should there be a change in any of these risk factors.     Barriers to treatment: None identified    Patient Contracts (see media tab):  None    Substance Use: Not addressed in session     Continue or Discharge: Patient will continue in Adult Partial Hospitalization Program (PHP)  as planned. Patient is likely to benefit from learning and using skills as they work toward the goals identified in their treatment plan.      KYRA Murray  March 22, 2021  "

## 2021-03-22 NOTE — PROGRESS NOTES
"[facility]   Dr. Villa's Psychiatric Progress Note  2021      Patient:  Mariusz Huntley   Medical Record Number:  [id number]  :  1985    Telemedicine Visit: The patient's condition can be safely assessed and treated via synchronous audio and visual telemedicine encounter.       Reason for Telemedicine Visit: COVID-19 lockdown     Originating Site (Patient Location):  HOME     Distant Site (Provider Location): [facility]: [place]     Consent:  The patient/guardian has verbally consented to: the potential risks and benefits of telemedicine (video visit) versus in person care; bill my insurance or make self-payment for services provided; and responsibility for payment of non-covered services.        Interim History:   The patient's care was discussed with the treatment team and chart notes were reviewed.    3/15/2:  VPA was started on 3/12/21.  Weekend was ok.  She was down most of the weekend, even though she had her kids.  Emotionally rough weekend.      3/17/21:  Pt has been very anxious.  Depression is 50% as bad as anxiety.      3/19/21:  Mood is better today.  Had nightmares and flashbacks Wed. Night. Th. Was bad.  Going out Sat. To eat tacos in [place] and going to [employer] stores.       3/22/21:  Weekend was good.  Spent lots of time with  and kids.  Lots of time outside.      Psychiatric ROS:  Mood: pretty good;  Got riled a couple times this weekend \"when I see people who are entitled\"  Was anxious today as was running behind getting ready  Sleep:  Pretty good;  Slept 7 hours;  She increased Trazodone to 150mg.   Appetite:  Pretty good  Eating: 3 meals/d  Energy Level:   A little low today  Concentration/Memory: ok not 100%  Suicidal Thoughts:No  Homicidal Thoughts:No  Psychotic Symptoms: No  Medication Side Effects:No  Medication Compliance:Yes   Physical Complaints:  none         Medications:     PAST PSYCH MEDS:  " Depakote ER, Wellbutrin, Zoloft, Lexapro, Neurontin, Vistaril, trazodone and Ritalin.    Current Outpatient Medications   Medication Sig     acetaminophen (TYLENOL) 325 MG tablet Take 2 tablets (650 mg) by mouth every 4 hours as needed for mild pain (to moderate pain)                 divalproex sodium extended-release (DEPAKOTE ER) 250 MG 24 hr tablet Take 1 po at bedtime x 2 days then 2 po at bedtime x 2 days then 3 po at bedtime x 2 days then 4 po qHS     escitalopram (LEXAPRO) 10 MG tablet Take 1 tablet (10 mg) by mouth daily     gabapentin (NEURONTIN) 600 MG tablet Take 800 mg by mouth 3 times daily            hydrOXYzine (ATARAX) 50 MG tablet 100mg bid     ibuprofen (ADVIL/MOTRIN) 600 MG tablet Take 1 tablet (600 mg) by mouth every 6 hours as needed for moderate pain     LORazepam (ATIVAN) 2 MG tablet Take 2 mg by mouth every 6 hours as needed for anxiety Reports 10 mg ??     melatonin 3 MG tablet Take 1-2 tablets (3-6 mg) by mouth nightly as needed for sleep     methyl salicylate-menthol (TONY-OCAMPO) OINT ointment Apply 1 g topically every 4 hours as needed (pain, apply to back)     Multiple Vitamins-Minerals (MULTIVITAMIN GUMMIES ADULT PO) Take 4 tablets by mouth daily                 propranolol (INDERAL) 40 MG tablet Take 1 tablet (40 mg) by mouth 2 times daily     senna-docusate (SENOKOT-S/PERICOLACE) 8.6-50 MG tablet Take 1 tablet by mouth 2 times daily as needed for constipation     traZODone (DESYREL) 100 MG tablet Take 150 mg to 200mg by mouth At Bedtime           Vitamin D3 (CHOLECALCIFEROL) 25 mcg (1000 units) tablet Take 2 tablets (50 mcg) by mouth daily     No current facility-administered medications for this encounter.              Allergies:     Allergies   Allergen Reactions     Honeybees [Bees] Anaphylaxis     Sulfamethoxazole-Trimethoprim Nausea and Vomiting     Codeine      Sulfa Drugs      Amoxicillin Rash     Codeine Rash            Psychiatric Examination:   [facility] 02/20/2021 (Exact Date)    Weight is 0 lbs 0 oz  There is no height or weight on file to calculate BMI.    Appearance:  awake, alert and adequately groomed  Attitude:  cooperative  Eye Contact:  good  Mood:  Anxious and depressed  Affect:  mood congruent  Speech:  clear, coherent  Psychomotor Behavior:  no evidence of tardive dyskinesia, dystonia, or tics  Throught Process:  logical  Associations:  no loose associations  Thought Content:  no evidence of suicidal ideation or homicidal ideation and no evidence of psychotic thought  Insight:  good  Judgement:  intact  Oriented to:  time, person, and place  Attention Span and Concentration:  intact  Recent and Remote Memory:  intact  Gait:Normal    Risk/Potential for Dangerousness:  Multiple Active Diagnoses:HIGH  Self Care:HIGH  Suicide:LOW  Assault:LOW  Self Injurious Behaviors:LOW  Inappropriate Sexual Behavior:LOW         Labs:   No results found for this or any previous visit (from the past 24 hour(s)).     No results found for this or any previous visit (from the past 1008 hour(s)).      Impression:   This is a 35 year old female continues PHP for mood stabilization.  Mood is anxious and depressed.  Groups are going well.           DIagnoses:     Axis I:     1.  Major depressive disorder, recurrent.     2.  Generalized anxiety disorder.     3.  Posttraumatic stress disorder.   4.  Cannabis use disorder.     5.  Alcohol use disorder, in remission.   Axis II:  Deferred.   Axis III:  History of concussion 02/2020.            Plan:     Continue [facility] Program.  Finishes up 3/25/21.   Started Depakote ER and titrated to 1000 mg at bedtime.   Will order labs once she reaches steady state at [facility]: VPA level, LFT's and CBC.    Increased Trazodone from 100mg to 150 or 200mg at bedtime prn.   Will likely taper off some of the medications which she feels are not helping.   Expect stabilization and completion of  Partial Hospital Program.   Follow up with current outpatient medication prescriber and therapist.     Archie Villa MD

## 2021-03-22 NOTE — GROUP NOTE
Psychoeducation Group Note    PATIENT'S NAME: Mariusz Huntley  MRN:   [id number]  :   1985  ACCT. NUMBER: [id number]  DATE OF SERVICE: 3/22/21  START TIME: 10:00 AM  END TIME: 10:50 AM  FACILITATOR: Raman Guerra OT  TOPIC: [facility] OT Group: Partial Hospitalization Program- Occupational Therapy  [facility] Adult Partial Hospitalization Program  TRACK: 1    Telemedicine Visit: The patient's condition can be safely assessed and treated via synchronous audio and visual telemedicine encounter.      Reason for Telemedicine Visit: Services only offered telehealth and Covid 19    Originating Site (Patient Location): Patient's home    Distant Site (Provider Location): [facility]: [facility], [facility]. Outpatient Mental Health and Addiction Services    Consent:  The patient/guardian has verbally consented to: the potential risks and benefits of telemedicine (video visit) versus in person care; bill my insurance or make self-payment for services provided; and responsibility for payment of non-covered services.     Mode of Communication:  Video Conference via [employer]    As the provider I attest to compliance with applicable laws and regulations related to telemedicine.      NUMBER OF PARTICIPANTS: 5    Summary of Group / Topics Discussed:  Occupational Therapy: Resiliency Development: Values based vision for recovery:Patients will explore the impact of their values on their thoughts, feelings, and actions. Patients were provided psychoeducation and facilitated experiential process where they took time to identify their values based vision for their recovery by asking themselves specific questions including: to think out a month or two and try to image what their recovery will look like in terms of what they will be doing,why will they be doing it, how will it feel when they are doing these things and also what other potential possibilities might open up for them. This  session was designed to help set their course for their recovery in term of possibility and positive language. Session also provided to support the goal integration session in the next hour. Reflection/sharing/validation provided to help support development of resiliency amongst a difficult time.     Patient Session Goals / Objectives:    ? Create a values based vision statement for their recovery  ? Verbalize understanding of current values and the impact on current symptoms   ? Verbalize understanding of strategies to change or enhance behaviors to live in congruence with identified values             Patient Participation / Response:  Fully participated with the group by sharing personal reflections / insights and openly received / provided feedback with other participants.    Verbalized understanding of content and Patient would benefit from additional opportunities to practice the content to be able to generalize it to their everyday life with increased intentionality, consistency, and efficacy in support of their psychiatric recovery    Treatment Plan:  Patient has a current master individualized treatment plan.  See Epic treatment plan for more information.    Raman Guerra, OT

## 2021-03-22 NOTE — GROUP NOTE
Psychoeducation Group Note    PATIENT'S NAME: Mariusz Huntley  MRN:   [id number]  :   1985  ACCT. NUMBER: [id number]  DATE OF SERVICE: 3/22/21  START TIME:  2:00 PM  END TIME:  2:50 PM  FACILITATOR: Tomeka Teixeira RN  TOPIC: MARIA ESTHER RN Group: Mental Health Maintenance  [facility] Adult Partial Hospitalization Program  TRACK: 1    NUMBER OF PARTICIPANTS: 5    Summary of Group / Topics Discussed:  Mental Health Maintenance:  Assessments of Strengths: Patients completed a self-reflection on personal strengths worksheet. The concept of personal strength as it relates to resilience were explored. Patients shared responses with the group and participated in discussion.     Patient Session Goals / Objectives:  ? Patients identified 1-3 qualities they consider a personal strength.  ? Patients understood the concept of personal strengths and the connection it has to resiliency  Telemedicine Visit: The patient's condition can be safely assessed and treated via synchronous audio and visual telemedicine encounter.      Reason for Telemedicine Visit: Services only offered telehealth    Originating Site (Patient Location): Patient's home    Distant Site (Provider Location): Provider Remote Setting    Consent:  The patient/guardian has verbally consented to: the potential risks and benefits of telemedicine (video visit) versus in person care; bill my insurance or make self-payment for services provided; and responsibility for payment of non-covered services.     Mode of Communication:  Video Conference via [employer]    As the provider I attest to compliance with applicable laws and regulations related to telemedicine.       Patient Participation / Response:  Moderately participated, sharing some personal reflections / insights and adequately adequately received / provided feedback with other participants.    Demonstrated understanding of topics discussed through group discussion and  participation    Treatment Plan:  Patient has a current master individualized treatment plan.  See Epic treatment plan for more information.    Tomeka Teixeira RN

## 2021-03-23 ENCOUNTER — HOSPITAL ENCOUNTER (OUTPATIENT)
Dept: BEHAVIORAL HEALTH | Facility: CLINIC | Age: 36
End: 2021-03-23
Attending: PSYCHIATRY & NEUROLOGY
Payer: COMMERCIAL

## 2021-03-23 ENCOUNTER — TELEPHONE (OUTPATIENT)
Dept: BEHAVIORAL HEALTH | Facility: CLINIC | Age: 36
End: 2021-03-23

## 2021-03-23 PROCEDURE — H0035 MH PARTIAL HOSP TX UNDER 24H: HCPCS | Mod: 95 | Performed by: COUNSELOR

## 2021-03-23 PROCEDURE — H0035 MH PARTIAL HOSP TX UNDER 24H: HCPCS | Mod: GT

## 2021-03-23 PROCEDURE — H0035 MH PARTIAL HOSP TX UNDER 24H: HCPCS | Mod: 95 | Performed by: OCCUPATIONAL THERAPIST

## 2021-03-23 PROCEDURE — H0035 MH PARTIAL HOSP TX UNDER 24H: HCPCS | Mod: 95

## 2021-03-23 NOTE — GROUP NOTE
Process Group Note    PATIENT'S NAME: Mariusz Huntley  MRN:   [id number]  :   1985  ACCT. NUMBER: [id number]  DATE OF SERVICE: 3/23/21  START TIME:  1:00 PM  END TIME:  1:50 PM  FACILITATOR: Zuri Armando [facility]; Aj Anaya [facility]  TOPIC:  Process Group    Diagnoses:  Axis I:     1.  Major depressive disorder, recurrent.     2.  Generalized anxiety disorder.     3.  Posttraumatic stress disorder.   4.  Cannabis use disorder.     5.  Alcohol use disorder, in remission.   Axis II:  Deferred.   Axis III:  History of concussion 2020.       [facility] Adult Partial Hospitalization Program  TRACK: [facility]    NUMBER OF PARTICIPANTS: 7    Telemedicine Visit: The patient's condition can be safely assessed and treated via synchronous audio and visual telemedicine encounter.      Reason for Telemedicine Visit: Services only offered telehealth and due to COVID-19    Originating Site (Patient Location): Patient's home    Distant Site (Provider Location): Provider Remote Setting    Consent:  The patient/guardian has verbally consented to: the potential risks and benefits of telemedicine (video visit) versus in person care; bill my insurance or make self-payment for services provided; and responsibility for payment of non-covered services.     Mode of Communication:  Video Conference via Zoom    As the provider I attest to compliance with applicable laws and regulations related to telemedicine.            Data:    Session content: At the start of this group, patients were invited to check in by identifying themselves, describing their current emotional status, and identifying issues to address in this group.   Area(s) of treatment focus addressed in this session included Symptom Management, Personal Safety and Community Resources/Discharge Planning.    Patient reported feeling anxious. Patient discussed working toward continuing self-compassion. Patient identified self-compassion as skills they will  use to address their goal(s). Patient reported flashbacks may be a barrier to working toward their goal(s) and/or addressing mental health symptoms. Patient reported no safety concerns and/or self-injurious behaviors. Patient reported no substance use. Patient reported they are taking their medications as prescribed. Patient reported feeling proud that they spent time with family. Patient discussed her nightmares with the treatment group.     Therapeutic Interventions/Treatment Strategies:  Psychotherapist offered support, feedback and validation and reinforced use of skills. Treatment modalities used include Motivational Interviewing and Cognitive Behavioral Therapy. Interventions include Coping Skills: Assisted patient in identifying 1-2 healthy distraction skills to reduce overall distress, Symptoms Management: Promoted understanding of their diagnoses and how it impacts their functioning and Emotions Management:  Discussed barriers to emotional regulation.    Assessment:    Patient response:   Patient responded to session by accepting feedback, giving feedback, listening, focusing on goals, being attentive and accepting support    Possible barriers to participation / learning include: and no barriers identified    Health Issues:   None reported       Substance Use Review:   Substance Use: No active concerns identified.    Mental Status/Behavioral Observations  Appearance:   Appropriate   Eye Contact:   Good   Psychomotor Behavior: Normal   Attitude:   Cooperative   Orientation:   All  Speech   Rate / Production: Normal/ Responsive Normal    Volume:  Normal   Mood:    Anxious  Depressed  Normal  Affect:    Appropriate   Thought Content:   Clear and Safety denies any current safety concerns including suicidal ideation, self-harm, and homicidal ideation  Thought Form:  Coherent  Logical     Insight:    Good     Plan:     Safety Plan: No current safety concerns identified.  Recommended that patient call 911 or go to  the local ED should there be a change in any of these risk factors.     Barriers to treatment: None identified    Patient Contracts (see media tab):  None    Substance Use: Provided encouragement towards sobriety     Continue or Discharge: Patient will continue in Adult Partial Hospitalization Program (PHP)  as planned. Patient is likely to benefit from learning and using skills as they work toward the goals identified in their treatment plan.      Zuri Armando, [facility]  March 23, 2021

## 2021-03-23 NOTE — GROUP NOTE
Psychoeducation Group Note    PATIENT'S NAME: Mariusz Huntley  MRN:   [id number]  :   1985  ACCT. NUMBER: [id number]  DATE OF SERVICE: 3/23/21  START TIME: 10:00 AM  END TIME: 10:50 AM  FACILITATOR: Raman Guerra OT  TOPIC: [facility] OT Group: Self- Regulation Skills  [facility] Adult Partial Hospitalization Program  TRACK: 1    Telemedicine Visit: The patient's condition can be safely assessed and treated via synchronous audio and visual telemedicine encounter.      Reason for Telemedicine Visit: Services only offered telehealth and Covid 19    Originating Site (Patient Location): Patient's home    Distant Site (Provider Location): [facility] Hospital: [facility] ER Building: Outpatient [facility]    Consent:  The patient/guardian has verbally consented to: the potential risks and benefits of telemedicine (video visit) versus in person care; bill my insurance or make self-payment for services provided; and responsibility for payment of non-covered services.     Mode of Communication:  Video Conference via Medical Zoom    As the provider I attest to compliance with applicable laws and regulations related to telemedicine.      NUMBER OF PARTICIPANTS: 7    Summary of Group / Topics Discussed:  Occupational Therapy: Self-Regulation Skills: Window of Tolerance 1: Patients were provided with education on Autonomic Nervous System activation and the importance of identifying their Window of Tolerance for effective self-regulation.  Patients were taught how to recognize specific signs and symptoms of their individualized state of arousal and how to make changes when needed.  Patient's explored body based skills (bottom up processing skills) and techniques to help manage symptoms and change level of arousal when needed to be in control and comfortable so they are able to function in different environments.         Patient Session Goals / Objectives:    [place]  how the ANS works and importance of its  impact on functioning and mental wellbeing    [place] how developing interoceptive awareness can help one self-regulate sooner rather than later    [place] the importance of self awareness around NS activation level in being able to self regulate in context.     Explored the different states of ANS activation and began to apply new learning to their own experience.         Patient Participation / Response:  Fully participated with the group by sharing personal reflections / insights and openly received / provided feedback with other participants.    Verbalized understanding of content and Patient would benefit from additional opportunities to practice the content to be able to generalize it to their everyday life with increased intentionality, consistency, and efficacy in support of their psychiatric recovery    Treatment Plan:  Patient has a current master individualized treatment plan.  See Epic treatment plan for more information.    Raman Guerra, OT

## 2021-03-23 NOTE — GROUP NOTE
Psychoeducation Group Note    PATIENT'S NAME: Mariusz Huntley  MRN:   [id number]  :   1985  ACCT. NUMBER: [id number]  DATE OF SERVICE: 3/22/21  START TIME:  1:00 PM  END TIME:  1:50 PM  FACILITATOR: Sheila Henderson OTR/L  TOPIC: [facility] OT Group: Lifestyle Balance and Structure  [facility] Adult Partial Hospitalization Program  TRACK: 1    NUMBER OF PARTICIPANTS: 5    Telemedicine Visit: The patient's condition can be safely assessed and treated via synchronous audio and visual telemedicine encounter.      Reason for Telemedicine Visit: Services only offered telehealth    Originating Site (Patient Location): Patient's home    Distant Site (Provider Location): [facility] Outpatient Setting: [facility]    Consent:  The patient/guardian has verbally consented to: the potential risks and benefits of telemedicine (video visit) versus in person care; bill my insurance or make self-payment for services provided; and responsibility for payment of non-covered services.     Mode of Communication:  Video Conference via zoom    As the provider I attest to compliance with applicable laws and regulations related to telemedicine.     Summary of Group / Topics Discussed:  Aftercare Planning/Transitions:  Facilitated a discussion around the concept of recognizing, honoring, and coping with transitions in their lives and the impact their mental health symptoms may have on this.  Assisted patients in beginning to look at resources and needs once they have completed the Partial Program for ongoing care and support.  Facilitated discussion on practical application of skill usage in various community activities and problem solved barriers.           Patient Session Goals / Objectives:    To identify current transitions in their lives that are currently experiencing    To assist with a smooth transition to the next level of care with needed resources and support    To assist with the application  of skills taught in the program to everyday life, planning and problem solving as needed       Patient Participation / Response:  Fully participated with the group by sharing personal reflections / insights and openly received / provided feedback with other participants.    Patient presentation: congruent affect; active in group discussion sharing ideas/examples, Verbalized understanding of content and Patient would benefit from additional opportunities to practice the content to be able to generalize it to their everyday life with increased intentionality, consistency, and efficacy in support of their psychiatric recovery    Treatment Plan:  Patient has a current master individualized treatment plan.  See Epic treatment plan for more information.    Sheila Henderson, OTR/L

## 2021-03-23 NOTE — GROUP NOTE
Psychoeducation Group Note    PATIENT'S NAME: Mariusz Huntley  MRN:   [id number]  :   1985  ACCT. NUMBER: [id number]  DATE OF SERVICE: 3/23/21  START TIME:  9:00 AM  END TIME:  9:50 AM  FACILITATOR: Sheila Henderson OTR/L  TOPIC: [facility] OT Group: Lifestyle Balance and Structure  [facility] Adult Partial Hospitalization Program  TRACK: 1    NUMBER OF PARTICIPANTS: 6    Telemedicine Visit: The patient's condition can be safely assessed and treated via synchronous audio and visual telemedicine encounter.      Reason for Telemedicine Visit: Services only offered telehealth    Originating Site (Patient Location): Patient's home    Distant Site (Provider Location): [facility]    Consent:  The patient/guardian has verbally consented to: the potential risks and benefits of telemedicine (video visit) versus in person care; bill my insurance or make self-payment for services provided; and responsibility for payment of non-covered services.     Mode of Communication:  Video Conference via Zoom    As the provider I attest to compliance with applicable laws and regulations related to telemedicine.     Summary of Group / Topics Discussed: Motivation and Procrastination  Focus on the group is to support patients in identifying barriers to task completion, challenge negative self talk, and how their mental health symptoms impact this.  Provided psychoeducation and helped patients identify skills they may employ to assist with task engagement and follow through.  Facilitated supportive discussion providing validation and support.     Patient Session Goals / Objectives:    Patient will be able to better identify barriers to task completion and how their mental health symptoms impact this    Patients will identify 1-2 ways they can challenge these barriers to help with task completion       Patient Participation / Response:  Fully participated  with the group by sharing personal reflections / insights and openly received / provided feedback with other participants.    Patient presentation: constricted affect; active in group discussion sharing insights and asking questions, Verbalized understanding of content and Patient would benefit from additional opportunities to practice the content to be able to generalize it to their everyday life with increased intentionality, consistency, and efficacy in support of their psychiatric recovery    Treatment Plan:  Patient has a current master individualized treatment plan and today was our weekly review of the patient's progress.  See Epic treatment plan for progress / updates on goals and plan.    Sheila Henderson, OTR/L

## 2021-03-23 NOTE — GROUP NOTE
Psychotherapy Group Note    PATIENT'S NAME: Mariusz Huntley  MRN:   [id number]  :   1985  ACCT. NUMBER: [id number]  DATE OF SERVICE: 3/23/21  START TIME:  2:00 PM  END TIME:  2:50 PM  FACILITATOR: Zuri Armando LPCC  TOPIC: MH EBP Group: Social Support  [facility] Adult Partial Hospitalization Program  TRACK: [facility]    NUMBER OF PARTICIPANTS: 7    Summary of Group / Topics Discussed:  Social Support:  Building and educating social support systems: The patients engaged in a discussion of how their experience of mental health symptoms are related to psychosocial impairment. The patients also shared experiences of when stigma associated with mental illness has created barriers between vulnerability and validation from social network. The patients benefitted from this group by exploring ways in which they can re-engage with social network to decrease feelings of isolation and loneliness.    Patient Session Goals / Objectives:    Reduce overidentification/fusion with mental illness as being a primary definer of identity.     Identify ways that support network can assist with recovery.     Reduce stigma associated with mental health diagnosis(es).    Improve interpersonal communication regarding symptoms    Build a sense of mastery and self-respect      Telemedicine Visit: The patient's condition can be safely assessed and treated via synchronous audio and visual telemedicine encounter.      Reason for Telemedicine Visit: Services only offered telehealth and due to COVID-19    Originating Site (Patient Location): Patient's home    Distant Site (Provider Location): Provider Remote Setting    Consent:  The patient/guardian has verbally consented to: the potential risks and benefits of telemedicine (video visit) versus in person care; bill my insurance or make self-payment for services provided; and responsibility for payment of non-covered services.     Mode of Communication:  Video Conference via  Zoom    As the provider I attest to compliance with applicable laws and regulations related to telemedicine.        Patient Participation / Response:  Fully participated with the group by sharing personal reflections / insights and openly received / provided feedback with other participants.      Treatment Plan:  Patient has a current master individualized treatment plan and today was our weekly review of the patient's progress.  See Epic treatment plan for progress / updates on goals and plan.    Zuri Armando, [facility]

## 2021-03-23 NOTE — GROUP NOTE
Psychoeducation Group Note    PATIENT'S NAME: Mariusz Huntley  MRN:   [id number]  :   1985  ACCT. NUMBER: [id number]  DATE OF SERVICE: 3/23/21  START TIME: 11:00 AM  END TIME: 11:50 AM  FACILITATOR: Tomeka Teixeira RN  TOPIC: MARIA ESTHER RN Group: [facility] Adult Partial Hospitalization Program  TRACK: 1    NUMBER OF PARTICIPANTS: 7    Summary of Group / Topics Discussed:    [facility]: Self Compassion-  Patients will watch 15 minute [place] talk by Dr Elba Velasquez.  A brief review of research on self compassion and benefits to mental health will be discussed. We will contrast benefits and pitfalls of self esteem. 3 core concepts of self compassion will be described and discussed.    Pts will complete a self compassion exercise and generate a list of self compassion statements  Pts will express benefits to using self compassion to relate to inner selves              Patient Participation / Response:  Fully participated with the group by sharing personal reflections / insights and openly received / provided feedback with other participants.    Demonstrated understanding of topics discussed through group discussion and participation    Treatment Plan:  Patient has a current master individualized treatment plan.  See Epic treatment plan for more information.    Tomeka Teixeira RN

## 2021-03-23 NOTE — PROGRESS NOTES
Acknowledgement of Current Treatment Plan       I have reviewed my treatment plan with my therapist / counselor on 3/23/21.   I agree with the plan as it is written in the electronic health record.    Name:      Signature:  Mariusz Huntley Unable to sign due to COVID-19     Dr. Archie Villa MD  Psychiatrist    Karine Mallory, MS, [facility], BC-DMT  Psychotherapist     Zuri Armando MA, [facility]  Psychotherapist  ALEJANDRO Mercer on 3/23/2021 at 9:30 AM

## 2021-03-24 ENCOUNTER — HOSPITAL ENCOUNTER (OUTPATIENT)
Dept: BEHAVIORAL HEALTH | Facility: CLINIC | Age: 36
End: 2021-03-24
Attending: PSYCHIATRY & NEUROLOGY
Payer: COMMERCIAL

## 2021-03-24 PROCEDURE — H0035 MH PARTIAL HOSP TX UNDER 24H: HCPCS | Mod: 95

## 2021-03-24 PROCEDURE — H0035 MH PARTIAL HOSP TX UNDER 24H: HCPCS | Mod: 95 | Performed by: OCCUPATIONAL THERAPIST

## 2021-03-24 PROCEDURE — H0035 MH PARTIAL HOSP TX UNDER 24H: HCPCS | Mod: GT | Performed by: COUNSELOR

## 2021-03-24 PROCEDURE — H0035 MH PARTIAL HOSP TX UNDER 24H: HCPCS | Mod: GT | Performed by: SOCIAL WORKER

## 2021-03-24 NOTE — GROUP NOTE
Psychoeducation Group Note    PATIENT'S NAME: Mariusz Huntley  MRN:   [id number]  :   1985  ACCT. NUMBER: [id number]  DATE OF SERVICE: 3/24/21  START TIME: 11:00 AM  END TIME: 11:50 AM  FACILITATOR: Sheila Henderson OTR/L  TOPIC: [facility] OT Group: Lifestyle Balance and Structure  [facility] Adult Partial Hospitalization Program  TRACK: 1    NUMBER OF PARTICIPANTS: 4    Telemedicine Visit: The patient's condition can be safely assessed and treated via synchronous audio and visual telemedicine encounter.      Reason for Telemedicine Visit: Services only offered telehealth    Originating Site (Patient Location): Patient's home    Distant Site (Provider Location): [facility]    Consent:  The patient/guardian has verbally consented to: the potential risks and benefits of telemedicine (video visit) versus in person care; bill my insurance or make self-payment for services provided; and responsibility for payment of non-covered services.     Mode of Communication:  Video Conference via Zoom     As the provider I attest to compliance with applicable laws and regulations related to telemedicine.     Summary of Group / Topics Discussed:  Lifestyle Balance and Structure:  Leisure Values: Provided psychoeducation and discussion on benefits of leisure on stress management, parasympathetic NS activation, and wellness. Facilitated a discussion where patients identified their leisure values: what is most important to them with regards to how they spend their time and energy on that promotes lifestyle balance to support mental wellbeing. Experiential process facilitated where patients reflected on past, present, and potential future leisure activities that fulfill their leisure values. Validation and support provided.    Patient Session Goals / Objectives:    [place]rier the mechanisms and benefits of leisure activity to create lifestyle balance and improve mental  health.     Identified their leisure values (how they want to spend their time and energy)    Identified past, present, and future leisure activities that demonstrate a connection to their leisure values    Identify first step to engaging in a new or old leisure activity again and problem solve barriers.         Patient Participation / Response:  Fully participated with the group by sharing personal reflections / insights and openly received / provided feedback with other participants.    Patient presentation: congruent mood; able to reflect on progress since starting the program especially in regards to leisure engagement/participation; active in discussion, Verbalized understanding of content and Patient would benefit from additional opportunities to practice the content to be able to generalize it to their everyday life with increased intentionality, consistency, and efficacy in support of their psychiatric recovery    Treatment Plan:  Patient has a current master individualized treatment plan.  See Epic treatment plan for more information.    Sheila Henderson, OTR/L

## 2021-03-24 NOTE — GROUP NOTE
Psychotherapy Group Note    PATIENT'S NAME: Mariusz Huntley  MRN:   [id number]  :   1985  ACCT. NUMBER: [id number]  DATE OF SERVICE: 3/24/21  START TIME:  1:00 PM  END TIME:  1:50 PM  FACILITATOR: Karine Mallory LICSW  TOPIC:  EBP Group: Enhanced Mindfulness  [facility] Adult Partial Hospitalization Program  TRACK: 1    NUMBER OF PARTICIPANTS: 4    Summary of Group / Topics Discussed:  Enhanced Mindfulness: Body and Mind Integration: Patients received an overview and education regarding the importance of including the body in the management of emotional health and self-care and as a direct route to mindfulness practice.  Patients discussed various ways in which the body can serve as an informant to their physical and emotional experiences/need. Patients discussed the body as a direct link to the present moment and to mindfulness practice.  Patients discussed current relationship with body, self-awareness, mindfulness practice and barriers to connection with body.  Patients were guided through breath work and movement to facilitate greater self-awareness, grounding, self-expression, and connection to other.  Patients discussed how the experiential could be applied to better manage mental health and develop joe connection to self.    Patient Session Goals / Objectives:    Identify how movement awareness could be used for grounding, stress management, self-expression, connection to other and self-regulation    Improved awareness of breath and movement preferences    Identify how movement and the body is used in mindfulness practice    Reflect on use of these practices in everyday life.    Identify barriers to attending to body    Telemedicine Visit: The patient's condition can be safely assessed and treated via synchronous audio and visual telemedicine encounter.          Reason for Telemedicine Visit: Services only offered telehealth and covid19        Originating Site (Patient Location):  Patient's home        Distant Site (Provider Location): Provider Remote Setting        Consent:  The patient/guardian has verbally consented to: the potential risks and benefits of telemedicine (video visit) versus in person care; bill my insurance or make self-payment for services provided; and responsibility for payment of non-covered services.         Mode of Communication:  Video Conference via Diavibe        As the provider I attest to compliance with applicable laws and regulations related to telemedicine.         Patient Participation / Response:  Fully participated with the group by sharing personal reflections / insights and openly received / provided feedback with other participants.    Demonstrated understanding of topics discussed through group discussion and participation and Practiced skills in session    Treatment Plan:  Patient has a current master individualized treatment plan.  See Epic treatment plan for more information.    ESME ManleySW

## 2021-03-24 NOTE — GROUP NOTE
"Process Group Note    PATIENT'S NAME: Mariusz Huntley  MRN:   [id number]  :   1985  ACCT. NUMBER: [id number]  DATE OF SERVICE: 3/24/21  START TIME: 10:00 AM  END TIME: 10:50 AM  FACILITATOR: Zuri Armando LPCC  TOPIC:  Process Group    Diagnoses:  Axis I:     1.  Major depressive disorder, recurrent.     2.  Generalized anxiety disorder.     3.  Posttraumatic stress disorder.   4.  Cannabis use disorder.     5.  Alcohol use disorder, in remission.   Axis II:  Deferred.   Axis III:  History of concussion 2020.       [facility] Adult Partial Hospitalization Program  TRACK: [facility]    NUMBER OF PARTICIPANTS: 4    Telemedicine Visit: The patient's condition can be safely assessed and treated via synchronous audio and visual telemedicine encounter.      Reason for Telemedicine Visit: Services only offered telehealth and due to COVID-19    Originating Site (Patient Location): Patient's home    Distant Site (Provider Location): Provider Remote Setting    Consent:  The patient/guardian has verbally consented to: the potential risks and benefits of telemedicine (video visit) versus in person care; bill my insurance or make self-payment for services provided; and responsibility for payment of non-covered services.     Mode of Communication:  Video Conference via Zoom    As the provider I attest to compliance with applicable laws and regulations related to telemedicine.            Data:    Session content: At the start of this group, patients were invited to check in by identifying themselves, describing their current emotional status, and identifying issues to address in this group.   Area(s) of treatment focus addressed in this session included Symptom Management, Personal Safety and Community Resources/Discharge Planning.    Patient reported feeling anxious and \"on edge\" today. Patient discussed working toward decreasing his anxiety. Patient identified using a dog as his support system as skills " they will use to address their goal(s). Patient reported racing thoughts may be a barrier to working toward their goal(s) and/or addressing mental health symptoms. Patient reported no safety concerns and/or self-injurious behaviors. Patient reported no substance use. Patient reported they are taking their medications as prescribed. Patient reported feeling proud that they went for a walk yesterday. Patient discussed a bad dream with the treatment group.     Therapeutic Interventions/Treatment Strategies:  Psychotherapist offered support, feedback and validation and reinforced use of skills. Treatment modalities used include Motivational Interviewing and Cognitive Behavioral Therapy. Interventions include Coping Skills: Assisted patient in identifying 1-2 healthy distraction skills to reduce overall distress, Symptoms Management: Promoted understanding of their diagnoses and how it impacts their functioning and Emotions Management:  Discussed barriers to emotional regulation.    Assessment:    Patient response:   Patient responded to session by accepting feedback, giving feedback, listening, focusing on goals, being attentive and accepting support    Possible barriers to participation / learning include: and no barriers identified    Health Issues:   None reported       Substance Use Review:   Substance Use: No active concerns identified.    Mental Status/Behavioral Observations  Appearance:   Appropriate   Eye Contact:   Good   Psychomotor Behavior: Normal   Attitude:   Cooperative   Orientation:   All  Speech   Rate / Production: Normal/ Responsive Normal    Volume:  Normal   Mood:    Anxious  Depressed  Normal  Affect:    Appropriate   Thought Content:   Clear and Safety denies any current safety concerns including suicidal ideation, self-harm, and homicidal ideation  Thought Form:  Coherent  Logical     Insight:    Good     Plan:     Safety Plan: No current safety concerns identified.  Recommended that patient  call 911 or go to the local ED should there be a change in any of these risk factors.     Barriers to treatment: None identified    Patient Contracts (see media tab):  None    Substance Use: Provided encouragement towards sobriety     Continue or Discharge: Patient will continue in Adult Partial Hospitalization Program (PHP)  as planned. Patient is likely to benefit from learning and using skills as they work toward the goals identified in their treatment plan.      Zuri Armando, [facility]  March 24, 2021

## 2021-03-24 NOTE — GROUP NOTE
Psychoeducation Group Note    PATIENT'S NAME: Mariusz Huntley  MRN:   [id number]  :   1985  ACCT. NUMBER: [id number]  DATE OF SERVICE: 3/24/21  START TIME:  9:00 AM  END TIME:  9:50 AM  FACILITATOR: Tomeka Teixeira RN  TOPIC: MARIA ESTHER RN Group: [facility] Adult Partial Hospitalization Program  TRACK: 1    NUMBER OF PARTICIPANTS: 4    Summary of Group / Topics Discussed:  Foundations of Health: Nutrition: Super Nutrients & Micronutrients: Super Nutrients are Foods that have high nutritional yield. Micronutrients are essential elements found in food or taken through supplements that are necessary for normal physiological functioning. This group was designed to complement the macronutrients group and build upon previous education. The changes that food makes on the brain (how the brain uses sugar) and nutrition as it relates to mental health was also discussed.       Patient Session Goals / Objectives:  ? Identified the health enhancing benefits to good nutrition  ? Verbalized ways in which the food we eat affects the brain  ? Explained the role of micronutrients in the body, how much we need, and how to get it  Telemedicine Visit: The patient's condition can be safely assessed and treated via synchronous audio and visual telemedicine encounter.      Reason for Telemedicine Visit: Services only offered telehealth    Originating Site (Patient Location): Patient's home    Distant Site (Provider Location): Provider Remote Setting    Consent:  The patient/guardian has verbally consented to: the potential risks and benefits of telemedicine (video visit) versus in person care; bill my insurance or make self-payment for services provided; and responsibility for payment of non-covered services.     Mode of Communication:  Video Conference via [employer]    As the provider I attest to compliance with applicable laws and regulations related to telemedicine.       Patient Participation /  Response:  Fully participated with the group by sharing personal reflections / insights and openly received / provided feedback with other participants.    Demonstrated understanding of topics discussed through group discussion and participation    Treatment Plan:  Patient has a current master individualized treatment plan.  See Epic treatment plan for more information.    Tomeka Texieira RN

## 2021-03-24 NOTE — GROUP NOTE
Psychoeducation Group Note    PATIENT'S NAME: Mariusz Huntley  MRN:   [id number]  :   1985  ACCT. NUMBER: [id number]  DATE OF SERVICE: 3/24/21  START TIME:  2:00 PM  END TIME:  2:50 PM  FACILITATOR: Raman Guerra OT  TOPIC: [facility] OT Group: Self- Regulation Skills  [facility] Adult Partial Hospitalization Program  TRACK: 1    Telemedicine Visit: The patient's condition can be safely assessed and treated via synchronous audio and visual telemedicine encounter.      Reason for Telemedicine Visit: Services only offered telehealth and Covid 19    Originating Site (Patient Location): Patient's home    Distant Site (Provider Location): [facility]: [facility] Building: [facility]    Consent:  The patient/guardian has verbally consented to: the potential risks and benefits of telemedicine (video visit) versus in person care; bill my insurance or make self-payment for services provided; and responsibility for payment of non-covered services.     Mode of Communication:  Video Conference via Medical Zoom    As the provider I attest to compliance with applicable laws and regulations related to telemedicine.      NUMBER OF PARTICIPANTS: 4    Summary of Group / Topics Discussed:  Self-Regulation Skills: Window of Tolerance & Self Regulation Plan 2: Patients were provided with education on Autonomic Nervous System activation and the importance of identifying their Window of Tolerance for effective self-regulation.  Patients were taught how to recognize specific signs and symptoms of their individualized state of arousal and how to make changes when needed.  Patient's explored body based skills (bottom up processing skills) and techniques to help manage symptoms and change level of arousal when needed to be in control and comfortable so they are able to function in different environments.         Patient Session Goals / Objectives:    [place]  how the ANS works and importance of its  impact on functioning and mental wellbeing    [place] how developing interoceptive awareness can help one self-regulate sooner rather than later    Identified signs and symptoms of current level of arousal and ways to make changes in level of arousal when needed    Identified specific and individualized neurosensory skills to help when distressed and for crisis management    Began the creation of a  plan for practice of these skills in their own environments        Patient Participation / Response:  Fully participated with the group by sharing personal reflections / insights and openly received / provided feedback with other participants.    Verbalized understanding of content and Patient would benefit from additional opportunities to practice the content to be able to generalize it to their everyday life with increased intentionality, consistency, and efficacy in support of their psychiatric recovery    Treatment Plan:  Patient has a current master individualized treatment plan.  See Epic treatment plan for more information.    Raman Guerra, OT

## 2021-03-24 NOTE — PROGRESS NOTES
"[facility]   Dr. Villa's Psychiatric Progress Note  2021      Patient:  Mariusz Huntley   Medical Record Number:  [id number]  :  1985    Telemedicine Visit: The patient's condition can be safely assessed and treated via synchronous audio and visual telemedicine encounter.       Reason for Telemedicine Visit: COVID-19 lockdown     Originating Site (Patient Location):  HOME     Distant Site (Provider Location): [facility]: [place]     Consent:  The patient/guardian has verbally consented to: the potential risks and benefits of telemedicine (video visit) versus in person care; bill my insurance or make self-payment for services provided; and responsibility for payment of non-covered services.        Interim History:   The patient's care was discussed with the treatment team and chart notes were reviewed.    3/15/2:  VPA was started on 3/12/21.  Weekend was ok.  She was down most of the weekend, even though she had her kids.  Emotionally rough weekend.      3/17/21:  Pt has been very anxious.  Depression is 50% as bad as anxiety.      3/19/21:  Mood is better today.  Had nightmares and flashbacks Wed. Night. Th. Was bad.  Going out Sat. To eat tacos in [place] and going to [employer].       3/22/21:  Weekend was good.  Spent lots of time with  and kids.  Lots of time outside.      3/24/21:  Today is going ok.      Psychiatric ROS:  Mood: \"up and happier\" but still has some anxiety.  Improving.    Sleep:  Having weird dreams: dreamt last night she was confrontational at a bar and trying to get into fights.     She got about 6 hours last night.  She used Trazodone to 150mg.   Appetite:  Pretty good  Eating: ate lots of pizza yesterday and ice cream  Energy Level:   good  Concentration/Memory: pretty good;  Taking better notes  Suicidal Thoughts:No  Homicidal Thoughts:No  Psychotic Symptoms: No  Medication Side " Effects:No  Medication Compliance:Yes   Physical Complaints:  none         Medications:     PAST PSYCH MEDS:  Depakote ER, Wellbutrin, Zoloft, Lexapro, Neurontin, Vistaril, trazodone and Ritalin.    Current Outpatient Medications   Medication Sig     acetaminophen (TYLENOL) 325 MG tablet Take 2 tablets (650 mg) by mouth every 4 hours as needed for mild pain (to moderate pain)                 divalproex sodium extended-release (DEPAKOTE ER) 250 MG 24 hr tablet Take 1 po at bedtime x 2 days then 2 po at bedtime x 2 days then 3 po at bedtime x 2 days then 4 po qHS     escitalopram (LEXAPRO) 10 MG tablet Take 1 tablet (10 mg) by mouth daily     gabapentin (NEURONTIN) 600 MG tablet Take 800 mg by mouth 3 times daily            hydrOXYzine (ATARAX) 50 MG tablet 100mg bid     ibuprofen (ADVIL/MOTRIN) 600 MG tablet Take 1 tablet (600 mg) by mouth every 6 hours as needed for moderate pain     LORazepam (ATIVAN) 2 MG tablet Take 2 mg by mouth every 6 hours as needed for anxiety Reports 10 mg ??     melatonin 3 MG tablet Take 1-2 tablets (3-6 mg) by mouth nightly as needed for sleep     methyl salicylate-menthol (TONY-OCAMPO) OINT ointment Apply 1 g topically every 4 hours as needed (pain, apply to back)     Multiple Vitamins-Minerals (MULTIVITAMIN GUMMIES ADULT PO) Take 4 tablets by mouth daily                 propranolol (INDERAL) 40 MG tablet Take 1 tablet (40 mg) by mouth 2 times daily     senna-docusate (SENOKOT-S/PERICOLACE) 8.6-50 MG tablet Take 1 tablet by mouth 2 times daily as needed for constipation     traZODone (DESYREL) 100 MG tablet Take 150 mg to 200mg by mouth At Bedtime           Vitamin D3 (CHOLECALCIFEROL) 25 mcg (1000 units) tablet Take 2 tablets (50 mcg) by mouth daily     No current facility-administered medications for this encounter.              Allergies:     Allergies   Allergen Reactions     Honeybees [Bees] Anaphylaxis     Sulfamethoxazole-Trimethoprim Nausea and Vomiting     Codeine      Sulfa Drugs       Amoxicillin Rash     Codeine Rash            Psychiatric Examination:   There were no vitals taken for this visit.  Weight is 0 lbs 0 oz  There is no height or weight on file to calculate BMI.    Appearance:  awake, alert and adequately groomed  Attitude:  cooperative  Eye Contact:  good  Mood:  Less down and less anxious  Affect:  mood congruent  Speech:  clear, coherent  Psychomotor Behavior:  no evidence of tardive dyskinesia, dystonia, or tics  Throught Process:  logical  Associations:  no loose associations  Thought Content:  no evidence of suicidal ideation or homicidal ideation and no evidence of psychotic thought  Insight:  good  Judgement:  intact  Oriented to:  time, person, and place  Attention Span and Concentration:  intact  Recent and Remote Memory:  intact  Gait:Normal    Risk/Potential for Dangerousness:  Multiple Active Diagnoses:HIGH  Self Care:HIGH  Suicide:LOW  Assault:LOW  Self Injurious Behaviors:LOW  Inappropriate Sexual Behavior:LOW         Labs:   No results found for this or any previous visit (from the past 24 hour(s)).     No results found for this or any previous visit (from the past 1008 hour(s)).      Impression:   This is a 35 year old female continues PHP for mood stabilization.  Mood is improving.  Groups are going well.           DIagnoses:     Axis I:     1.  Major depressive disorder, recurrent.     2.  Generalized anxiety disorder.     3.  Posttraumatic stress disorder.   4.  Cannabis use disorder.     5.  Alcohol use disorder, in remission.   Axis II:  Deferred.   Axis III:  History of concussion 02/2020.            Plan:     Complete [facility] on 3/26/21.    Started Depakote ER and titrated to 1000 mg at bedtime.   Will order labs once she reaches steady state at [facility]: VPA level, LFT's and CBC.    Increased Trazodone from 100mg to 150 or 200mg at bedtime prn.   Will likely taper off  some of the medications which she feels are not helping.   Expect stabilization and completion of Partial Hospital Program.   Follow up with current outpatient medication prescriber and therapist.     Archie Villa MD

## 2021-03-25 ENCOUNTER — HOSPITAL ENCOUNTER (OUTPATIENT)
Dept: BEHAVIORAL HEALTH | Facility: CLINIC | Age: 36
End: 2021-03-25
Attending: PSYCHIATRY & NEUROLOGY
Payer: COMMERCIAL

## 2021-03-25 DIAGNOSIS — F43.10 PTSD (POST-TRAUMATIC STRESS DISORDER): ICD-10-CM

## 2021-03-25 DIAGNOSIS — F33.1 MAJOR DEPRESSIVE DISORDER, RECURRENT EPISODE, MODERATE (H): ICD-10-CM

## 2021-03-25 PROCEDURE — H0035 MH PARTIAL HOSP TX UNDER 24H: HCPCS | Mod: 95 | Performed by: COUNSELOR

## 2021-03-25 PROCEDURE — H0035 MH PARTIAL HOSP TX UNDER 24H: HCPCS | Mod: GT

## 2021-03-25 NOTE — GROUP NOTE
Psychotherapy Group Note    PATIENT'S NAME: Mariusz Huntley  MRN:   [id number]  :   1985  ACCT. NUMBER: [id number]  DATE OF SERVICE: 3/25/21  START TIME:  2:00 PM  END TIME:  2:50 PM  FACILITATOR: Zuri Armando LPCC  TOPIC: MH EBP Group: Relationship Skills  [facility] Adult Partial Hospitalization Program  TRACK: [facility]    NUMBER OF PARTICIPANTS: 5    Summary of Group / Topics Discussed:  Relationship Skills: Boundaries: Patients were provided with a general overview of interpersonal boundaries and how lack of boundaries relates to symptoms and functioning. The purpose is to help patients identify boundary issues and gain awareness and skills to work towards healthier interpersonal boundaries. Current awareness of healthy boundary characteristics and barriers to establishing healthy boundaries were discussed.    Patient Session Goals / Objectives:    Familiarized patients with the concept of interpersonal boundaries and their characteristics    Discussed and practiced strategies to promote healthier interpersonal boundaries    Identified boundary issues and identified plan to improve boundaries    Telemedicine Visit: The patient's condition can be safely assessed and treated via synchronous audio and visual telemedicine encounter.      Reason for Telemedicine Visit: Services only offered telehealth and due to COVID-19    Originating Site (Patient Location): Patient's home    Distant Site (Provider Location): Provider Remote Setting    Consent:  The patient/guardian has verbally consented to: the potential risks and benefits of telemedicine (video visit) versus in person care; bill my insurance or make self-payment for services provided; and responsibility for payment of non-covered services.     Mode of Communication:  Video Conference via Zoom    As the provider I attest to compliance with applicable laws and regulations related to telemedicine.        Patient Participation /  Response:  Fully participated with the group by sharing personal reflections / insights and openly received / provided feedback with other participants.    Demonstrated understanding of topics discussed through group discussion and participation, Demonstrated understanding of relationship skills and communication skills and Identified / Expressed personal readiness to incorporate effective communication skills    Treatment Plan:  Patient has a current master individualized treatment plan.  See Epic treatment plan for more information.    Zuri Armando, LPCC

## 2021-03-25 NOTE — GROUP NOTE
Psychoeducation Group Note    PATIENT'S NAME: Mariusz Huntley  MRN:   [id number]  :   1985  ACCT. NUMBER: [id number]  DATE OF SERVICE: 3/25/21  START TIME:  1:00 PM  END TIME:  1:50 PM  FACILITATOR: Tomeka Teixeira RN  TOPIC: MH RN Group: Brain Health  [facility] Adult Partial Hospitalization Program  TRACK: 1    NUMBER OF PARTICIPANTS: 5    Summary of Group / Topics Discussed:  Brain Health:  Pathophysiology of Mood Disorders: Patients were educated on mood disorder etiology and neuroscience, risk factors, symptoms, and pharmacologic, psychotherapeutic, and complementary treatment options. Patients were guided on a discussion of mental, behavioral, and physical symptoms and shared their symptoms with the group.     Patient Session Goals / Objectives:  ? Described what mood disorders are and identified risk factors   ? Explained how chemical imbalances in the brain can cause symptoms and how medications work to reverse this imbalance   ? Identified and described pharmacologic, psychotherapeutic, and complementary treatment options  Telemedicine Visit: The patient's condition can be safely assessed and treated via synchronous audio and visual telemedicine encounter.      Reason for Telemedicine Visit: Services only offered telehealth    Originating Site (Patient Location): Patient's home    Distant Site (Provider Location): Provider Remote Setting    Consent:  The patient/guardian has verbally consented to: the potential risks and benefits of telemedicine (video visit) versus in person care; bill my insurance or make self-payment for services provided; and responsibility for payment of non-covered services.     Mode of Communication:  Video Conference via [employer]    As the provider I attest to compliance with applicable laws and regulations related to telemedicine.       Patient Participation / Response:  Fully participated with the group by sharing personal reflections / insights and  openly received / provided feedback with other participants.    Demonstrated understanding of topics discussed through group discussion and participation    Treatment Plan:  Patient has a current master individualized treatment plan.  See Epic treatment plan for more information.    Tomeka Teixeira RN

## 2021-03-25 NOTE — GROUP NOTE
"Process Group Note    PATIENT'S NAME: Mariusz Huntley  MRN:   [id number]  :   1985  ACCT. NUMBER: [id number]  DATE OF SERVICE: 3/25/21  START TIME:  9:00 AM  END TIME:  9:50 AM  FACILITATOR: Zuri Armando LPCC  TOPIC:  Process Group    Diagnoses:  Axis I:     1.  Major depressive disorder, recurrent.     2.  Generalized anxiety disorder.     3.  Posttraumatic stress disorder.   4.  Cannabis use disorder.     5.  Alcohol use disorder, in remission.   Axis II:  Deferred.   Axis III:  History of concussion 2020.     [facility] Adult Partial Hospitalization Program  TRACK: [facility]    NUMBER OF PARTICIPANTS: 4    Telemedicine Visit: The patient's condition can be safely assessed and treated via synchronous audio and visual telemedicine encounter.      Reason for Telemedicine Visit: Services only offered telehealth and due to COVID-19    Originating Site (Patient Location): Patient's home    Distant Site (Provider Location): Provider Remote Setting    Consent:  The patient/guardian has verbally consented to: the potential risks and benefits of telemedicine (video visit) versus in person care; bill my insurance or make self-payment for services provided; and responsibility for payment of non-covered services.     Mode of Communication:  Video Conference via Zoom    As the provider I attest to compliance with applicable laws and regulations related to telemedicine.            Data:    Session content: At the start of this group, patients were invited to check in by identifying themselves, describing their current emotional status, and identifying issues to address in this group.   Area(s) of treatment focus addressed in this session included Symptom Management, Personal Safety and Community Resources/Discharge Planning.    Patient reported feeling \"good today.\" Patient discussed working toward going to her chiropractor appointment and going for a walk. Patient identified movement and exercise as " skills they will use to address their goal(s). Patient reported no identifable barriers to working toward their goal(s) and/or addressing mental health symptoms. Patient reported no safety concerns and/or self-injurious behaviors. Patient reported no substance use. Patient reported they are taking their medications as prescribed. Patient reported feeling proud that they were calm when she lost her keys. Patient discussed not exploding with the treatment group.     Therapeutic Interventions/Treatment Strategies:  Psychotherapist offered support, feedback and validation and reinforced use of skills. Treatment modalities used include Motivational Interviewing and Cognitive Behavioral Therapy. Interventions include Coping Skills: Assisted patient in identifying 1-2 healthy distraction skills to reduce overall distress, Symptoms Management: Promoted understanding of their diagnoses and how it impacts their functioning and Emotions Management:  Discussed barriers to emotional regulation.    Assessment:    Patient response:   Patient responded to session by accepting feedback, giving feedback, listening, focusing on goals, being attentive and accepting support    Possible barriers to participation / learning include: and no barriers identified    Health Issues:   None reported       Substance Use Review:   Substance Use: No active concerns identified.    Mental Status/Behavioral Observations  Appearance:   Appropriate   Eye Contact:   Good   Psychomotor Behavior: Normal   Attitude:   Cooperative   Orientation:   All  Speech   Rate / Production: Normal/ Responsive Normal    Volume:  Normal   Mood:    Anxious  Depressed  Normal  Affect:    Appropriate   Thought Content:   Clear and Safety denies any current safety concerns including suicidal ideation, self-harm, and homicidal ideation  Thought Form:  Coherent  Logical     Insight:    Good     Plan:     Safety Plan: No current safety concerns identified.  Recommended that  patient call 911 or go to the local ED should there be a change in any of these risk factors.     Barriers to treatment: None identified    Patient Contracts (see media tab):  None    Substance Use: Provided encouragement towards sobriety     Continue or Discharge: Patient will continue in Adult Partial Hospitalization Program (PHP)  as planned. Patient is likely to benefit from learning and using skills as they work toward the goals identified in their treatment plan.      Zuri Armando, [facility]  March 25, 2021

## 2021-03-25 NOTE — GROUP NOTE
Psychoeducation Group Note    PATIENT'S NAME: Mariusz Huntley  MRN:   [id number]  :   1985  ACCT. NUMBER: [id number]  DATE OF SERVICE: 3/25/21  START TIME: 11:00 AM  END TIME: 11:50 AM  FACILITATOR: Raman Guerra OT  TOPIC: [facility] OT Group: Self- Regulation Skills  [facility] Adult Partial Hospitalization Program  TRACK: 1    Telemedicine Visit: The patient's condition can be safely assessed and treated via synchronous audio and visual telemedicine encounter.      Reason for Telemedicine Visit: Services only offered telehealth and Covid 19    Originating Site (Patient Location): Patient's home    Distant Site (Provider Location): [facility]: [facility] Building: Outpatient Mental Health and Addiction Services    Consent:  The patient/guardian has verbally consented to: the potential risks and benefits of telemedicine (video visit) versus in person care; bill my insurance or make self-payment for services provided; and responsibility for payment of non-covered services.     Mode of Communication:  Video Conference via Medical Zoom    As the provider I attest to compliance with applicable laws and regulations related to telemedicine.      NUMBER OF PARTICIPANTS: 4    Summary of Group / Topics Discussed:  Self-Regulation Skills: body Based Grounding skills: Patients were provided with education/review on Autonomic Nervous System activation and the importance developing awareness of need for grounding skills.  Patients continued to explore how to recognize specific signs and symptoms of their individualized state of arousal and how to make changes when needed.  Patient's explored body based skills (bottom up processing skills) grounding skills including: diaphragmatic safe functional breathing, 5 finger sensory countdown, PPSS (Push, Pull, Sway, Stretch: proprioception, deep pressure, and vestibular) internal sensory input to help manage feelings of extreme distress  and overwhelm to change level of arousal when needed to be in control and comfortable so they are able to function in different environments. Also introduced to TIPP (DBT) skill.    Patient Session Goals / Objectives:    [place] how the ANS works and importance of its  impact on functioning and mental wellbeing    [place] how developing interoceptive awareness can help one self-regulate sooner rather than later    Identified signs and symptoms of current level of arousal and ways to make changes in level of arousal when needed    Identified specific and individualized neurosensory skills to help when distressed and for crisis management    Established a plan for practice of these skills in their own environments        Patient Participation / Response:  Fully participated with the group by sharing personal reflections / insights and openly received / provided feedback with other participants.    Verbalized understanding of content and Patient would benefit from additional opportunities to practice the content to be able to generalize it to their everyday life with increased intentionality, consistency, and efficacy in support of their psychiatric recovery    Treatment Plan:  Patient has a current master individualized treatment plan.  See Epic treatment plan for more information.    Raman Guerra, OT

## 2021-03-25 NOTE — GROUP NOTE
Psychotherapy Group Note    PATIENT'S NAME: Mariusz Huntley  MRN:   [id number]  :   1985  ACCT. NUMBER: [id number]  DATE OF SERVICE: 3/25/21  START TIME: 10:00 AM  END TIME: 10:50 AM  FACILITATOR: Zuri Armando LPCC  TOPIC:  EBP Group: Specialty Awareness  [facility] Adult Partial Hospitalization Program  TRACK: [facility]    NUMBER OF PARTICIPANTS: 4    Summary of Group / Topics Discussed:  Specialty Topics: Grief/Transitions: Patients received an overview of the grief process.  Patients explored their relationship to loss and how that has affected their mental health symptoms.  Strategies for recognizing loss and ideas for engaging in the grief process was presented and discussed.  Patients identified needs for support and coping skills to manage loss.  The purpose of this specialty topic is to help patients identify the aspects of change due to loss that individuals experience in addition to mental health symptoms to better cope with the grief, loss, and life transitions.      Patient Session Goals / Objectives:    Identified grief, loss, and life transitions     Discussed how grief impacts mental health symptoms and disrupts usual functioning    Identified needs for support and coping with grief and planned further action for coping    Telemedicine Visit: The patient's condition can be safely assessed and treated via synchronous audio and visual telemedicine encounter.      Reason for Telemedicine Visit: Services only offered telehealth and due to COVID-19    Originating Site (Patient Location): Patient's home    Distant Site (Provider Location): Provider Remote Setting    Consent:  The patient/guardian has verbally consented to: the potential risks and benefits of telemedicine (video visit) versus in person care; bill my insurance or make self-payment for services provided; and responsibility for payment of non-covered services.     Mode of Communication:  Video Conference via Zoom    As  the provider I attest to compliance with applicable laws and regulations related to telemedicine.          Patient Participation / Response:  Fully participated with the group by sharing personal reflections / insights and openly received / provided feedback with other participants.    Demonstrated understanding of topics discussed through group discussion and participation, Identified / Expressed readiness to act on skill suggestions discussed in topic and Verbalized understanding of ways to proactively manage illness    Treatment Plan:  Patient has a current master individualized treatment plan.  See Epic treatment plan for more information.    Zuri Armando, [facility]

## 2021-03-26 ENCOUNTER — HOSPITAL ENCOUNTER (OUTPATIENT)
Dept: BEHAVIORAL HEALTH | Facility: CLINIC | Age: 36
End: 2021-03-26
Attending: PSYCHIATRY & NEUROLOGY
Payer: COMMERCIAL

## 2021-03-26 ENCOUNTER — TRANSFERRED RECORDS (OUTPATIENT)
Dept: HEALTH INFORMATION MANAGEMENT | Facility: CLINIC | Age: 36
End: 2021-03-26

## 2021-03-26 DIAGNOSIS — F33.1 MAJOR DEPRESSIVE DISORDER, RECURRENT EPISODE, MODERATE (H): ICD-10-CM

## 2021-03-26 DIAGNOSIS — F43.10 PTSD (POST-TRAUMATIC STRESS DISORDER): ICD-10-CM

## 2021-03-26 PROCEDURE — H0035 MH PARTIAL HOSP TX UNDER 24H: HCPCS | Mod: GT

## 2021-03-26 PROCEDURE — H0035 MH PARTIAL HOSP TX UNDER 24H: HCPCS | Mod: 95 | Performed by: SOCIAL WORKER

## 2021-03-26 PROCEDURE — H0035 MH PARTIAL HOSP TX UNDER 24H: HCPCS | Mod: GT | Performed by: COUNSELOR

## 2021-03-26 NOTE — GROUP NOTE
Psychoeducation Group Note    PATIENT'S NAME: Mariusz Huntley  MRN:   [id number]  :   1985  ACCT. NUMBER: [id number]  DATE OF SERVICE: 3/26/21  START TIME: 10:00 AM  END TIME: 10:50 AM  FACILITATOR: Raman Guerra OT  TOPIC: [facility] OT Group: Self- Regulation Skills  [facility] Adult Partial Hospitalization Program  TRACK: 1    Telemedicine Visit: The patient's condition can be safely assessed and treated via synchronous audio and visual telemedicine encounter.      Reason for Telemedicine Visit: Services only offered telehealth and covid 19    Originating Site (Patient Location): Patient's home    Distant Site (Provider Location): [facility]: [facility] Building: Outpatient Mental Health and Addiction Services    Consent:  The patient/guardian has verbally consented to: the potential risks and benefits of telemedicine (video visit) versus in person care; bill my insurance or make self-payment for services provided; and responsibility for payment of non-covered services.     Mode of Communication:  Video Conference via Medical Zoom    As the provider I attest to compliance with applicable laws and regulations related to telemedicine.      NUMBER OF PARTICIPANTS: 8    Summary of Group / Topics Discussed:  Self-Regulation Skills: Sensory Enhanced Mindfulness: Patients were provided with written and verbal psychoeducation on the concept of integrating mindfulness with a bottom up, sensory rich, experiential intervention activities to develop self-awareness and skills for self-regulation.  Emphasis placed on the benefits of mindfulness through bottom up (body based) activities and how they can help to emotionally ground oneself or provide a healthy distraction to self-regulate when distressed. Patients worked to increase knowledge and skills during a guided skilled structured sensory enhanced activity: focused activities, activities of daily living, quiet meditation and  active meditation practices can build resiliency self-efficacy. Facilitation of reflection to reinforce taught concepts was provided.     Patient Session Goals / Objectives:    Identified how using sensory enhanced mindfulness practices can be used for grounding, stress management, and self-regulation      Improved awareness of different types of sensory enhanced mindfulness activities that assist with healthy coping of stress and symptoms      Established a plan for practice of these skills in their own environments    Practiced and reflected on how to generalize taught skills to their everyday life        Patient Participation / Response:  Fully participated with the group by sharing personal reflections / insights and openly received / provided feedback with other participants.    Verbalized understanding of content and Patient would benefit from additional opportunities to practice the content to be able to generalize it to their everyday life with increased intentionality, consistency, and efficacy in support of their psychiatric recovery    Treatment Plan:  Patient has a current master individualized treatment plan.  See Epic treatment plan for more information.    Raman Guerra, OT

## 2021-03-26 NOTE — PROGRESS NOTES
[facility]   Dr. Villa's Psychiatric Progress Note  2021      Patient:  Mariusz Huntley   Medical Record Number:  [id number]  :  1985    Telemedicine Visit: The patient's condition can be safely assessed and treated via synchronous audio and visual telemedicine encounter.       Reason for Telemedicine Visit: COVID-19 lockdown     Originating Site (Patient Location):  HOME     Distant Site (Provider Location): [facility]: [place]     Consent:  The patient/guardian has verbally consented to: the potential risks and benefits of telemedicine (video visit) versus in person care; bill my insurance or make self-payment for services provided; and responsibility for payment of non-covered services.        Interim History:   The patient's care was discussed with the treatment team and chart notes were reviewed.    3/15/21:  VPA was started on 3/12/21.  Weekend was ok.  She was down most of the weekend, even though she had her kids.  Emotionally rough weekend.      3/17/21:  Pt has been very anxious.  Depression is 50% as bad as anxiety.      3/19/21:  Mood is better today.  Had nightmares and flashbacks Wed. Night. Th. Was bad.  Going out Sat. To eat tacos in [place] and going to [employer] stores.       3/22/21:  Weekend was good.  Spent lots of time with  and kids.  Lots of time outside.      3/24/21:  Today is going ok.      3/26/21:  Pt had labs drawn today at [facility].  Pt is doing the best in the last couple days.  No temper explosions.  She's going to start staying at her own place instead of at her mom's.      Psychiatric ROS:  Mood:  Pretty good;  No anger  Sleep:  Good listening to audible books.  Using Trazodone to 150mg.   Appetite:  Pretty good  Eating:  Eats 2 meals/d  Energy Level:   good  Concentration/Memory:  good  Suicidal Thoughts:No  Homicidal Thoughts:No  Psychotic Symptoms: No  Medication Side  Effects:No  Medication Compliance:Yes   Physical Complaints:  none         Medications:     PAST PSYCH MEDS:  Depakote ER, Wellbutrin, Zoloft, Lexapro, Neurontin, Vistaril, trazodone and Ritalin.    Current Outpatient Medications   Medication Sig     acetaminophen (TYLENOL) 325 MG tablet Take 2 tablets (650 mg) by mouth every 4 hours as needed for mild pain (to moderate pain)                 divalproex sodium extended-release (DEPAKOTE ER) 250 MG 24 hr tablet Take 1000mg po qHS     escitalopram (LEXAPRO) 10 MG tablet Take 1 tablet (10 mg) by mouth daily     gabapentin (NEURONTIN) 600 MG tablet Take 800 mg by mouth 3 times daily            hydrOXYzine (ATARAX) 50 MG tablet 100mg bid     ibuprofen (ADVIL/MOTRIN) 600 MG tablet Take 1 tablet (600 mg) by mouth every 6 hours as needed for moderate pain     LORazepam (ATIVAN) 2 MG tablet Take 2 mg by mouth every 6 hours as needed for anxiety Reports 10 mg ??     melatonin 3 MG tablet Take 1-2 tablets (3-6 mg) by mouth nightly as needed for sleep     methyl salicylate-menthol (TONY-OCAMPO) OINT ointment Apply 1 g topically every 4 hours as needed (pain, apply to back)     Multiple Vitamins-Minerals (MULTIVITAMIN GUMMIES ADULT PO) Take 4 tablets by mouth daily                 propranolol (INDERAL) 40 MG tablet Take 1 tablet (40 mg) by mouth 2 times daily     senna-docusate (SENOKOT-S/PERICOLACE) 8.6-50 MG tablet Take 1 tablet by mouth 2 times daily as needed for constipation     traZODone (DESYREL) 100 MG tablet Take 150 mg to 200mg by mouth At Bedtime           Vitamin D3 (CHOLECALCIFEROL) 25 mcg (1000 units) tablet Take 2 tablets (50 mcg) by mouth daily     No current facility-administered medications for this encounter.              Allergies:     Allergies   Allergen Reactions     Honeybees [Bees] Anaphylaxis     Sulfamethoxazole-Trimethoprim Nausea and Vomiting     Codeine      Sulfa Drugs      Amoxicillin Rash     Codeine Rash            Psychiatric Examination:   There  were no vitals taken for this visit.  Weight is 0 lbs 0 oz  There is no height or weight on file to calculate BMI.    Appearance:  awake, alert and adequately groomed  Attitude:  cooperative  Eye Contact:  good  Mood:  good  Affect:  mood congruent  Speech:  clear, coherent  Psychomotor Behavior:  no evidence of tardive dyskinesia, dystonia, or tics  Throught Process:  logical  Associations:  no loose associations  Thought Content:  no evidence of suicidal ideation or homicidal ideation and no evidence of psychotic thought  Insight:  good  Judgement:  intact  Oriented to:  time, person, and place  Attention Span and Concentration:  intact  Recent and Remote Memory:  intact  Gait:Normal    Risk/Potential for Dangerousness:  Multiple Active Diagnoses:HIGH  Self Care:HIGH  Suicide:LOW  Assault:LOW  Self Injurious Behaviors:LOW  Inappropriate Sexual Behavior:LOW         Labs:   No results found for this or any previous visit (from the past 24 hour(s)).     No results found for this or any previous visit (from the past 1008 hour(s)).      Impression:   This is a 35 year old female continues PHP for mood stabilization.  Mood is improving.  Groups are going well.           DIagnoses:     Axis I:     1.  Major depressive disorder, recurrent.     2.  Generalized anxiety disorder.     3.  Posttraumatic stress disorder.   4.  Cannabis use disorder.     5.  Alcohol use disorder, in remission.   Axis II:  Deferred.   Axis III:  History of concussion 02/2020.            Plan:     Complete [facility] today.  Next week starts the Day Treatment.     Started Depakote ER and titrated to 1000 mg at bedtime.   Today had labs drawn at [facility]: VPA level, LFT's and CBC.    Increased Trazodone from 100mg to 150 or 200mg at bedtime prn. .   Follow up with current outpatient medication prescriber and therapist.     Archie Villa MD

## 2021-03-26 NOTE — GROUP NOTE
Psychotherapy Group Note    PATIENT'S NAME: Mariusz Huntley  MRN:   [id number]  :   1985  ACCT. NUMBER: [id number]  DATE OF SERVICE: 3/26/21  START TIME:  2:00 PM  END TIME:  2:50 PM  FACILITATOR: Karine Mallory LICSW  TOPIC: [facility] EBP Group: Self-Awareness  [facility] Adult Partial Hospitalization Program  TRACK: 1    NUMBER OF PARTICIPANTS: 5    Summary of Group / Topics Discussed:  Self-Awareness: Self-Compassion: Patients received overview of key concepts in developing self-compassion. Patients discussed mindfulness, self-kindness, and finding common humanity. Patients identified their current approach to problems in their lives and learned skills for increasing self-compassion. Patients identified ways they can put self-compassion skills into practice and problem solve barriers to application of skills.     Patient Session Goals / Objectives:    [place] components of self-compassion    Identify ways to practice self-compassion in daily life    Problem solve barriers to self-compassion practice    Telemedicine Visit: The patient's condition can be safely assessed and treated via synchronous audio and visual telemedicine encounter.          Reason for Telemedicine Visit: Services only offered telehealth and covid19        Originating Site (Patient Location): Patient's home        Distant Site (Provider Location): Provider Remote Setting        Consent:  The patient/guardian has verbally consented to: the potential risks and benefits of telemedicine (video visit) versus in person care; bill my insurance or make self-payment for services provided; and responsibility for payment of non-covered services.         Mode of Communication:  Video Conference via [employer]        As the provider I attest to compliance with applicable laws and regulations related to telemedicine.       Patient Participation / Response:  Fully participated with the group by sharing personal reflections / insights and  openly received / provided feedback with other participants.    Demonstrated understanding of topics discussed through group discussion and participation, Identified / Expressed readiness to act intentionally, increase self-compassion, promote personal growth and Practiced skills in session    Treatment Plan:  Patient has See Epic Treatment Plan - Patient is discharging.    ESME ManleySW

## 2021-03-26 NOTE — PROGRESS NOTES
Acknowledgement of Current Treatment Plan       I have reviewed my treatment plan with my therapist / counselor on 3/26/21.   I agree with the plan as it is written in the electronic health record.    Name:      Signature:  Mariusz Huntley Unable to sign due to COVID-19     Dr. Archie Villa MD  Psychiatrist    Karine Mallory, MS, [facility], BC-DMT  Psychotherapist     Zuri Armando MA, [facility]  Psychotherapist  ALEJANDRO Mercer on 3/26/2021 at 7:25 AM

## 2021-03-26 NOTE — GROUP NOTE
Psychoeducation Group Note    PATIENT'S NAME: Mariusz Huntley  MRN:   [id number]  :   1985  ACCT. NUMBER: [id number]  DATE OF SERVICE: 3/26/21  START TIME: 11:00 AM  END TIME: 11:50 AM  FACILITATOR: Raman Guerra OT  TOPIC: [facility] OT Group: Lifestyle Balance and Structure  [facility] Adult Partial Hospitalization Program  TRACK: 1    Telemedicine Visit: The patient's condition can be safely assessed and treated via synchronous audio and visual telemedicine encounter.      Reason for Telemedicine Visit: Services only offered telehealth and covid 19    Originating Site (Patient Location): Patient's home    Distant Site (Provider Location): [facility]: [facility] Building: [facility]patient [facility]    Consent:  The patient/guardian has verbally consented to: the potential risks and benefits of telemedicine (video visit) versus in person care; bill my insurance or make self-payment for services provided; and responsibility for payment of non-covered services.     Mode of Communication:  Video Conference via Medical Zoom    As the provider I attest to compliance with applicable laws and regulations related to telemedicine.      NUMBER OF PARTICIPANTS:5    Summary of Group / Topics Discussed:  Occupational Therapy: Lifestyle Health:  Weekend Wellness: The group focused on reflecting on the past week and identifying insights and positive experiences that they want to build upon further.  The group also focused on identifying needs and any concerns for the upcoming weekend and created a list of activities and tasks that they might engage in to help support themselves and add structure their weekend.  Facilitated the sharing of individual insights and plans with validation, support, and feedback provided.     Patient Session Goals / Objectives:    Reflected on insights learned and positive experiences over the last week to help with their recovery and  wellbeing    Identified needs and concerns for the upcoming weekend and identified ways to address these    Created a written list of possible ways to structure their weekend    Identified plan to support follow through on plans and reflection on progress made       Patient Participation / Response:  Fully participated with the group by sharing personal reflections / insights and openly received / provided feedback with other participants.    Verbalized understanding of content and Patient would benefit from additional opportunities to practice the content to be able to generalize it to their everyday life with increased intentionality, consistency, and efficacy in support of their psychiatric recovery    Treatment Plan:  Patient has a current master individualized treatment plan.  See Epic treatment plan for more information.    Raman Guerra, OT

## 2021-03-26 NOTE — GROUP NOTE
Psychotherapy Group Note    PATIENT'S NAME: Mariusz Huntley  MRN:   [id number]  :   1985  ACCT. NUMBER: [id number]  DATE OF SERVICE: 3/26/21  START TIME:  1:00 PM  END TIME:  1:50 PM  FACILITATOR: Zuri Armando LPCC; Aj Anaya LPCC  TOPIC: MH EBP Group: Symptom Awareness  [facility] Adult Partial Hospitalization Program  TRACK: [facility]    NUMBER OF PARTICIPANTS: 6    Summary of Group / Topics Discussed:  Symptom Awareness: Mood Disorders: Patients received a general overview of mood disorders including depressive disorders, anxiety disorders, and bipolar disorders and how it relates to their current symptoms. The purpose is to promote understanding of their diagnoses and how it impacts their functioning. Patients reviewed their current awareness of symptoms and diagnoses. Patients received information regarding diagnoses, etiology, cultural, and environmental factors as well as impact on functioning.     Patient Session Goals / Objectives:    Discussed patient individual symptoms and experiences    Reviewed diagnostic criteria and etiology of diagnoses     Telemedicine Visit: The patient's condition can be safely assessed and treated via synchronous audio and visual telemedicine encounter.      Reason for Telemedicine Visit: Services only offered telehealth and due to COVID-19    Originating Site (Patient Location): Patient's home    Distant Site (Provider Location): Provider Remote Setting    Consent:  The patient/guardian has verbally consented to: the potential risks and benefits of telemedicine (video visit) versus in person care; bill my insurance or make self-payment for services provided; and responsibility for payment of non-covered services.     Mode of Communication:  Video Conference via Zoom    As the provider I attest to compliance with applicable laws and regulations related to telemedicine.        Patient Participation / Response:  Fully participated with the group by  sharing personal reflections / insights and openly received / provided feedback with other participants.    Demonstrated understanding of topics discussed through group discussion and participation, Demonstrated understanding of how information regarding symptoms can assist in management of symptoms and Identified / Expressed personal readiness to increase awareness of symptoms and apply skills as necessary    Treatment Plan:  Patient has See Epic Treatment Plan - Patient is discharging.    Zuri Armando, [facility]

## 2021-03-26 NOTE — GROUP NOTE
"Process Group Note    PATIENT'S NAME: Mariusz Huntley  MRN:   [id number]  :   1985  ACCT. NUMBER: [id number]  DATE OF SERVICE: 3/26/21  START TIME:  9:00 AM  END TIME:  9:50 AM  FACILITATOR: Zuri Armando [facility]; [facility]  TOPIC:  Process Group    Diagnoses:  Axis I:     1.  Major depressive disorder, recurrent.     2.  Generalized anxiety disorder.     3.  Posttraumatic stress disorder.   4.  Cannabis use disorder.     5.  Alcohol use disorder, in remission.   Axis II:  Deferred.   Axis III:  History of concussion 2020.       [facility] Adult Partial Hospitalization Program  TRACK: [facility]    NUMBER OF PARTICIPANTS: 6    Telemedicine Visit: The patient's condition can be safely assessed and treated via synchronous audio and visual telemedicine encounter.      Reason for Telemedicine Visit: Services only offered telehealth and due to COVID-19    Originating Site (Patient Location): Patient's home    Distant Site (Provider Location): Provider Remote Setting    Consent:  The patient/guardian has verbally consented to: the potential risks and benefits of telemedicine (video visit) versus in person care; bill my insurance or make self-payment for services provided; and responsibility for payment of non-covered services.     Mode of Communication:  Video Conference via Zoom    As the provider I attest to compliance with applicable laws and regulations related to telemedicine.          Data:    Session content: At the start of this group, patients were invited to check in by identifying themselves, describing their current emotional status, and identifying issues to address in this group.   Area(s) of treatment focus addressed in this session included Symptom Management, Personal Safety and Community Resources/Discharge Planning.    Patient reported feeling \"pretty\" good about discharging. Patient discussed working toward going to the doctor. Patient identified breathing and wrist " snapping as skills they will use to address their goal(s). Patient reported the weather may be a barrier to working toward their goal(s) and/or addressing mental health symptoms. Patient reported no safety concerns and/or self-injurious behaviors. Patient reported no substance use. Patient reported they are taking their medications as prescribed. Patient reported feeling proud that they completing the program. Patient discussed her discharge with the treatment group.     Therapeutic Interventions/Treatment Strategies:  Psychotherapist offered support, feedback and validation and reinforced use of skills. Treatment modalities used include Motivational Interviewing and Cognitive Behavioral Therapy. Interventions include Coping Skills: Assisted patient in identifying 1-2 healthy distraction skills to reduce overall distress, Symptoms Management: Promoted understanding of their diagnoses and how it impacts their functioning and Emotions Management:  Discussed barriers to emotional regulation.    Assessment:    Patient response:   Patient responded to session by accepting feedback, giving feedback, listening, focusing on goals, being attentive and accepting support    Possible barriers to participation / learning include: and no barriers identified    Health Issues:   None reported       Substance Use Review:   Substance Use: No active concerns identified.    Mental Status/Behavioral Observations  Appearance:   Appropriate   Eye Contact:   Good   Psychomotor Behavior: Normal   Attitude:   Cooperative   Orientation:   All  Speech   Rate / Production: Normal/ Responsive Normal    Volume:  Normal   Mood:    Anxious  Normal  Affect:    Appropriate   Thought Content:   Clear and Safety denies any current safety concerns including suicidal ideation, self-harm, and homicidal ideation  Thought Form:  Coherent  Logical     Insight:    Good     Plan:     Safety Plan: No current safety concerns identified.  Recommended that patient  call 911 or go to the local ED should there be a change in any of these risk factors.     Barriers to treatment: None identified    Patient Contracts (see media tab):  None    Substance Use: Provided encouragement towards sobriety     Continue or Discharge: Patient is being discharged today. See Treatment Plan and Discharge Summary.       Zuri Armando, [facility]  March 26, 2021

## 2021-03-29 ENCOUNTER — HOSPITAL ENCOUNTER (OUTPATIENT)
Dept: BEHAVIORAL HEALTH | Facility: CLINIC | Age: 36
End: 2021-03-29
Attending: PSYCHIATRY & NEUROLOGY
Payer: COMMERCIAL

## 2021-03-29 DIAGNOSIS — F33.1 MAJOR DEPRESSIVE DISORDER, RECURRENT EPISODE, MODERATE (H): ICD-10-CM

## 2021-03-29 DIAGNOSIS — F43.10 PTSD (POST-TRAUMATIC STRESS DISORDER): ICD-10-CM

## 2021-03-29 PROCEDURE — 90853 GROUP PSYCHOTHERAPY: CPT | Mod: GT

## 2021-03-29 PROCEDURE — 90853 GROUP PSYCHOTHERAPY: CPT | Mod: 95

## 2021-03-29 NOTE — GROUP NOTE
Psychotherapy Group Note    PATIENT'S NAME: Mariusz Huntley  MRN:   [id number]  :   1985  ACCT. NUMBER: [id number]  DATE OF SERVICE: 3/29/21  START TIME: 10:00 AM  END TIME: 10:50 AM  FACILITATOR: Natalie Luog LICSW  TOPIC: MH EBP Group: Cognitive Restructuring  [facility] Adult Mental Health Day Treatment  TRACK: 2A    Telemedicine Visit: The patient's condition can be safely assessed and treated via synchronous audio and visual telemedicine encounter.      Reason for Telemedicine Visit: Services only offered telehealth    Originating Site (Patient Location): Patient's home    Distant Site (Provider Location): [facility]: [facility]    Consent:  The patient/guardian has verbally consented to: the potential risks and benefits of telemedicine (video visit) versus in person care; bill my insurance or make self-payment for services provided; and responsibility for payment of non-covered services.     Mode of Communication:  Video Conference via zoom    As the provider I attest to compliance with applicable laws and regulations related to telemedicine.      NUMBER OF PARTICIPANTS: 6    Summary of Group / Topics Discussed:  Cognitive Restructuring: Core Beliefs: Patients received an overview of what a core belief is, and how they develop. Patients then began to identify their negative core beliefs. Patients worked to modify core beliefs with the goal of improved self-image and functioning.     Patient Session Goals / Objectives:    Familiarize self with the concept of core beliefs and how they develop.      Explore personal core beliefs (positive and negative)    Develop / advance recognition of the connection between negative thoughts and negative core beliefs.    Formulate new neutral/positive core beliefs               Patient Participation / Response:  Fully participated with the group by sharing personal reflections / insights and openly received / provided feedback with  other participants.    Demonstrated understanding of topics discussed through group discussion and participation    Treatment Plan:  Patient has an initial individualized treatment plan that was created as part of their diagnostic assessment / admission process.  A master individualized treatment plan is in the process of being developed with the patient and multi-disciplinary care team.    YAW Albert

## 2021-03-29 NOTE — GROUP NOTE
Telemedicine Visit: The patient's condition can be safely assessed and treated via synchronous audio and visual telemedicine encounter.      Reason for Telemedicine Visit: Services only offered telehealth    Originating Site (Patient Location): Patient's home    Distant Site (Provider Location): Provider Remote Setting    Consent:  The patient/guardian has verbally consented to: the potential risks and benefits of telemedicine (video visit) versus in person care; bill my insurance or make self-payment for services provided; and responsibility for payment of non-covered services.     Mode of Communication:  Video Conference via [employer]    As the provider I attest to compliance with applicable laws and regulations related to telemedicine.  Psychoeducation Group Note    PATIENT'S NAME: Mariusz Huntley  MRN:   [id number]  :   1985  ACCT. NUMBER: [id number]  DATE OF SERVICE: 3/29/21  START TIME: 11:00 AM  END TIME: 11:50 AM  FACILITATOR: Kat Cifuentes LMFT  TOPIC:  RN Group: Health Maintenance  [facility] Adult Mental Health Day Treatment  TRACK: 2a    NUMBER OF PARTICIPANTS: 6    Summary of Group / Topics Discussed:  Health Maintenance: Goal Setting: Meaningful goals can bring a sense of direction and purpose in life.  They also highlight our most important values. Patients were assisted by instructor to identify short term goals to promote their mental health recovery and improve overall health and wellness. Patients were educated on SMART goal setting framework as a strategy to increase outcomes and promote success.    Patient Session Goals / Objectives:  ? Explained the key concepts of SMART goal setting framework  ? Identified three goals successfully using SMART goal setting framework  ? Reviewed concept of balance in wellness as it pertains to goal setting        Patient Participation / Response:  Fully participated with the group by sharing personal reflections / insights and openly  received / provided feedback with other participants.    Demonstrated understanding of topics discussed through group discussion and participation, Identified / Expressed personal readiness to practice skills and Verbalized understanding of health maintenance topic    Treatment Plan:  Patient has a current master individualized treatment plan.  See Epic treatment plan for more information.    KYRA Murray

## 2021-03-29 NOTE — GROUP NOTE
Process Group Note    PATIENT'S NAME: Mariusz Huntley  MRN:   [id number]  :   1985  ACCT. NUMBER: [id number]  DATE OF SERVICE: 3/29/21  START TIME:  9:00 AM  END TIME:  9:50 AM  FACILITATOR: Natalie Lugo LICSW  TOPIC:  Process Group    Diagnoses:  1.  Major depressive disorder, recurrent.     2.  Generalized anxiety disorder.     3.  Posttraumatic stress disorder.   4.  Cannabis use disorder.     5.  Alcohol use disorder, in remission.   Axis II:  Deferred.   Axis III:  History of concussion 2020.   Axis I:     1.  Major depressive disorder, recurrent.     2.  Generalized anxiety disorder.     3.  Posttraumatic stress disorder.   4.  Cannabis use disorder.     5.  Alcohol use disorder, in remission.       [facility] Adult Mental Health Day Treatment  TRACK: 2A    Telemedicine Visit: The patient's condition can be safely assessed and treated via synchronous audio and visual telemedicine encounter.      Reason for Telemedicine Visit: Services only offered telehealth    Originating Site (Patient Location): Patient's home    Distant Site (Provider Location): [facility]: [facility] [place]    Consent:  The patient/guardian has verbally consented to: the potential risks and benefits of telemedicine (video visit) versus in person care; bill my insurance or make self-payment for services provided; and responsibility for payment of non-covered services.     Mode of Communication:  Video Conference via zoom    As the provider I attest to compliance with applicable laws and regulations related to telemedicine.      NUMBER OF PARTICIPANTS: 3          Data:    Session content: At the start of this group, patients were invited to check in by identifying themselves, describing their current emotional status, and identifying issues to address in this group.   Area(s) of treatment focus addressed in this session included Symptom Management and Personal Safety.  Pt is welcomed and oriented to  group on this her first day in the program. She shares that she struggles with depression, anxiety and ptsd. Pt reports feeling anxious, yet excited, about starting the group today. She reports doing deep breathing to reduce anxiety. Pt feels grateful for her kids. She feels proud of avoiding smoking cannabis, even though, she was around it this weekend. Denies safety concerns.    Therapeutic Interventions/Treatment Strategies:  Psychotherapist offered support, feedback and validation and reinforced use of skills. Treatment modalities used include Cognitive Behavioral Therapy. Interventions include Coping Skills: Discussed use of self-soothe skills to decrease distress in the body and Symptoms Management: Promoted understanding of their diagnoses and how it impacts their functioning.    Assessment:    Patient response:   Patient responded to session by accepting feedback, giving feedback, listening, focusing on goals, being attentive and accepting support    Possible barriers to participation / learning include: and no barriers identified    Health Issues:   None reported       Substance Use Review:   Substance Use: No active concerns identified.    Mental Status/Behavioral Observations  Appearance:   Appropriate   Eye Contact:   Good   Psychomotor Behavior: Normal   Attitude:   Cooperative   Orientation:   All  Speech   Rate / Production: Normal    Volume:  Normal   Mood:    Anxious  Depressed   Affect:    Constricted  Subdued  Worrisome   Thought Content:   Clear  Thought Form:  Coherent  Logical     Insight:    Good     Plan:     Safety Plan: No current safety concerns identified.  Recommended that patient call 911 or go to the local ED should there be a change in any of these risk factors.     Barriers to treatment: None identified    Patient Contracts (see media tab):  None    Substance Use: Not addressed in session     Continue or Discharge: Patient will continue in Adult Day Treatment (ADT)  as planned.  Patient is likely to benefit from learning and using skills as they work toward the goals identified in their treatment plan.      Natalie Magallon, [facility]  March 29, 2021

## 2021-03-30 ENCOUNTER — HOSPITAL ENCOUNTER (OUTPATIENT)
Dept: BEHAVIORAL HEALTH | Facility: CLINIC | Age: 36
End: 2021-03-30
Attending: PSYCHIATRY & NEUROLOGY
Payer: COMMERCIAL

## 2021-03-30 DIAGNOSIS — F33.1 MAJOR DEPRESSIVE DISORDER, RECURRENT EPISODE, MODERATE (H): ICD-10-CM

## 2021-03-30 DIAGNOSIS — F43.10 PTSD (POST-TRAUMATIC STRESS DISORDER): ICD-10-CM

## 2021-03-30 PROCEDURE — 90853 GROUP PSYCHOTHERAPY: CPT | Mod: 95

## 2021-03-30 NOTE — GROUP NOTE
Process Group Note    PATIENT'S NAME: Mariusz Huntley  MRN:   [id number]  :   1985  ACCT. NUMBER: [id number]  DATE OF SERVICE: 3/30/21  START TIME:  9:00 AM  END TIME:  9:50 AM  FACILITATOR: Natalie Lugo LICSW  TOPIC:  Process Group    Diagnoses:  1.  Major depressive disorder, recurrent.     2.  Generalized anxiety disorder.     3.  Posttraumatic stress disorder.   4.  Cannabis use disorder.     5.  Alcohol use disorder, in remission.   Axis II:  Deferred.   Axis III:  History of concussion 2020.   Axis I:     1.  Major depressive disorder, recurrent.     2.  Generalized anxiety disorder.     3.  Posttraumatic stress disorder.   4.  Cannabis use disorder.     5.  Alcohol use disorder, in remission.       [facility] Adult Mental Health Day Treatment  TRACK: 2A    Telemedicine Visit: The patient's condition can be safely assessed and treated via synchronous audio and visual telemedicine encounter.      Reason for Telemedicine Visit: Services only offered telehealth    Originating Site (Patient Location): Patient's home    Distant Site (Provider Location): [facility]: [facility] [place]    Consent:  The patient/guardian has verbally consented to: the potential risks and benefits of telemedicine (video visit) versus in person care; bill my insurance or make self-payment for services provided; and responsibility for payment of non-covered services.     Mode of Communication:  Video Conference via zoom    As the provider I attest to compliance with applicable laws and regulations related to telemedicine.      NUMBER OF PARTICIPANTS: 8          Data:    Session content: At the start of this group, patients were invited to check in by identifying themselves, describing their current emotional status, and identifying issues to address in this group.   Area(s) of treatment focus addressed in this session included Symptom Management and Personal Safety.  Pt reports feeling anxious today.  We discussed coping skills and she made a plan to meditate, exercise and drive with a loud radio. Pt also feels that seeing her energy healer today will be helpful. She feels grateful for her kids and is proud of getting outdoors to exercise this weekend. She is also proud of being 2 months sober from cannabis. Denies safety concerns.    Therapeutic Interventions/Treatment Strategies:  Psychotherapist offered support, feedback and validation and reinforced use of skills. Treatment modalities used include Cognitive Behavioral Therapy. Interventions include Coping Skills: Discussed use of self-soothe skills to decrease distress in the body and Assisted patient in identifying 1-2 healthy distraction skills to reduce overall distress.    Assessment:    Patient response:   Patient responded to session by accepting feedback, giving feedback, listening, focusing on goals, being attentive and accepting support    Possible barriers to participation / learning include: and no barriers identified    Health Issues:   None reported       Substance Use Review:   Substance Use: cannabis .  and Last use: 2 months ago    Mental Status/Behavioral Observations  Appearance:   Appropriate   Eye Contact:   Good   Psychomotor Behavior: Normal   Attitude:   Cooperative   Orientation:   All  Speech   Rate / Production: Normal    Volume:  Normal   Mood:    Anxious  Depressed   Affect:    Subdued  Worrisome   Thought Content:   Clear  Thought Form:  Coherent  Obsessive     Insight:    Good     Plan:     Safety Plan: No current safety concerns identified.  Recommended that patient call 911 or go to the local ED should there be a change in any of these risk factors.     Barriers to treatment: None identified    Patient Contracts (see media tab):  None    Substance Use: Provided encouragement towards sobriety     Continue or Discharge: Patient will continue in Adult Day Treatment (ADT)  as planned. Patient is likely to benefit from learning and  using skills as they work toward the goals identified in their treatment plan.      Natalie Magallon, [facility]  March 30, 2021

## 2021-03-30 NOTE — GROUP NOTE
Psychotherapy Group Note    PATIENT'S NAME: Mariusz Huntley  MRN:   [id number]  :   1985  ACCT. NUMBER: [id number]  DATE OF SERVICE: 3/30/21  START TIME: 10:00 AM  END TIME: 10:50 AM  FACILITATOR: Natalie Lugo LICSW  TOPIC: MH EBP Group: Cognitive Restructuring  [facility] Adult Mental Health Day Treatment  TRACK: 2A    Telemedicine Visit: The patient's condition can be safely assessed and treated via synchronous audio and visual telemedicine encounter.      Reason for Telemedicine Visit: Services only offered telehealth    Originating Site (Patient Location): Patient's home    Distant Site (Provider Location): [facility]: [facility] [place]    Consent:  The patient/guardian has verbally consented to: the potential risks and benefits of telemedicine (video visit) versus in person care; bill my insurance or make self-payment for services provided; and responsibility for payment of non-covered services.     Mode of Communication:  Video Conference via zoom    As the provider I attest to compliance with applicable laws and regulations related to telemedicine.      NUMBER OF PARTICIPANTS: 7    Summary of Group / Topics Discussed:  Cognitive Restructuring: Perfectionism: Patients discussed and reflected on what perfectionism is, how it develops, and how it impacts functioning. Ways to challenge perfectionism were explored and discussed by the group, with the goal of challenging perfectionistic thinking and improving functioning.    Patient Session Goals / Objectives:    Understand the concept of perfectionistic thoughts and how they develop.     Increase recognition of the connection between perfectionistic thinking and symptoms.    Explore and practice new ways to challenge the perfectionistic stance and replace with reasonable expectations of self and others.    Begin to formulate realistic personal expectations and goals.               Patient Participation / Response:  Fully  participated with the group by sharing personal reflections / insights and openly received / provided feedback with other participants.    Demonstrated understanding of topics discussed through group discussion and participation    Treatment Plan:  Patient has an initial individualized treatment plan that was created as part of their diagnostic assessment / admission process.  A master individualized treatment plan is in the process of being developed with the patient and multi-disciplinary care team.    Naatlie Magallon, LICSW

## 2021-03-30 NOTE — GROUP NOTE
Telemedicine Visit: The patient's condition can be safely assessed and treated via synchronous audio and visual telemedicine encounter.      Reason for Telemedicine Visit: Services only offered telehealth    Originating Site (Patient Location): Patient's home    Distant Site (Provider Location): Provider Remote Setting    Consent:  The patient/guardian has verbally consented to: the potential risks and benefits of telemedicine (video visit) versus in person care; bill my insurance or make self-payment for services provided; and responsibility for payment of non-covered services.     Mode of Communication:  Video Conference via [employer]    As the provider I attest to compliance with applicable laws and regulations related to telemedicine.  Psychotherapy Group Note    PATIENT'S NAME: Mariusz Huntley  MRN:   [id number]  :   1985  ACCT. NUMBER: [id number]  DATE OF SERVICE: 3/30/21  START TIME: 11:00 AM  END TIME: 11:50 AM  FACILITATOR: Kat Cifuentes LMFT  TOPIC:  EBP Group: Cognitive Restructuring  [facility] Adult Mental Health Day Treatment  TRACK: 2A    NUMBER OF PARTICIPANTS: 7    Summary of Group / Topics Discussed:  Cognitive Restructuring: Distortions: Patients received an overview of how to identify common cognitive distortions. Patients will explore alternatives to cognitive distortions and practice challenging their negative thought patterns. The goal is to help patients target modify ineffective thought patterns.     Patient Session Goals / Objectives:    Familiarized self with ineffective / unhealthy thoughts and how they develop.      Explored impact of ineffective thoughts / distortions on mood and activity    Formulated new neutral/positive alternatives to challenge less helpful / ineffective thoughts.    Practiced and plan to apply in daily life               Patient Participation / Response:  Fully participated with the group by sharing personal reflections / insights and  openly received / provided feedback with other participants.    Demonstrated understanding of topics discussed through group discussion and participation, Verbalized understanding of patient's own thought patterns and how they impact their mood and behaviors and Practiced skills in session    Treatment Plan:  Patient has a current master individualized treatment plan.  See Epic treatment plan for more information.    KYRA Murray

## 2021-04-01 ENCOUNTER — HOSPITAL ENCOUNTER (OUTPATIENT)
Dept: BEHAVIORAL HEALTH | Facility: CLINIC | Age: 36
End: 2021-04-01
Attending: PSYCHIATRY & NEUROLOGY
Payer: COMMERCIAL

## 2021-04-01 DIAGNOSIS — F43.10 PTSD (POST-TRAUMATIC STRESS DISORDER): ICD-10-CM

## 2021-04-01 DIAGNOSIS — F33.1 MAJOR DEPRESSIVE DISORDER, RECURRENT EPISODE, MODERATE (H): ICD-10-CM

## 2021-04-01 PROCEDURE — 90853 GROUP PSYCHOTHERAPY: CPT | Mod: GT

## 2021-04-01 PROCEDURE — 90853 GROUP PSYCHOTHERAPY: CPT | Mod: 95 | Performed by: COUNSELOR

## 2021-04-01 NOTE — GROUP NOTE
Psychotherapy Group Note    PATIENT'S NAME: Mariusz Huntley  MRN:   [id number]  :   1985  ACCT. NUMBER: [id number]  DATE OF SERVICE: 21  START TIME:  9:00 AM  END TIME:  9:50 AM  FACILITATOR: Viviana Cruz [facility]  TOPIC:  EBP Group: Emotions Management  [facility] Adult Mental Health Day Treatment  TRACK: 2A    NUMBER OF PARTICIPANTS: 7    Telemedicine Visit: The patient's condition can be safely assessed and treated via synchronous audio and visual telemedicine encounter.      Reason for Telemedicine Visit: Services only offered telehealth    Originating Site (Patient Location): Patient's home    Distant Site (Provider Location): Provider Remote Setting    Consent:  The patient/guardian has verbally consented to: the potential risks and benefits of telemedicine (video visit) versus in person care; bill my insurance or make self-payment for services provided; and responsibility for payment of non-covered services.     Mode of Communication:  Video Conference via [employer]    As the provider I attest to compliance with applicable laws and regulations related to telemedicine.      Summary of Group / Topics Discussed:  Emotions Management: Guilt and Shame: Patients explored and shared personal experiences associated with feelings of guilt and shame.  Group explored how these feelings develop, what they mean to each individual, and how to increase acceptance and usefulness of these feelings.  Group members assisted one another to contextualize these concepts and promote healing.     Patient Session Goals / Objectives:    Discuss and review definitions and personal views/experiences with guilt and shame    Understand the differences between guilt and shame    Explore how feelings of guilt and shame impact functioning    Understand and practice strategies to manage difficult emotions and move towards healing    Understand and normalize difficult emotions through group  discussion    Understand the utility of guilt and shame    Target  unwanted  emotions for change      Patient Participation / Response:  Fully participated with the group by sharing personal reflections / insights and openly received / provided feedback with other participants.    Demonstrated understanding of topics discussed through group discussion and participation, Expressed understanding of the relevance / importance of emotions management skills at distressing times in life and Self-aware of experiences with difficult emotions, and strategies to employ to manage them    Treatment Plan:  Patient has a current master individualized treatment plan.  See Epic treatment plan for more information.    Viviana Cruz, [facility]

## 2021-04-01 NOTE — GROUP NOTE
Telemedicine Visit: The patient's condition can be safely assessed and treated via synchronous audio and visual telemedicine encounter.      Reason for Telemedicine Visit: Services only offered telehealth    Originating Site (Patient Location): Patient's home    Distant Site (Provider Location): Provider Remote Setting    Consent:  The patient/guardian has verbally consented to: the potential risks and benefits of telemedicine (video visit) versus in person care; bill my insurance or make self-payment for services provided; and responsibility for payment of non-covered services.     Mode of Communication:  Video Conference via [employer]    As the provider I attest to compliance with applicable laws and regulations related to telemedicine.  Psychotherapy Group Note    PATIENT'S NAME: Mariusz Huntley  MRN:   [id number]  :   1985  ACCT. NUMBER: [id number]  DATE OF SERVICE: 21  START TIME: 10:00 AM  END TIME: 10:50 AM  FACILITATOR: Kat Cifuentes LMFT  TOPIC:  EBP Group: Behavioral Activation  [facility] Adult Mental Health Day Treatment  TRACK: 2A    NUMBER OF PARTICIPANTS: 7    Summary of Group / Topics Discussed:  Behavioral Activation: Activity Scheduling:Patients explored how they currently spend their time, and how specific behaviors impact thoughts and feelings.  The group explored the effect of negative and positive activities on mood states and thought patterns.  Patients identified activities that help to improve mood and thinking patterns, and developed a plan to implement positive activities between sessions.      Patient Session Goals / Objectives:    Identify impact of current behaviors on thoughts and mood    Identify 2-3 behavioral changes that could have a positive impact on thoughts and mood    Prepare to make desired behavioral change: Create a change plan / activity schedule      Patient Participation / Response:  Fully participated with the group by sharing personal  reflections / insights and openly received / provided feedback with other participants.    Demonstrated understanding of topics discussed through group discussion and participation, Expressed understanding of the relationship between behaviors, thoughts, and feelings and Practiced skills in session    Treatment Plan:  Patient has a current master individualized treatment plan.  See Epic treatment plan for more information.    KYRA Murray

## 2021-04-01 NOTE — GROUP NOTE
Psychotherapy Group Note    PATIENT'S NAME: Mariusz Huntley  MRN:   [id number]  :   1985  ACCT. NUMBER: [id number]  DATE OF SERVICE: 21  START TIME: 11:00 AM  END TIME: 11:50 AM  FACILITATOR: Natalie Lugo LICSW  TOPIC: MH EBP Group: Mindfulness  [facility] Adult Mental Health Day Treatment  TRACK: 2A    Telemedicine Visit: The patient's condition can be safely assessed and treated via synchronous audio and visual telemedicine encounter.      Reason for Telemedicine Visit: Services only offered telehealth    Originating Site (Patient Location): Patient's home    Distant Site (Provider Location): [facility]: [facility]    Consent:  The patient/guardian has verbally consented to: the potential risks and benefits of telemedicine (video visit) versus in person care; bill my insurance or make self-payment for services provided; and responsibility for payment of non-covered services.     Mode of Communication:  Video Conference via zoom    As the provider I attest to compliance with applicable laws and regulations related to telemedicine.      NUMBER OF PARTICIPANTS: 6    Summary of Group / Topics Discussed:  Mindfulness: Mindfulness Skills: Patients received information on the main components of mindfulness. Patients participated in discussion on how to practice observing, describing, and participating in internal and external environments. Relevance of mindfulness skills to overall mental and physical health was explored.  Patients explored and discussed in group their current awareness and knowledge of mindfulness skills as well as barriers to applying skills.    Patient Session Goals / Objectives:    Demonstrated and verbalized understanding of key mindfulness concepts    Identified when/how to use mindfulness skills    Resolved barriers to practicing mindfulness skills    Identified plan to use mindfulness skills in daily life       Patient Participation / Response:  Fully  participated with the group by sharing personal reflections / insights and openly received / provided feedback with other participants.    Demonstrated understanding of topics discussed through group discussion and participation    Treatment Plan:  Patient has an initial individualized treatment plan that was created as part of their diagnostic assessment / admission process.  A master individualized treatment plan is in the process of being developed with the patient and multi-disciplinary care team.    Natalie Magallon, LICSW

## 2021-04-03 ENCOUNTER — HEALTH MAINTENANCE LETTER (OUTPATIENT)
Age: 36
End: 2021-04-03

## 2021-04-05 ENCOUNTER — HOSPITAL ENCOUNTER (OUTPATIENT)
Dept: BEHAVIORAL HEALTH | Facility: CLINIC | Age: 36
End: 2021-04-05
Attending: PSYCHIATRY & NEUROLOGY
Payer: COMMERCIAL

## 2021-04-05 DIAGNOSIS — F33.1 MAJOR DEPRESSIVE DISORDER, RECURRENT EPISODE, MODERATE (H): ICD-10-CM

## 2021-04-05 DIAGNOSIS — F43.10 PTSD (POST-TRAUMATIC STRESS DISORDER): ICD-10-CM

## 2021-04-05 PROCEDURE — 90853 GROUP PSYCHOTHERAPY: CPT | Mod: 95 | Performed by: PSYCHOLOGIST

## 2021-04-05 PROCEDURE — 90853 GROUP PSYCHOTHERAPY: CPT | Mod: GT

## 2021-04-05 PROCEDURE — 90853 GROUP PSYCHOTHERAPY: CPT | Mod: GT | Performed by: PSYCHOLOGIST

## 2021-04-05 NOTE — GROUP NOTE
Psychotherapy Group Note    PATIENT'S NAME: Mariusz Huntley  MRN:   [id number]  :   1985  ACCT. NUMBER: [id number]  DATE OF SERVICE: 21  START TIME: 11:00 AM  END TIME: 11:50 AM  FACILITATOR: Natalie Lugo LICSW  TOPIC: MH EBP Group: Specialty Awareness  [facility] Adult Mental Health Day Treatment  TRACK: 2A    Telemedicine Visit: The patient's condition can be safely assessed and treated via synchronous audio and visual telemedicine encounter.      Reason for Telemedicine Visit: Services only offered telehealth    Originating Site (Patient Location): Patient's home    Distant Site (Provider Location): [facility]: [facility] [place]    Consent:  The patient/guardian has verbally consented to: the potential risks and benefits of telemedicine (video visit) versus in person care; bill my insurance or make self-payment for services provided; and responsibility for payment of non-covered services.     Mode of Communication:  Video Conference via zoom    As the provider I attest to compliance with applicable laws and regulations related to telemedicine.      NUMBER OF PARTICIPANTS: 7    Summary of Group / Topics Discussed:  Specialty Topics: Hope: The topic of hope was presented in order to help patients better understand the symptoms of hopelessness and how to become more hopeful. Patients discussed their current awareness of the topic and relevance to their functioning. Individual experiences with symptoms and treatment options were also discussed. Patients explored options for ongoing/future treatment and symptom management.      Patient Session Goals / Objectives:    Discussed definition of hopelessness    Discussed how hopelessness impacts functioning    Set a plan to utilize skills to reduce hopelessness        Patient Participation / Response:  Fully participated with the group by sharing personal reflections / insights and openly received / provided feedback with other  participants.    Demonstrated understanding of topics discussed through group discussion and participation    Treatment Plan:  Patient has a current master individualized treatment plan.  See Epic treatment plan for more information.    Natalie Magallon, ESMESW

## 2021-04-05 NOTE — ADDENDUM NOTE
Encounter addended by: Milagros Meza PsyD on: 4/5/2021 12:53 PM   Actions taken: Clinical Note Signed, Flowsheet accepted

## 2021-04-05 NOTE — GROUP NOTE
Process Group Note  Telemedicine Visit: The patient's condition can be safely assessed and treated via synchronous audio and visual telemedicine encounter.    Reason for Telemedicine Visit: Patient has requested telehealth visit  Originating Site (Patient Location): Patient's home  Distant Site (Provider Location): [facility] Outpatient Setting: Adult Day Tx  Consent:  The patient/guardian has verbally consented to: the potential risks and benefits of telemedicine (video visit) versus in person care; bill my insurance or make self-payment for services provided; and responsibility for payment of non-covered services.   Mode of Communication:  Video Conference via QUIQ  As the provider I attest to compliance with applicable laws and regulations related to telemedicine.    PATIENT'S NAME: Mariusz Huntley  MRN:   [id number]  :   1985  ACCT. NUMBER: [id number]  DATE OF SERVICE: 21  START TIME:  9:00 AM  END TIME:  9:50 AM  FACILITATOR: Milagros Meza PsyD  TOPIC:  Process Group    Diagnoses:  296.32 Major Depressive Disorder, Moderate  309.81 PTSD  R/O Panic Disorder    Psychiatry: [facility] [place]  Therapy: Dr. [place], [facility]  PCP:  ......    [facility] Adult Mental Health Day Treatment  TRACK: 2A    NUMBER OF PARTICIPANTS: 8          Data:    Session content: At the start of this group, patients were invited to check in by identifying themselves, describing their current emotional status, and identifying issues to address in this group.   Area(s) of treatment focus addressed in this session included Symptom Management, Personal Safety, Community Resources/Discharge Planning and Abstinence/Relapse Prevention.  Client reported being safe today.  Reported mood is deoressed,    Goal for today is to attend therapy.  The client talked to the group about how she had fear about triggers to PTSD and police sirens. She reported no nightmares and  sleeping though the night with Trazodone.  She reported that she works with a spiritual healer and an individual therapist.      Therapeutic Interventions/Treatment Strategies:  Psychotherapist reinforced use of skills. Treatment modalities used include Cognitive Behavioral Therapy and Dialectical Behavioral Therapy. Interventions include Cognitive Restructuring:  Assisted patient in formulating new neutral/positive alternatives to challenge less helpful / ineffective thoughts, Coping Skills: Reviewed patients current calming practices and discussed a more formal way of practicing and accessing skills, Relapse Prevention: Discussed the use of substances and its impact on their relationships and Mindfulness: Facilitated discussion of when/how to use mindfulness skills to benefit general health, mental health symptoms, and stressors.    Assessment:    Patient response:   Patient responded to session by listening, focusing on goals and accepting support    Possible barriers to participation / learning include: severity of symptoms    Health Issues:   None reported       Substance Use Review:   Substance Use: No active concerns identified.    Mental Status/Behavioral Observations  Appearance:   Appropriate   Eye Contact:   Good   Psychomotor Behavior: Normal   Attitude:   Cooperative   Orientation:   All  Speech   Rate / Production: Normal    Volume:  Normal   Mood:    Depressed  Sad   Affect:    Appropriate   Thought Content:   Clear  Thought Form:  Coherent  Logical     Insight:    Good     Plan:     Safety Plan: Recommended that patient call 911 or go to the local ED should there be a change in any of these risk factors.     Barriers to treatment: None identified    Patient Contracts (see media tab):  None    Substance Use: Provided encouragement towards sobriety     Continue or Discharge: Patient will continue in Adult Day Treatment (ADT)  as planned. Patient is likely to benefit from learning and using skills as  they work toward the goals identified in their treatment plan.      Milagros Meza PsyD  April 5, 2021  Sheryl Head, FEDE,  Licensed Clinical Psychologist

## 2021-04-05 NOTE — GROUP NOTE
Psychotherapy Group Note  Telemedicine Visit: The patient's condition can be safely assessed and treated via synchronous audio and visual telemedicine encounter.    Reason for Telemedicine Visit: Patient has requested telehealth visit  Originating Site (Patient Location): Patient's home  Distant Site (Provider Location): [facility] Outpatient Setting: Adult Day Tx  Consent:  The patient/guardian has verbally consented to: the potential risks and benefits of telemedicine (video visit) versus in person care; bill my insurance or make self-payment for services provided; and responsibility for payment of non-covered services.   Mode of Communication:  Video Conference via [employer]  As the provider I attest to compliance with applicable laws and regulations related to telemedicine.    PATIENT'S NAME: Mariusz Huntley  MRN:   [id number]  :   1985  ACCT. NUMBER: [id number]  DATE OF SERVICE: 21  START TIME: 10:00 AM  END TIME: 10:50 AM  FACILITATOR: Milagros Meza PsyD  TOPIC: MH EBP Group: Emotions Management  [facility] Adult Mental Health Day Treatment  TRACK: 2A    NUMBER OF PARTICIPANTS: 8    Summary of Group / Topics Discussed:  Emotions Management: Opposite to Emotion: Patients discussed past and present struggles with knowing how to make changes in their lives due to difficult emotional experiences.  Explored desires to experience and feel less anger, sadness, guilt, and fear.  Reviewed the therapeutic skill of opposite action and patients explored opportunities to use their behaviors as a tool to reduce an emotion that they want to change.     Patient Session Goals / Objectives:    Review DBT concepts and focus on patient s experiences of distress and difficult emotional experiences.    Learn how to do the opposite of what an emotion makes us want to do in an effort to decrease an unwanted emotional experience.    Demonstrate understanding of the skill of opposite action by  sharing experiences where the technique could be useful in past / present situations.      Patient Participation / Response:  Fully participated with the group by sharing personal reflections / insights and openly received / provided feedback with other participants.    Self-aware of experiences with difficult emotions, and strategies to employ to manage them    Treatment Plan:  Patient has a current master individualized treatment plan.  See Epic treatment plan for more information.    Analia Sousa Psy., D,  Licensed Clinical Psychologist

## 2021-04-05 NOTE — PROGRESS NOTES
Acknowledgement of Current Treatment Plan       I have reviewed my treatment plan with my therapist / counselor on 4/5/2021  .   I agree with the plan as it is written in the electronic health record. (2A)    Name:      Signature:  Mariusz Huntley   Unable to sign due to COVID19       Dr Edwardo Urena MD  Psychiatrist    Selene Head.FEDE,  Licensed Psychologist   Sheryl Head, D,  Licensed Clinical Psychologist

## 2021-04-06 ENCOUNTER — HOSPITAL ENCOUNTER (OUTPATIENT)
Dept: BEHAVIORAL HEALTH | Facility: CLINIC | Age: 36
End: 2021-04-06
Attending: PSYCHIATRY & NEUROLOGY
Payer: COMMERCIAL

## 2021-04-06 DIAGNOSIS — F43.10 PTSD (POST-TRAUMATIC STRESS DISORDER): ICD-10-CM

## 2021-04-06 DIAGNOSIS — F33.1 MAJOR DEPRESSIVE DISORDER, RECURRENT EPISODE, MODERATE (H): ICD-10-CM

## 2021-04-06 PROCEDURE — 90853 GROUP PSYCHOTHERAPY: CPT | Mod: 95

## 2021-04-06 PROCEDURE — 90853 GROUP PSYCHOTHERAPY: CPT | Mod: GT | Performed by: PSYCHOLOGIST

## 2021-04-06 NOTE — GROUP NOTE
Telemedicine Visit: The patient's condition can be safely assessed and treated via synchronous audio and visual telemedicine encounter.      Reason for Telemedicine Visit: Services only offered telehealth    Originating Site (Patient Location): Patient's home    Distant Site (Provider Location): Provider Remote Setting    Consent:  The patient/guardian has verbally consented to: the potential risks and benefits of telemedicine (video visit) versus in person care; bill my insurance or make self-payment for services provided; and responsibility for payment of non-covered services.     Mode of Communication:  Video Conference via [employer]    As the provider I attest to compliance with applicable laws and regulations related to telemedicine.  Psychotherapy Group Note    PATIENT'S NAME: Mariusz Huntley  MRN:   [id number]  :   1985  ACCT. NUMBER: [id number]  DATE OF SERVICE: 21  START TIME: 11:00 AM  END TIME: 11:50 AM  FACILITATOR: Kat Cifuentes LMFT  TOPIC:  EBP Group: Symptom Awareness  [facility] Adult Mental Health Day Treatment  TRACK: 2A    NUMBER OF PARTICIPANTS: 7    Summary of Group / Topics Discussed:  Symptom Awareness: Symptom Observation and Tracking: An overview of symptom observation and tracking was presented to help patients identify specific symptoms and identify patterns. This topic will also assist patient in identifying progress towards goal of decreasing severity of symptoms and increasing overall functioning. Patients completed a symptom check list in session. Patient was assisted in identifying baseline functioning, patterns, and ways to assess current symptoms. Patient was also assisted in identifying a tool or strategy to continue to track or monitor symptoms over a period of time.       Patient Session Goals / Objectives:    Identified patient individual symptoms and experiences    Identified potential symptom patterns and factors that contribute to changes in  symptom severity      Patient Participation / Response:  Fully participated with the group by sharing personal reflections / insights and openly received / provided feedback with other participants.    Demonstrated understanding of how information regarding symptoms can assist in management of symptoms and Practiced skills in session    Treatment Plan:  Patient has a current master individualized treatment plan.  See Epic treatment plan for more information.    KYRA Murray

## 2021-04-06 NOTE — GROUP NOTE
Psychotherapy Group Note    PATIENT'S NAME: Mariusz Huntley  MRN:   [id number]  :   1985  ACCT. NUMBER: [id number]  DATE OF SERVICE: 21  START TIME: 10:00 AM  END TIME: 10:50 AM  FACILITATOR: Natalie Lugo LICSW  TOPIC: MH EBP Group: Specialty Awareness  [facility] Adult Mental Health Day Treatment  TRACK: 2A    Telemedicine Visit: The patient's condition can be safely assessed and treated via synchronous audio and visual telemedicine encounter.      Reason for Telemedicine Visit: Services only offered telehealth    Originating Site (Patient Location): Patient's home    Distant Site (Provider Location): [facility] [facility] [place]    Consent:  The patient/guardian has verbally consented to: the potential risks and benefits of telemedicine (video visit) versus in person care; bill my insurance or make self-payment for services provided; and responsibility for payment of non-covered services.     Mode of Communication:  Video Conference via zoom    As the provider I attest to compliance with applicable laws and regulations related to telemedicine.      NUMBER OF PARTICIPANTS: 7    Summary of Group / Topics Discussed:  Specialty Topics: Hope Part 2: The topic of hope was presented in order to help patients better understand the symptoms of hopelessness and how to become more hopeful. Patients discussed their current awareness of the topic and relevance to their functioning. Individual experiences with symptoms and treatment options were also discussed. Patients explored options for ongoing/future treatment and symptom management.      Patient Session Goals / Objectives:    Discussed definition of hopelessness    Discussed how hopelessness impacts functioning    Set a plan to utilize skills to reduce hopelessness        Patient Participation / Response:  Fully participated with the group by sharing personal reflections / insights and openly received / provided feedback with  other participants.    Demonstrated understanding of topics discussed through group discussion and participation and Identified / Expressed readiness to act on skill suggestions discussed in topic    Treatment Plan:  Patient has a current master individualized treatment plan.  See Epic treatment plan for more information.    Natalie Magallon, LICSW

## 2021-04-06 NOTE — GROUP NOTE
Process Group Note  Telemedicine Visit: The patient's condition can be safely assessed and treated via synchronous audio and visual telemedicine encounter.    Reason for Telemedicine Visit: Patient has requested telehealth visit  Originating Site (Patient Location): Patient's home  Distant Site (Provider Location): [facility] Outpatient Setting: Adult Day Tx  Consent:  The patient/guardian has verbally consented to: the potential risks and benefits of telemedicine (video visit) versus in person care; bill my insurance or make self-payment for services provided; and responsibility for payment of non-covered services.   Mode of Communication:  Video Conference via [employer]  As the provider I attest to compliance with applicable laws and regulations related to telemedicine.    PATIENT'S NAME: Mariusz Huntley  MRN:   [id number]  :   1985  ACCT. NUMBER: [id number]  DATE OF SERVICE: 21  START TIME:  9:00 AM  END TIME:  9:50 AM  FACILITATOR: Milagros Meza PsyD  TOPIC:  Process Group    Diagnoses:  296.32 Major Depressive Disorder, Moderate  309.81 PTSD  R/O Panic Disorder    Psychiatry: [facility] [place]  Therapy: Dr. Casper, [facility]  PCP:  ......      [facility] Adult Mental Health Day Treatment  TRACK: 2A    NUMBER OF PARTICIPANTS: 8          Data:    Session content: At the start of this group, patients were invited to check in by identifying themselves, describing their current emotional status, and identifying issues to address in this group.   Area(s) of treatment focus addressed in this session included Symptom Management, Personal Safety, Community Resources/Discharge Planning and Abstinence/Relapse Prevention.  Client reported being safe today.  Reported mood is  Anxious.   Goal for today is to attend therapy groups.The client talked to the group about how she is staying sober and managing her symptoms of nightmares and flashbacks. She  talked with the group about this.      Therapeutic Interventions/Treatment Strategies:  Psychotherapist offered support, feedback and validation and reinforced use of skills. Treatment modalities used include Cognitive Behavioral Therapy and Dialectical Behavioral Therapy. Interventions include Relapse Prevention: Facilitated understanding of effective coping skills in response to triggers for substance use, Mindfulness: Encouraged a plan to use mindfulness skills in daily life and Symptoms Management: Promoted understanding of their diagnoses and how it impacts their functioning.    Assessment:    Patient response:   Patient responded to session by giving feedback, listening and being attentive    Possible barriers to participation / learning include: severity of symptoms    Health Issues:   None reported       Substance Use Review:   Substance Use: No active concerns identified.    Mental Status/Behavioral Observations  Appearance:   Appropriate   Eye Contact:   Good   Psychomotor Behavior: Normal   Attitude:   Cooperative   Orientation:   All  Speech   Rate / Production: Normal    Volume:  Normal   Mood:    Anxious  Depressed   Affect:    Constricted   Thought Content:   Clear  Thought Form:  Coherent  Logical     Insight:    Good     Plan:     Safety Plan: Recommended that patient call 911 or go to the local ED should there be a change in any of these risk factors.     Barriers to treatment: None identified    Patient Contracts (see media tab):  None    Substance Use: Provided encouragement towards sobriety     Continue or Discharge: Patient will continue in Adult Day Treatment (ADT)  as planned. Patient is likely to benefit from learning and using skills as they work toward the goals identified in their treatment plan.      Milagros Meza PsyD  April 6, 2021  Sheryl Head, FEDE,  Licensed Clinical Psychologist

## 2021-04-08 ENCOUNTER — HOSPITAL ENCOUNTER (OUTPATIENT)
Dept: BEHAVIORAL HEALTH | Facility: CLINIC | Age: 36
End: 2021-04-08
Attending: PSYCHIATRY & NEUROLOGY
Payer: COMMERCIAL

## 2021-04-08 DIAGNOSIS — F33.1 MAJOR DEPRESSIVE DISORDER, RECURRENT EPISODE, MODERATE (H): ICD-10-CM

## 2021-04-08 DIAGNOSIS — F43.10 PTSD (POST-TRAUMATIC STRESS DISORDER): ICD-10-CM

## 2021-04-08 PROCEDURE — 90853 GROUP PSYCHOTHERAPY: CPT | Mod: 95

## 2021-04-08 PROCEDURE — 90853 GROUP PSYCHOTHERAPY: CPT | Mod: GT | Performed by: PSYCHOLOGIST

## 2021-04-08 NOTE — GROUP NOTE
Process Group Note  Telemedicine Visit: The patient's condition can be safely assessed and treated via synchronous audio and visual telemedicine encounter.    Reason for Telemedicine Visit: Patient has requested telehealth visit  Originating Site (Patient Location): Patient's home  Distant Site (Provider Location): [facility] Outpatient Setting: Adult Day Tx  Consent:  The patient/guardian has verbally consented to: the potential risks and benefits of telemedicine (video visit) versus in person care; bill my insurance or make self-payment for services provided; and responsibility for payment of non-covered services.   Mode of Communication:  Video Conference via [employer]  As the provider I attest to compliance with applicable laws and regulations related to telemedicine.    PATIENT'S NAME: Mariusz Huntley  MRN:   [id number]  :   1985  ACCT. NUMBER: [id number]  DATE OF SERVICE: 21  START TIME:  9:00 AM  END TIME:  9:50 AM  FACILITATOR: Milagros Meza PsyD  TOPIC:  Process Group    Diagnoses:  296.32 Major Depressive Disorder, Moderate  309.81 PTSD  R/O Panic Disorder    Psychiatry: [facility] [place]  Therapy: [facility]. [facility], [facility]  PCP:  ......      [facility] Adult Mental Health Day Treatment  TRACK: 2A    NUMBER OF PARTICIPANTS: 7          Data:    Session content: At the start of this group, patients were invited to check in by identifying themselves, describing their current emotional status, and identifying issues to address in this group.   Area(s) of treatment focus addressed in this session included Symptom Management, Personal Safety and Community Resources/Discharge Planning.  Client reported being safe today.  Reported mood is ok.    Goal for today is to attend therapy groups.  The client talked to the group about how she did some impulsive buying and got in-line skates, but said that her old pair are getting worn out and needed to be  "updated. She reported that she enjoys this for exercise. She reoprted that She celebrated her 's birthday and that she went to a therapy appointment.      Therapeutic Interventions/Treatment Strategies:  Psychotherapist offered support, feedback and validation and reinforced use of skills. Treatment modalities used include Cognitive Behavioral Therapy and Dialectical Behavioral Therapy. Interventions include Cognitive Restructuring:  Assisted patient in formulating new neutral/positive alternatives to challenge less helpful / ineffective thoughts, Coping Skills: Discussed how the use of intentional \"in the moment\" actions can help reduce distress, Relapse Prevention: Facilitated understanding of effective coping skills in response to triggers for substance use and Mindfulness: Encouraged a plan to use mindfulness skills in daily life.    Assessment:    Patient response:   Patient responded to session by listening, focusing on goals and accepting support    Possible barriers to participation / learning include: severity of symptoms    Health Issues:   None reported       Substance Use Review:   Substance Use: No active concerns identified.    Mental Status/Behavioral Observations  Appearance:   Appropriate   Eye Contact:   Good   Psychomotor Behavior: Normal   Attitude:   Cooperative   Orientation:   All  Speech   Rate / Production: Normal    Volume:  Normal   Mood:    Anxious  Depressed   Affect:    Constricted   Thought Content:   Clear  Thought Form:  Coherent  Logical     Insight:    Good     Plan:     Safety Plan: Recommended that patient call 911 or go to the local ED should there be a change in any of these risk factors.     Barriers to treatment: None identified    Patient Contracts (see media tab):  None    Substance Use: Provided encouragement towards sobriety     Continue or Discharge: Patient will continue in Adult Day Treatment (ADT)  as planned. Patient is likely to benefit from learning and " using skills as they work toward the goals identified in their treatment plan.      Milagros Meza PsyD  April 8, 2021  Sheryl Head, FEDE,  Licensed Clinical Psychologist

## 2021-04-08 NOTE — GROUP NOTE
Psychotherapy Group Note    PATIENT'S NAME: Mariusz Huntley  MRN:   [id number]  :   1985  ACCT. NUMBER: [id number]  DATE OF SERVICE: 21  START TIME: 11:00 AM  END TIME: 11:50 AM  FACILITATOR: Natalie Lugo LICSW  TOPIC: MH EBP Group: Specialty Awareness  [facility] Adult Mental Health Day Treatment  TRACK: 2A    Telemedicine Visit: The patient's condition can be safely assessed and treated via synchronous audio and visual telemedicine encounter.      Reason for Telemedicine Visit: Services only offered telehealth    Originating Site (Patient Location): Patient's home    Distant Site (Provider Location): [facility]: [facility] [place]    Consent:  The patient/guardian has verbally consented to: the potential risks and benefits of telemedicine (video visit) versus in person care; bill my insurance or make self-payment for services provided; and responsibility for payment of non-covered services.     Mode of Communication:  Video Conference via zoom    As the provider I attest to compliance with applicable laws and regulations related to telemedicine.      NUMBER OF PARTICIPANTS: 6    Summary of Group / Topics Discussed:  Specialty Topics: Life Transitions: The topic of life transitions was presented in order to help patients to better understand the challenges presented by life transitions, and how to best navigate them. Exploring the phases of transition and how one works through them was discussed. Patients were provided with information regarding community resources.     Patient Session Goals / Objectives:    Discussed the timing and nature of major life transitions    Explored how life transitions may impact mental health and functioning    Discussed coping strategies to manage symptoms and help with transitioning    Discussed and planned a successful transition        Patient Participation / Response:  Fully participated with the group by sharing personal reflections /  insights and openly received / provided feedback with other participants.    Demonstrated understanding of topics discussed through group discussion and participation    Treatment Plan:  Patient has a current master individualized treatment plan.  See Epic treatment plan for more information.    ESME AlbertSW

## 2021-04-08 NOTE — GROUP NOTE
Telemedicine Visit: The patient's condition can be safely assessed and treated via synchronous audio and visual telemedicine encounter.      Reason for Telemedicine Visit: Services only offered telehealth    Originating Site (Patient Location): Patient's home    Distant Site (Provider Location): Provider Remote Setting    Consent:  The patient/guardian has verbally consented to: the potential risks and benefits of telemedicine (video visit) versus in person care; bill my insurance or make self-payment for services provided; and responsibility for payment of non-covered services.     Mode of Communication:  Video Conference via [employer]    As the provider I attest to compliance with applicable laws and regulations related to telemedicine.  Psychoeducation Group Note    PATIENT'S NAME: Mariusz Huntley  MRN:   [id number]  :   1985  ACCT. NUMBER: [id number]  DATE OF SERVICE: 21  START TIME: 10:00 AM  END TIME: 10:50 AM  FACILITATOR: Kat Cifuentes LMFT  TOPIC:  RN Group: [facility] Adult Mental Health Day Treatment  TRACK: 2A    NUMBER OF PARTICIPANTS: 7    Summary of Group / Topics Discussed:  Health Maintenance: Weekend planning: Patients were given time to complete a weekend plan of what they will do to promote wellness and sobriety over the weekend when they do not have the structure of group. Patients were encouraged to review progress on their treatment goals and were challenged to identify ways to work toward meeting them. Patients identified and discussed foreseeable barriers to success over the weekend and then developed a plan to overcome them. Patients reviewed their distress coping skills and social support network and discussed this with the group.       Patient Session Goals / Objectives:    ?    Identified activities to engage in that promote balance in wellness  ?    Distinguished possible barriers to success over the weekend and created a plan to  overcome them  ?    Listed distress coping skills and identified social support network to utilize if in crisis during the weekend          Patient Participation / Response:  Fully participated with the group by sharing personal reflections / insights and openly received / provided feedback with other participants.    Demonstrated understanding of topics discussed through group discussion and participation, Identified / Expressed personal readiness to practice skills and Verbalized understanding of health maintenance topic    Treatment Plan:  Patient has a current master individualized treatment plan.  See Epic treatment plan for more information.    KYRA Murray

## 2021-04-12 ENCOUNTER — HOSPITAL ENCOUNTER (OUTPATIENT)
Dept: BEHAVIORAL HEALTH | Facility: CLINIC | Age: 36
End: 2021-04-12
Attending: PSYCHIATRY & NEUROLOGY
Payer: COMMERCIAL

## 2021-04-12 DIAGNOSIS — F33.1 MAJOR DEPRESSIVE DISORDER, RECURRENT EPISODE, MODERATE (H): ICD-10-CM

## 2021-04-12 DIAGNOSIS — F43.10 PTSD (POST-TRAUMATIC STRESS DISORDER): ICD-10-CM

## 2021-04-12 PROCEDURE — 90853 GROUP PSYCHOTHERAPY: CPT | Mod: GT | Performed by: PSYCHOLOGIST

## 2021-04-12 PROCEDURE — 90853 GROUP PSYCHOTHERAPY: CPT | Mod: 95

## 2021-04-12 PROCEDURE — 90853 GROUP PSYCHOTHERAPY: CPT | Mod: 95 | Performed by: PSYCHOLOGIST

## 2021-04-12 NOTE — GROUP NOTE
Psychotherapy Group Note  Telemedicine Visit: The patient's condition can be safely assessed and treated via synchronous audio and visual telemedicine encounter.    Reason for Telemedicine Visit: Patient has requested telehealth visit  Originating Site (Patient Location): Patient's home  Distant Site (Provider Location): [facility] Outpatient Setting: Adult Day Tx  Consent:  The patient/guardian has verbally consented to: the potential risks and benefits of telemedicine (video visit) versus in person care; bill my insurance or make self-payment for services provided; and responsibility for payment of non-covered services.   Mode of Communication:  Video Conference via [employer]  As the provider I attest to compliance with applicable laws and regulations related to telemedicine.    PATIENT'S NAME: Mariusz Huntley  MRN:   [id number]  :   1985  ACCT. NUMBER: [id number]  DATE OF SERVICE: 21  START TIME: 10:00 AM  END TIME: 10:50 AM  FACILITATOR: Milagros Meza PsyD  TOPIC:  EBP Group: Coping Skills  [facility] Adult Mental Health Day Treatment  TRACK: 2A    NUMBER OF PARTICIPANTS: 6    Summary of Group / Topics Discussed:  Coping Skills: Improve the Moment: Patients learned to tolerate distress, by applying strategies to effect positive change in the present moment.  Patients will identified situations where they would benefit from applying strategies to improve the moment and reduce distress. Patients discussed how to distinguish when this can be useful in their lives or when other strategies would be more relevant or helpful.    Patient Session Goals / Objectives:    Discuss how the use of intentional  in the moment  actions can help reduce distress.    Review patients current practices and discuss a more formal way of practicing and accessing skills.    Increase ability to decide when to use improve the moment strategies    Choose 1-2 in the moment actions to apply during times  of distress.        Patient Participation / Response:  Fully participated with the group by sharing personal reflections / insights and openly received / provided feedback with other participants.    Identified barriers to applying coping skills    Treatment Plan:  Patient has a current master individualized treatment plan.  See Epic treatment plan for more information.    Analia Sousa Psy., D,  Licensed Clinical Psychologist
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
## 2021-04-12 NOTE — GROUP NOTE
Psychotherapy Group Note    PATIENT'S NAME: Mariusz Huntley  MRN:   [id number]  :   1985  ACCT. NUMBER: [id number]  DATE OF SERVICE: 21  START TIME: 11:00 AM  END TIME: 11:50 AM  FACILITATOR: Natalie Lugo LICSW  TOPIC: MH EBP Group: Specialty Awareness  [facility] Adult Mental Health Day Treatment  TRACK: 2A    Telemedicine Visit: The patient's condition can be safely assessed and treated via synchronous audio and visual telemedicine encounter.      Reason for Telemedicine Visit: Services only offered telehealth    Originating Site (Patient Location): Patient's home    Distant Site (Provider Location): [facility]: [facility]    Consent:  The patient/guardian has verbally consented to: the potential risks and benefits of telemedicine (video visit) versus in person care; bill my insurance or make self-payment for services provided; and responsibility for payment of non-covered services.     Mode of Communication:  Video Conference via zoom    As the provider I attest to compliance with applicable laws and regulations related to telemedicine.      NUMBER OF PARTICIPANTS: 6    Summary of Group / Topics Discussed:  Specialty Topics: Life Transitions: The topic of life transitions was presented in order to help patients to better understand the challenges presented by life transitions, and how to best navigate them. Exploring the phases of transition and how one works through them was discussed. Patients were provided with information regarding community resources.     Patient Session Goals / Objectives:    Discussed the timing and nature of major life transitions    Explored how life transitions may impact mental health and functioning    Discussed coping strategies to manage symptoms and help with transitioning    Discussed and planned a successful transition        Patient Participation / Response:  Fully participated with the group by sharing personal reflections /  insights and openly received / provided feedback with other participants.    Demonstrated understanding of topics discussed through group discussion and participation    Treatment Plan:  Patient has a current master individualized treatment plan.  See Epic treatment plan for more information.    ESME AlbertSW

## 2021-04-13 ENCOUNTER — HOSPITAL ENCOUNTER (OUTPATIENT)
Dept: BEHAVIORAL HEALTH | Facility: CLINIC | Age: 36
End: 2021-04-13
Attending: PSYCHIATRY & NEUROLOGY
Payer: COMMERCIAL

## 2021-04-13 DIAGNOSIS — F43.10 PTSD (POST-TRAUMATIC STRESS DISORDER): ICD-10-CM

## 2021-04-13 DIAGNOSIS — F33.1 MAJOR DEPRESSIVE DISORDER, RECURRENT EPISODE, MODERATE (H): ICD-10-CM

## 2021-04-13 PROCEDURE — 90853 GROUP PSYCHOTHERAPY: CPT | Mod: 95

## 2021-04-13 PROCEDURE — 90853 GROUP PSYCHOTHERAPY: CPT | Mod: 95 | Performed by: PSYCHOLOGIST
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
## 2021-04-13 NOTE — GROUP NOTE
Psychotherapy Group Note    PATIENT'S NAME: Mariusz Huntley  MRN:   [id number]  :   1985  ACCT. NUMBER: [id number]  DATE OF SERVICE: 21  START TIME: 10:00 AM  END TIME: 10:50 AM  FACILITATOR: Natalie Lugo LICSW  TOPIC: MH EBP Group: Coping Skills  [facility] Adult Mental Health Day Treatment  TRACK: 2A    Telemedicine Visit: The patient's condition can be safely assessed and treated via synchronous audio and visual telemedicine encounter.      Reason for Telemedicine Visit: Services only offered telehealth    Originating Site (Patient Location): Patient's home    Distant Site (Provider Location): [facility]: [facility]    Consent:  The patient/guardian has verbally consented to: the potential risks and benefits of telemedicine (video visit) versus in person care; bill my insurance or make self-payment for services provided; and responsibility for payment of non-covered services.     Mode of Communication:  Video Conference via zoom    As the provider I attest to compliance with applicable laws and regulations related to telemedicine.      NUMBER OF PARTICIPANTS: 5    Summary of Group / Topics Discussed:  Coping Skills: Self-Soothe: Patients learned to apply self-soothe as a way to decrease heightened stress in the moment.  Patients identified situations that necessitate self-soothe strategies.  They focused on ways to manage physical symptoms of distress using the senses. They discussed how to distinguish when this can be useful in their lives when other strategies are more relevant or helpful.    Patient Session Goals / Objectives:    Understand the purpose of using the senses to decrease distress    Process what happens in the body when using self-soothe strategies    Demonstrate understanding of when to use self-soothe strategies    Identify and problem solve barriers to applying self-soothe strategies.    Choose 1-2 self-soothe strategies to apply during times of  distress.        Patient Participation / Response:  Fully participated with the group by sharing personal reflections / insights and openly received / provided feedback with other participants.    Demonstrated understanding of topics discussed through group discussion and participation and Identified / Expressed personal readiness to practice new coping skills    Treatment Plan:  Patient has a current master individualized treatment plan.  See Epic treatment plan for more information.    Natalie Magallon, LICSW

## 2021-04-13 NOTE — GROUP NOTE
Telemedicine Visit: The patient's condition can be safely assessed and treated via synchronous audio and visual telemedicine encounter.      Reason for Telemedicine Visit: Services only offered telehealth    Originating Site (Patient Location): Patient's home    Distant Site (Provider Location): Provider Remote Setting    Consent:  The patient/guardian has verbally consented to: the potential risks and benefits of telemedicine (video visit) versus in person care; bill my insurance or make self-payment for services provided; and responsibility for payment of non-covered services.     Mode of Communication:  Video Conference via Kiyon    As the provider I attest to compliance with applicable laws and regulations related to telemedicine.  Psychoeducation Group Note    PATIENT'S NAME: Mariusz Huntley  MRN:   [id number]  :   1985  ACCT. NUMBER: [id number]  DATE OF SERVICE: 21  START TIME: 11:00 AM  END TIME: 11:50 AM  FACILITATOR: Kat Cifuentes LMFT  TOPIC:  RN Group: Mental Health Maintenance  [facility] Adult Mental Health Day Treatment  TRACK: 2A    NUMBER OF PARTICIPANTS: 4    Summary of Group / Topics Discussed:  Mental Health Maintenance:  Letting Go of Stress: Patients viewed 20 minute video which demonstrated simple proven techniques too quickly and effectively release stress.     Patient Session Goals / Objectives:  ? Patients discovered quick, easy and effective stress reduction techniques  ? Patients practiced techniques that were demonstrated on the video  ? Patients identified one technique they will use to cope with symptoms          Patient Participation / Response:  Fully participated with the group by sharing personal reflections / insights and openly received / provided feedback with other participants.    Demonstrated understanding of topics discussed through group discussion and participation, Identified / Expressed personal readiness to practice skills and  Verbalized understanding of mental health maintenance topic    Treatment Plan:  Patient has a current master individualized treatment plan.  See Epic treatment plan for more information.    KYRA Murray

## 2021-04-15 ENCOUNTER — HOSPITAL ENCOUNTER (OUTPATIENT)
Dept: BEHAVIORAL HEALTH | Facility: CLINIC | Age: 36
End: 2021-04-15
Attending: PSYCHIATRY & NEUROLOGY
Payer: COMMERCIAL

## 2021-04-15 DIAGNOSIS — F33.1 MAJOR DEPRESSIVE DISORDER, RECURRENT EPISODE, MODERATE (H): ICD-10-CM

## 2021-04-15 DIAGNOSIS — F43.10 PTSD (POST-TRAUMATIC STRESS DISORDER): ICD-10-CM

## 2021-04-15 PROCEDURE — 90853 GROUP PSYCHOTHERAPY: CPT | Mod: 95

## 2021-04-15 PROCEDURE — 90853 GROUP PSYCHOTHERAPY: CPT | Mod: GT | Performed by: PSYCHOLOGIST

## 2021-04-15 PROCEDURE — 90853 GROUP PSYCHOTHERAPY: CPT | Mod: 95 | Performed by: PSYCHOLOGIST

## 2021-04-15 NOTE — GROUP NOTE
Telemedicine Visit: The patient's condition can be safely assessed and treated via synchronous audio and visual telemedicine encounter.      Reason for Telemedicine Visit: Services only offered telehealth    Originating Site (Patient Location): Patient's home    Distant Site (Provider Location): Provider Remote Setting    Consent:  The patient/guardian has verbally consented to: the potential risks and benefits of telemedicine (video visit) versus in person care; bill my insurance or make self-payment for services provided; and responsibility for payment of non-covered services.     Mode of Communication:  Video Conference via kontoblick    As the provider I attest to compliance with applicable laws and regulations related to telemedicine.  Psychotherapy Group Note    PATIENT'S NAME: Mariusz Huntley  MRN:   [id number]  :   1985  ACCT. NUMBER: [id number]  DATE OF SERVICE: 4/15/21  START TIME: 10:00 AM  END TIME: 10:50 AM  FACILITATOR: Kat Cifuentes LMFT  TOPIC:  EBP Group: Coping Skills  [facility] Adult Mental Health Day Treatment  TRACK: 2A    NUMBER OF PARTICIPANTS: 4    Summary of Group / Topics Discussed:  Coping Skills: Improve the Moment: Patients learned to tolerate distress, by applying strategies to effect positive change in the present moment.  Patients will identified situations where they would benefit from applying strategies to improve the moment and reduce distress. Patients discussed how to distinguish when this can be useful in their lives or when other strategies would be more relevant or helpful.    Patient Session Goals / Objectives:    Discuss how the use of intentional  in the moment  actions can help reduce distress.    Review patients current practices and discuss a more formal way of practicing and accessing skills.    Increase ability to decide when to use improve the moment strategies    Choose 1-2 in the moment actions to apply during times of  distress.        Patient Participation / Response:  Fully participated with the group by sharing personal reflections / insights and openly received / provided feedback with other participants.    Demonstrated understanding of topics discussed through group discussion and participation and Practiced 2-3 new coping skills in session    Treatment Plan:  Patient has a current master individualized treatment plan.  See Epic treatment plan for more information.    KYRA Murray

## 2021-04-15 NOTE — GROUP NOTE
Psychotherapy Group Note  Telemedicine Visit: The patient's condition can be safely assessed and treated via synchronous audio and visual telemedicine encounter.    Reason for Telemedicine Visit: Patient has requested telehealth visit  Originating Site (Patient Location): Patient's home  Distant Site (Provider Location): [facility] Outpatient Setting: Adult Day Tx  Consent:  The patient/guardian has verbally consented to: the potential risks and benefits of telemedicine (video visit) versus in person care; bill my insurance or make self-payment for services provided; and responsibility for payment of non-covered services.   Mode of Communication:  Video Conference via Aniika  As the provider I attest to compliance with applicable laws and regulations related to telemedicine.    PATIENT'S NAME: Mariusz Huntley  MRN:   [id number]  :   1985  ACCT. NUMBER: [id number]  DATE OF SERVICE: 4/15/21  START TIME: 11:00 AM  END TIME: 11:50 AM  FACILITATOR: Milagros Meza PsyD  TOPIC:  EBP Group: Coping Skills  [facility] Adult Mental Health Day Treatment  TRACK: 2A    NUMBER OF PARTICIPANTS: 4    Summary of Group / Topics Discussed:  Coping Skills: Distraction: Patients learned to mindfully use distraction as a way to decrease heightened stress in the moment.  Patients will identified situations that necessitate healthy distraction strategies.  They explored ways to manage physical symptoms of distress using distraction. The group began to distinguish when this can be useful in their lives or when other strategies would be more relevant or helpful.    Patient Session Goals / Objectives:    Understand the purpose and benefits of using healthy distraction to decrease distress.    Process what happens in the body when using distraction strategies.    Demonstrate understanding of when to use distraction strategies.    Explore patient s current distraction activities, and how to take a more  intentional approach to the use of distraction.    Identify and problem solve barriers to applying distraction strategies.    Choose 1-2 healthy distraction strategies to apply during times of distress.        Patient Participation / Response:  Fully participated with the group by sharing personal reflections / insights and openly received / provided feedback with other participants.    Identified barriers to applying coping skills    Treatment Plan:  Patient has a current master individualized treatment plan.  See Epic treatment plan for more information.    Analia Sousa Psy., D,  Licensed Clinical Psychologist
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
## 2021-04-15 NOTE — PROGRESS NOTES
Psychotherapy Group Note  Telemedicine Visit: The patient's condition can be safely assessed and treated via synchronous audio and visual telemedicine encounter.    Reason for Telemedicine Visit: Patient has requested telehealth visit  Originating Site (Patient Location): Patient's home  Distant Site (Provider Location): [facility] Outpatient Setting: Adult Day Tx  Consent:  The patient/guardian has verbally consented to: the potential risks and benefits of telemedicine (video visit) versus in person care; bill my insurance or make self-payment for services provided; and responsibility for payment of non-covered services.   Mode of Communication:  Video Conference via [employer]  As the provider I attest to compliance with applicable laws and regulations related to telemedicine.     PATIENT'S NAME:    Mariusz Duke  MRN:                          [id number]  :                           1980  ACCT. NUMBER:       [id number]  DATE OF SERVICE:  4/15/21  START TIME: 11:00 AM  END TIME: 11:50 AM  FACILITATOR: Milagros Meza PsyD  TOPIC: MH EBP Group: Emotions Management  [facility] Adult Mental Health Day Treatment  TRACK: 2A     NUMBER OF PARTICIPANTS: 4     Summary of Group / Topics Discussed:  Distraction:  Management of Emotions and ruminating thoughts through use of [employer] ACCEPTS, from DBT.     Patient Session Goals / Objectives:    Review DBT concepts and focus on patient s experiences of distress and difficult emotional experiences.    Learn how to do the ACCEPTS examples of what an emotion makes us want to do in an effort to decrease an unwanted emotional experience.    Demonstrate understanding of the skill of ACCEPTS by sharing experiences where the technique could be useful in past / present situations.        Patient Participation / Response:  Fully participated with the group by sharing personal reflections / insights and openly received / provided feedback with other  participants.     Self-aware of experiences with difficult emotions, and strategies to employ to manage them     Treatment Plan:  Patient has a current master individualized treatment plan.  See Epic treatment plan for more information.     Analia Sousa Psy., D,  Licensed Clinical Psychologist

## 2021-04-19 ENCOUNTER — HOSPITAL ENCOUNTER (OUTPATIENT)
Dept: BEHAVIORAL HEALTH | Facility: CLINIC | Age: 36
End: 2021-04-19
Attending: PSYCHIATRY & NEUROLOGY
Payer: COMMERCIAL

## 2021-04-19 DIAGNOSIS — F43.10 PTSD (POST-TRAUMATIC STRESS DISORDER): ICD-10-CM

## 2021-04-19 DIAGNOSIS — F33.1 MAJOR DEPRESSIVE DISORDER, RECURRENT EPISODE, MODERATE (H): ICD-10-CM

## 2021-04-19 PROCEDURE — 90853 GROUP PSYCHOTHERAPY: CPT | Mod: 95 | Performed by: PSYCHOLOGIST

## 2021-04-19 NOTE — ADDENDUM NOTE
Encounter addended by: Milagros Meza PsyD on: 4/19/2021 4:03 PM   Actions taken: Clinical Note Signed, Flowsheet accepted

## 2021-04-19 NOTE — PROGRESS NOTES
Acknowledgement of Current Treatment Plan       I have reviewed my treatment plan with my therapist / counselor on 4/19/2021  .   I agree with the plan as it is written in the electronic health record. (2A)    Name:      Signature:  Mariusz Huntley Unable to sign due to COVID19     Dr Edwardo Urena MD  Psychiatrist    Selene Head.FEDE,  Licensed Psychologist   Sheryl Head, D,  Licensed Clinical Psychologist

## 2021-04-19 NOTE — GROUP NOTE
Psychotherapy Group Note  Telemedicine Visit: The patient's condition can be safely assessed and treated via synchronous audio and visual telemedicine encounter.    Reason for Telemedicine Visit: Patient has requested telehealth visit  Originating Site (Patient Location): Patient's home  Distant Site (Provider Location): [facility] Outpatient Setting: Adult Day Tx  Consent:  The patient/guardian has verbally consented to: the potential risks and benefits of telemedicine (video visit) versus in person care; bill my insurance or make self-payment for services provided; and responsibility for payment of non-covered services.   Mode of Communication:  Video Conference via [employer]  As the provider I attest to compliance with applicable laws and regulations related to telemedicine.    PATIENT'S NAME: Mariusz Huntley  MRN:   [id number]  :   1985  ACCT. NUMBER: [id number]  DATE OF SERVICE: 21  START TIME: 10:00 AM  END TIME: 10:50 AM  FACILITATOR: Milagros Meza PsyD  TOPIC:  EBP Group: Coping Skills  [facility] Adult Mental Health Day Treatment  TRACK: 2A    NUMBER OF PARTICIPANTS: 5    Summary of Group / Topics Discussed:  Coping Skills: Improve the Moment: Patients learned to tolerate distress, by applying strategies to effect positive change in the present moment.  Patients will identified situations where they would benefit from applying strategies to improve the moment and reduce distress. Patients discussed how to distinguish when this can be useful in their lives or when other strategies would be more relevant or helpful.    Patient Session Goals / Objectives:    Discuss how the use of intentional  in the moment  actions can help reduce distress.    Review patients current practices and discuss a more formal way of practicing and accessing skills.    Increase ability to decide when to use improve the moment strategies    Choose 1-2 in the moment actions to apply during times  of distress.        Patient Participation / Response:  Fully participated with the group by sharing personal reflections / insights and openly received / provided feedback with other participants.    Identified 2-3 positive coping strategies that have helped maintain / improve symptoms in the past    Treatment Plan:  Patient has a current master individualized treatment plan.  See Epic treatment plan for more information.    Analia Sousa Psy., D,  Licensed Clinical Psychologist

## 2021-04-19 NOTE — GROUP NOTE
Process Group Note  Telemedicine Visit: The patient's condition can be safely assessed and treated via synchronous audio and visual telemedicine encounter.    Reason for Telemedicine Visit: Patient has requested telehealth visit  Originating Site (Patient Location): Patient's home  Distant Site (Provider Location): [facility] Outpatient Setting: Adult Day Tx  Consent:  The patient/guardian has verbally consented to: the potential risks and benefits of telemedicine (video visit) versus in person care; bill my insurance or make self-payment for services provided; and responsibility for payment of non-covered services.   Mode of Communication:  Video Conference via [employer]  As the provider I attest to compliance with applicable laws and regulations related to telemedicine.    PATIENT'S NAME: Mariusz Huntley  MRN:   [id number]  :   1985  ACCT. NUMBER: [id number]  DATE OF SERVICE: 21  START TIME:  9:00 AM  END TIME:  9:50 AM  FACILITATOR: Milagros Meza PsyD  TOPIC:  Process Group    Diagnoses:  296.32 Major Depressive Disorder, Moderate  309.81 PTSD  R/O Panic Disorder    Psychiatry: [facility] [place]  Therapy: Dr. Casper, [facility]  PCP:  ......      [facility] Adult Mental Health Day Treatment  TRACK: 2A    NUMBER OF PARTICIPANTS: 5          Data:    Session content: At the start of this group, patients were invited to check in by identifying themselves, describing their current emotional status, and identifying issues to address in this group.   Area(s) of treatment focus addressed in this session included Symptom Management, Personal Safety, Community Resources/Discharge Planning and Abstinence/Relapse Prevention.  Client reported being safe today.  Reported mood is depressed.     Goal for today is to attend therapy groups.  The client talked to the group about how she felt ashamed that her kids and the neighbors saw all the police at her  "house and handcuffed her  looking for marijuana..  She reported that she thought the neighbors hated her family, since some would no longer say hello or wave to her from the street, and that they wouldn't let her daughter pet a dog.  She reported that she and her daughter went to a park and picked up litter on the past weekend.       Therapeutic Interventions/Treatment Strategies:  Psychotherapist reinforced use of skills. Treatment modalities used include Cognitive Behavioral Therapy and Dialectical Behavioral Therapy. Interventions include Coping Skills: Discussed how the use of intentional \"in the moment\" actions can help reduce distress, Relapse Prevention: Assisted patient in identifying personal vulnerabilities, thoughts, emotions, and situations that may lead to relapse  and Mindfulness: Encouraged a plan to use mindfulness skills in daily life.    Assessment:    Patient response:   Patient responded to session by listening, focusing on goals and being attentive    Possible barriers to participation / learning include: severity of symptoms    Health Issues:   Yes: Pain, Associated Psychological Distress       Substance Use Review:   Substance Use: No active concerns identified.    Mental Status/Behavioral Observations  Appearance:   Appropriate   Eye Contact:   Good   Psychomotor Behavior: Normal   Attitude:   Cooperative   Orientation:   All  Speech   Rate / Production: Normal    Volume:  Normal   Mood:    Depressed  Sad   Affect:    Constricted   Thought Content:   Rumination  Thought Form:  Coherent  Logical     Insight:    Good     Plan:     Safety Plan: Recommended that patient call 911 or go to the local ED should there be a change in any of these risk factors.     Barriers to treatment: None identified    Patient Contracts (see media tab):  None    Substance Use: Provided encouragement towards sobriety     Continue or Discharge: Patient will continue in Adult Day Treatment (ADT)  as planned. " Patient is likely to benefit from learning and using skills as they work toward the goals identified in their treatment plan.      Milagros Meza PsyD  April 19, 2021  Sheryl Head, FEDE,  Licensed Clinical Psychologist

## 2021-04-19 NOTE — GROUP NOTE
Psychotherapy Group Note  Telemedicine Visit: The patient's condition can be safely assessed and treated via synchronous audio and visual telemedicine encounter.    Reason for Telemedicine Visit: Patient has requested telehealth visit  Originating Site (Patient Location): Patient's home  Distant Site (Provider Location): [facility] Outpatient Setting: Adult Day Tx  Consent:  The patient/guardian has verbally consented to: the potential risks and benefits of telemedicine (video visit) versus in person care; bill my insurance or make self-payment for services provided; and responsibility for payment of non-covered services.   Mode of Communication:  Video Conference via [employer]  As the provider I attest to compliance with applicable laws and regulations related to telemedicine.    PATIENT'S NAME: Mariusz Huntley  MRN:   [id number]  :   1985  ACCT. NUMBER: [id number]  DATE OF SERVICE: 21  START TIME: 11:00 AM  END TIME: 11:50 AM  FACILITATOR: Milagros Meza PsyD  TOPIC:  EBP Group: Coping Skills  [facility] Adult Mental Health Day Treatment  TRACK: 2A    NUMBER OF PARTICIPANTS: 2A    Summary of Group / Topics Discussed:  Coping Skills: Relapse Planning: Patients discussed and increased understanding of how anticipating and planning for possible increased symptoms is a proactive way to reduce the likelihood of relapse.  Patients shared individualized factors that may lead to increased symptoms and decompensation in functioning.  Each patient developed a relapse prevention plan designed to help them recognize when they may have increasing symptoms requiring them to take action and notify their key supports and care team.      Patient Session Goals / Objectives:    Identify and understand what factors may contribute to symptom relapse.    Identify actions that may be taken to manage increased symptoms/stressors.    Create an individualized written relapse plan    Problem solve  barriers to plan creation and implementation    Share relapse plan with key support people        Patient Participation / Response:  Fully participated with the group by sharing personal reflections / insights and openly received / provided feedback with other participants.    Demonstrated knowledge of when to consider using a variety of coping skills in daily life    Treatment Plan:  Patient has a current master individualized treatment plan.  See Epic treatment plan for more information.    Analia Sousa Psy., D,  Licensed Clinical Psychologist

## 2021-04-20 ENCOUNTER — HOSPITAL ENCOUNTER (OUTPATIENT)
Dept: BEHAVIORAL HEALTH | Facility: CLINIC | Age: 36
End: 2021-04-20
Attending: PSYCHIATRY & NEUROLOGY
Payer: COMMERCIAL

## 2021-04-20 DIAGNOSIS — F43.10 PTSD (POST-TRAUMATIC STRESS DISORDER): ICD-10-CM

## 2021-04-20 DIAGNOSIS — F33.1 MAJOR DEPRESSIVE DISORDER, RECURRENT EPISODE, MODERATE (H): ICD-10-CM

## 2021-04-20 PROCEDURE — 90853 GROUP PSYCHOTHERAPY: CPT | Mod: GT | Performed by: PSYCHOLOGIST

## 2021-04-20 PROCEDURE — 90853 GROUP PSYCHOTHERAPY: CPT | Mod: GT

## 2021-04-20 PROCEDURE — 90853 GROUP PSYCHOTHERAPY: CPT | Mod: 95 | Performed by: PSYCHOLOGIST

## 2021-04-20 NOTE — GROUP NOTE
Psychotherapy Group Note  Telemedicine Visit: The patient's condition can be safely assessed and treated via synchronous audio and visual telemedicine encounter.    Reason for Telemedicine Visit: Patient has requested telehealth visit  Originating Site (Patient Location): Patient's home  Distant Site (Provider Location): [facility] Outpatient Setting: Adult Day Tx  Consent:  The patient/guardian has verbally consented to: the potential risks and benefits of telemedicine (video visit) versus in person care; bill my insurance or make self-payment for services provided; and responsibility for payment of non-covered services.   Mode of Communication:  Video Conference via [employer]  As the provider I attest to compliance with applicable laws and regulations related to telemedicine.    PATIENT'S NAME: Mariusz Huntley  MRN:   [id number]  :   1985  ACCT. NUMBER: [id number]  DATE OF SERVICE: 21  START TIME: 10:00 AM  END TIME: 10:50 AM  FACILITATOR: Milagros Meza PsyD  TOPIC:  EBP Group: Coping Skills  [facility] Adult Mental Health Day Treatment  TRACK: 2A    NUMBER OF PARTICIPANTS: 5    Summary of Group / Topics Discussed:  Coping Skills: Self-Soothe: Patients learned to apply self-soothe as a way to decrease heightened stress in the moment.  Patients identified situations that necessitate self-soothe strategies.  They focused on ways to manage physical symptoms of distress using the senses. They discussed how to distinguish when this can be useful in their lives when other strategies are more relevant or helpful.    Patient Session Goals / Objectives:    Understand the purpose of using the senses to decrease distress    Process what happens in the body when using self-soothe strategies    Demonstrate understanding of when to use self-soothe strategies    Identify and problem solve barriers to applying self-soothe strategies.    Choose 1-2 self-soothe strategies to apply during times  of distress.        Patient Participation / Response:  Fully participated with the group by sharing personal reflections / insights and openly received / provided feedback with other participants.    Identified barriers to applying coping skills    Treatment Plan:  Patient has a current master individualized treatment plan.  See Epic treatment plan for more information.    Analia Sousa Psy., D,  Licensed Clinical Psychologist

## 2021-04-20 NOTE — GROUP NOTE
Process Group Note  Telemedicine Visit: The patient's condition can be safely assessed and treated via synchronous audio and visual telemedicine encounter.    Reason for Telemedicine Visit: Patient has requested telehealth visit  Originating Site (Patient Location): Patient's home  Distant Site (Provider Location): [facility] Outpatient Setting: Adult Day Tx  Consent:  The patient/guardian has verbally consented to: the potential risks and benefits of telemedicine (video visit) versus in person care; bill my insurance or make self-payment for services provided; and responsibility for payment of non-covered services.   Mode of Communication:  Video Conference via [employer]  As the provider I attest to compliance with applicable laws and regulations related to telemedicine.    PATIENT'S NAME: Mariusz Huntley  MRN:   [id number]  :   1985  ACCT. NUMBER: [id number]  DATE OF SERVICE: 21  START TIME:  9:00 AM  END TIME:  9:50 AM  FACILITATOR: Milagros Meza PsyD  TOPIC:  Process Group    Diagnoses:  296.32 Major Depressive Disorder, Moderate  309.81 PTSD  R/O Panic Disorder    [facility]iatry: [facility] [place]  Therapy: Dr. Casper, [facility]  PCP:  ......      [facility] Adult Mental Health Day Treatment  TRACK: 2A    NUMBER OF PARTICIPANTS: 6          Data:    Session content: At the start of this group, patients were invited to check in by identifying themselves, describing their current emotional status, and identifying issues to address in this group.   Area(s) of treatment focus addressed in this session included Symptom Management, Personal Safety and Community Resources/Discharge Planning.  Client reported being safe today.  Reported mood is depressed.    Goal for today is to attend therapy groups. The client talked to the group about how she is scared of the situation in the [facility] with the Jose Maria Zachary trial and the police shooting protests in the  "[facility]. She reported living close to the protests and that she is frightened by them and the presence of the [employer]. She reported feeling like she was in a \"dark place.\"  She stated that she will see her therapist tomorrow. She talked about going to her cabin to get away from the stress, and doing some tie dye projects. She talked with others about music, watching a baseball game and reaching out to friends.      Therapeutic Interventions/Treatment Strategies:  Psychotherapist offered support, feedback and validation and reinforced use of skills. Treatment modalities used include Cognitive Behavioral Therapy and Dialectical Behavioral Therapy. Interventions include Coping Skills: Reviewed patients current calming practices and discussed a more formal way of practicing and accessing skills, Relapse Prevention: Assisted patient in identifying personal vulnerabilities, thoughts, emotions, and situations that may lead to relapse  and Mindfulness: Encouraged a plan to use mindfulness skills in daily life.    Assessment:    Patient response:   Patient responded to session by listening, focusing on goals and accepting support    Possible barriers to participation / learning include: severity of symptoms    Health Issues:   None reported       Substance Use Review:   Substance Use: No active concerns identified.    Mental Status/Behavioral Observations  Appearance:   Appropriate   Eye Contact:   Good   Psychomotor Behavior: Normal   Attitude:   Cooperative   Orientation:   All  Speech   Rate / Production: Normal    Volume:  Normal   Mood:    Anxious  Depressed  Sad   Affect:    Constricted   Thought Content:   Rumination  Thought Form:  Coherent  Logical     Insight:    Good     Plan:     Safety Plan: Recommended that patient call 911 or go to the local ED should there be a change in any of these risk factors.     Barriers to treatment: None identified    Patient Contracts (see media tab):  None    Substance " Use: Provided encouragement towards sobriety     Continue or Discharge: Patient will continue in Adult Day Treatment (ADT)  as planned. Patient is likely to benefit from learning and using skills as they work toward the goals identified in their treatment plan.      Milargos Meza PsyD  April 20, 2021  Sheryl Head, FEDE,  Licensed Clinical Psychologist

## 2021-04-20 NOTE — PROGRESS NOTES
Patient Active Problem List   Diagnosis     Anxiety     PTSD (post-traumatic stress disorder)     Major depressive disorder, recurrent episode, moderate (H)     Posttraumatic stress disorder       Current Outpatient Medications:      acetaminophen (TYLENOL) 325 MG tablet, Take 2 tablets (650 mg) by mouth every 4 hours as needed for mild pain (to moderate pain), Disp:  , Rfl:      albuterol (PROAIR HFA/PROVENTIL HFA/VENTOLIN HFA) 108 (90 Base) MCG/ACT inhaler, Inhale 2 puffs into the lungs every 4 hours as needed for shortness of breath / dyspnea or wheezing (only needed since recovering from covid several months ago), Disp: , Rfl:      cyclobenzaprine (FLEXERIL) 5 MG tablet, Take 1 tablet (5 mg) by mouth 3 times daily as needed for muscle spasms, Disp: 90 tablet, Rfl: 1     divalproex sodium extended-release (DEPAKOTE ER) 250 MG 24 hr tablet, Take 1 po at bedtime x 2 days then 2 po at bedtime x 2 days then 3 po at bedtime x 2 days then 4 po qHS, Disp: 120 tablet, Rfl: 0     escitalopram (LEXAPRO) 10 MG tablet, Take 1 tablet (10 mg) by mouth daily, Disp: 30 tablet, Rfl: 1     gabapentin (NEURONTIN) 600 MG tablet, Take 800 mg by mouth 3 times daily , Disp: , Rfl:      gabapentin (NEURONTIN) 600 MG tablet, Take 1 tablet (600 mg) by mouth 4 times daily, Disp: 120 tablet, Rfl: 1     hydrOXYzine (ATARAX) 50 MG tablet, Take 2 tablets (100 mg) by mouth every 4 hours as needed for anxiety, Disp: 180 tablet, Rfl: 1     ibuprofen (ADVIL/MOTRIN) 600 MG tablet, Take 1 tablet (600 mg) by mouth every 6 hours as needed for moderate pain, Disp:  , Rfl:      LORazepam (ATIVAN) 2 MG tablet, Take 2 mg by mouth every 6 hours as needed for anxiety Reports 10 mg ??, Disp: , Rfl:      melatonin 3 MG tablet, Take 1-2 tablets (3-6 mg) by mouth nightly as needed for sleep, Disp: 60 tablet, Rfl: 1     methyl salicylate-menthol (TONY-OCAMPO) OINT ointment, Apply 1 g topically every 4 hours as needed (pain, apply to back), Disp: , Rfl:      Multiple  Vitamins-Minerals (MULTIVITAMIN GUMMIES ADULT PO), Take 4 tablets by mouth daily, Disp: , Rfl:      ondansetron (ZOFRAN-ODT) 4 MG ODT tab, Take 4 mg by mouth every 6 hours as needed for nausea or vomiting, Disp: , Rfl:      polyethylene glycol (MIRALAX) 17 GM/Dose powder, Take 17 g by mouth daily, Disp: , Rfl:      propranolol (INDERAL) 40 MG tablet, Take 1 tablet (40 mg) by mouth 2 times daily, Disp: 60 tablet, Rfl: 1     senna-docusate (SENOKOT-S/PERICOLACE) 8.6-50 MG tablet, Take 1 tablet by mouth 2 times daily as needed for constipation, Disp:  , Rfl:      traZODone (DESYREL) 100 MG tablet, Take 100 mg by mouth At Bedtime, Disp: , Rfl:      traZODone (DESYREL) 100 MG tablet, Take 100 mg by mouth At Bedtime, Disp: , Rfl:      Vitamin D3 (CHOLECALCIFEROL) 25 mcg (1000 units) tablet, Take 2 tablets (50 mcg) by mouth daily, Disp: 60 tablet, Rfl: 1  Psychiatry staffing: case discussed  Diagnosis:  As above;  Issues with aftermath of police contact.  Hx of concussions

## 2021-04-20 NOTE — GROUP NOTE
Telemedicine Visit: The patient's condition can be safely assessed and treated via synchronous audio and visual telemedicine encounter.      Reason for Telemedicine Visit: Services only offered telehealth    Originating Site (Patient Location): Patient's home    Distant Site (Provider Location): Provider Remote Setting    Consent:  The patient/guardian has verbally consented to: the potential risks and benefits of telemedicine (video visit) versus in person care; bill my insurance or make self-payment for services provided; and responsibility for payment of non-covered services.     Mode of Communication:  Video Conference via Sun-eee    As the provider I attest to compliance with applicable laws and regulations related to telemedicine.  Psychotherapy Group Note    PATIENT'S NAME: Mariusz Huntley  MRN:   [id number]  :   1985  ACCT. NUMBER: [id number]  DATE OF SERVICE: 21  START TIME: 11:00 AM  END TIME: 11:50 AM  FACILITATOR: Kat Cifuentes LMFT  TOPIC:  EBP Group: Behavioral Activation  [facility] Adult Mental Health Day Treatment  TRACK: 2A    NUMBER OF PARTICIPANTS: 5    Summary of Group / Topics Discussed:  Behavioral Activation: Motivation and Procrastination: Patients explored how they currently spend their time, identifying thoughts and feelings that are motivating and serve to increase desired behaviors.  They also examined behaviors that contribute to procrastination.  Different types of procrastination behaviors were identified, and strategies to reduce individual procrastination and increase motivation were explored and practiced.  Patients identified ways to increase goal-directed activities to enhance mood and reduce symptoms.        Patient Session Goals / Objectives:    Identify current patterns of procrastination behavior and how they influence thoughts and moods, and inhibit motivation.    Identify behaviors that can be implemented that contribute to improving  thoughts and feelings, motivation, and reduce symptoms.    Identify and develop a plan to increase activities that promote a sense of accomplishment and competence.    Practice scheduling positive activities / behaviors into daily routines.      Patient Participation / Response:  Moderately participated, sharing some personal reflections / insights and adequately adequately received / provided feedback with other participants.    Demonstrated understanding of topics discussed through group discussion and participation, Shared experiences and challenges with making behavioral changes and Practiced skills in session    Treatment Plan:  Patient has a current master individualized treatment plan.  See Epic treatment plan for more information.    KYRA Murray

## 2021-04-22 ENCOUNTER — HOSPITAL ENCOUNTER (OUTPATIENT)
Dept: BEHAVIORAL HEALTH | Facility: CLINIC | Age: 36
End: 2021-04-22
Attending: PSYCHIATRY & NEUROLOGY
Payer: COMMERCIAL

## 2021-04-22 DIAGNOSIS — F33.1 MAJOR DEPRESSIVE DISORDER, RECURRENT EPISODE, MODERATE (H): ICD-10-CM

## 2021-04-22 DIAGNOSIS — F43.10 PTSD (POST-TRAUMATIC STRESS DISORDER): ICD-10-CM

## 2021-04-22 PROCEDURE — 90853 GROUP PSYCHOTHERAPY: CPT | Mod: 95 | Performed by: PSYCHOLOGIST

## 2021-04-22 PROCEDURE — 90853 GROUP PSYCHOTHERAPY: CPT | Mod: 95 | Performed by: SOCIAL WORKER

## 2021-04-22 PROCEDURE — 90853 GROUP PSYCHOTHERAPY: CPT | Mod: 95
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
## 2021-04-22 NOTE — GROUP NOTE
Telemedicine Visit: The patient's condition can be safely assessed and treated via synchronous audio and visual telemedicine encounter.      Reason for Telemedicine Visit: Services only offered telehealth    Originating Site (Patient Location): Patient's home    Distant Site (Provider Location): Provider Remote Setting    Consent:  The patient/guardian has verbally consented to: the potential risks and benefits of telemedicine (video visit) versus in person care; bill my insurance or make self-payment for services provided; and responsibility for payment of non-covered services.     Mode of Communication:  Video Conference via Quizens    As the provider I attest to compliance with applicable laws and regulations related to telemedicine.  Psychotherapy Group Note    PATIENT'S NAME: Mariusz Huntley  MRN:   [id number]  :   1985  ACCT. NUMBER: [id number]  DATE OF SERVICE: 21  START TIME: 10:00 AM  END TIME: 10:50 AM  FACILITATOR: Kat Cifuentes LMFT  TOPIC:  EBP Group: Coping Skills  [facility] Adult Mental Health Day Treatment  TRACK: 2A    NUMBER OF PARTICIPANTS: 5    Summary of Group / Topics Discussed:  Coping Skills: Relapse Planning: Patients discussed and increased understanding of how anticipating and planning for possible increased symptoms is a proactive way to reduce the likelihood of relapse.  Patients shared individualized factors that may lead to increased symptoms and decompensation in functioning.  Each patient developed a relapse prevention plan designed to help them recognize when they may have increasing symptoms requiring them to take action and notify their key supports and care team.      Patient Session Goals / Objectives:    Identify and understand what factors may contribute to symptom relapse.    Identify actions that may be taken to manage increased symptoms/stressors.    Create an individualized written relapse plan    Problem solve barriers to plan creation  and implementation    Share relapse plan with key support people        Patient Participation / Response:  Fully participated with the group by sharing personal reflections / insights and openly received / provided feedback with other participants.    Demonstrated understanding of topics discussed through group discussion and participation, Expressed understanding of the relevance / importance of coping skills at distressing times in life and Demonstrated knowledge of when to consider using a variety of coping skills in daily life    Treatment Plan:  Patient has a current master individualized treatment plan.  See Epic treatment plan for more information.    KYRA Murray

## 2021-04-22 NOTE — GROUP NOTE
Psychotherapy Group Note    PATIENT'S NAME: Mariusz Huntley  MRN:   [id number]  :   1985  ACCT. NUMBER: [id number]  DATE OF SERVICE: 21  START TIME: 11:00 AM  END TIME: 11:50 AM  FACILITATOR: Karine Negrete LICSW  TOPIC: MH EBP Group: Behavioral Activation  [facility] Adult Mental Health Day Treatment  TRACK: 2A    NUMBER OF PARTICIPANTS: 5    Summary of Group / Topics Discussed:  Behavioral Activation: [place] Ahead: {Patients identified situations that prompt unwanted and unhelpful emotions / thoughts / behaviors.   Patients discussed how to problem solve by proactively using coping skills in potentially difficult situations. Components included describing the situation, brainstorming coping skills, imagining how scenario can/will unfold, rehearsing the action plan, and practicing relaxation to follow.  Patients practiced using these skills to reduce symptom distress and increase effective coping  behaviors.      Patient Session Goals / Objectives:    Identify difficult situation(s), and gain proficiency with alternative behaviors / skills to problem solve.    Increase confidence using coping skills through group practice in session.    Receive and provide feedback regarding skill development.    Apply coping skills in daily life situations.    Telemedicine Visit: The patient's condition can be safely assessed and treated via synchronous audio and visual telemedicine encounter.      Reason for Telemedicine Visit: Services only offered telehealth    Originating Site (Patient Location): Patient's home    Distant Site (Provider Location): Provider Remote Setting    Consent:  The patient/guardian has verbally consented to: the potential risks and benefits of telemedicine (video visit) versus in person care; bill my insurance or make self-payment for services provided; and responsibility for payment of non-covered services.     Mode of Communication:  Video Conference via  Inocente    As the provider I attest to compliance with applicable laws and regulations related to telemedicine.       Patient Participation / Response:  Fully participated with the group by sharing personal reflections / insights and openly received / provided feedback with other participants.    Expressed understanding of the relationship between behaviors, thoughts, and feelings    Treatment Plan:  Patient has a current master individualized treatment plan.  See Epic treatment plan for more information.    Karine Negrete, [facility] HospitalSW

## 2021-04-26 ENCOUNTER — HOSPITAL ENCOUNTER (OUTPATIENT)
Dept: BEHAVIORAL HEALTH | Facility: CLINIC | Age: 36
End: 2021-04-26
Attending: PSYCHIATRY & NEUROLOGY
Payer: COMMERCIAL

## 2021-04-26 ENCOUNTER — TELEPHONE (OUTPATIENT)
Dept: BEHAVIORAL HEALTH | Facility: CLINIC | Age: 36
End: 2021-04-26

## 2021-04-26 ENCOUNTER — HOSPITAL ENCOUNTER (INPATIENT)
Facility: CLINIC | Age: 36
LOS: 9 days | Discharge: HOME | DRG: 885 | End: 2021-05-05
Attending: PSYCHIATRY & NEUROLOGY | Admitting: PSYCHIATRY & NEUROLOGY
Payer: COMMERCIAL

## 2021-04-26 DIAGNOSIS — F33.1 MAJOR DEPRESSIVE DISORDER, RECURRENT EPISODE, MODERATE (H): ICD-10-CM

## 2021-04-26 DIAGNOSIS — F51.01 PRIMARY INSOMNIA: Primary | ICD-10-CM

## 2021-04-26 DIAGNOSIS — M62.838 MUSCLE SPASM: ICD-10-CM

## 2021-04-26 DIAGNOSIS — R45.851 SUICIDAL IDEATION: ICD-10-CM

## 2021-04-26 DIAGNOSIS — Z11.52 ENCOUNTER FOR SCREENING LABORATORY TESTING FOR SEVERE ACUTE RESPIRATORY SYNDROME CORONAVIRUS 2 (SARS-COV-2): ICD-10-CM

## 2021-04-26 DIAGNOSIS — F43.10 PTSD (POST-TRAUMATIC STRESS DISORDER): ICD-10-CM

## 2021-04-26 LAB
ALBUMIN UR-MCNC: NEGATIVE MG/DL
AMPHETAMINES UR QL SCN: NEGATIVE
APPEARANCE UR: CLEAR
BACTERIA #/AREA URNS HPF: ABNORMAL /HPF
BARBITURATES UR QL: NEGATIVE
BENZODIAZ UR QL: NEGATIVE
BILIRUB UR QL STRIP: NEGATIVE
CANNABINOIDS UR QL SCN: POSITIVE
COCAINE UR QL: NEGATIVE
COLOR UR AUTO: YELLOW
ETHANOL UR QL SCN: NEGATIVE
GLUCOSE UR STRIP-MCNC: NEGATIVE MG/DL
HCG UR QL: NEGATIVE
HGB UR QL STRIP: NEGATIVE
HYALINE CASTS #/AREA URNS LPF: 1 /LPF (ref 0–2)
KETONES UR STRIP-MCNC: ABNORMAL MG/DL
LABORATORY COMMENT REPORT: NORMAL
LEUKOCYTE ESTERASE UR QL STRIP: NEGATIVE
MUCOUS THREADS #/AREA URNS LPF: PRESENT /LPF
NITRATE UR QL: NEGATIVE
OPIATES UR QL SCN: NEGATIVE
PH UR STRIP: 6.5 PH (ref 5–7)
RBC #/AREA URNS AUTO: 0 /HPF (ref 0–2)
SARS-COV-2 RNA RESP QL NAA+PROBE: NEGATIVE
SOURCE: ABNORMAL
SP GR UR STRIP: 1.01 (ref 1–1.03)
SPECIMEN SOURCE: NORMAL
SQUAMOUS #/AREA URNS AUTO: 1 /HPF (ref 0–1)
UROBILINOGEN UR STRIP-MCNC: NORMAL MG/DL (ref 0–2)
WBC #/AREA URNS AUTO: <1 /HPF (ref 0–5)

## 2021-04-26 PROCEDURE — C9803 HOPD COVID-19 SPEC COLLECT: HCPCS

## 2021-04-26 PROCEDURE — 99285 EMERGENCY DEPT VISIT HI MDM: CPT | Mod: 25

## 2021-04-26 PROCEDURE — 81025 URINE PREGNANCY TEST: CPT | Performed by: EMERGENCY MEDICINE

## 2021-04-26 PROCEDURE — 250N000013 HC RX MED GY IP 250 OP 250 PS 637: Performed by: PSYCHIATRY & NEUROLOGY

## 2021-04-26 PROCEDURE — 81001 URINALYSIS AUTO W/SCOPE: CPT | Performed by: EMERGENCY MEDICINE

## 2021-04-26 PROCEDURE — 90853 GROUP PSYCHOTHERAPY: CPT | Mod: 95 | Performed by: PSYCHOLOGIST

## 2021-04-26 PROCEDURE — 90791 PSYCH DIAGNOSTIC EVALUATION: CPT

## 2021-04-26 PROCEDURE — 124N000002 HC R&B MH UMMC

## 2021-04-26 PROCEDURE — 99285 EMERGENCY DEPT VISIT HI MDM: CPT | Performed by: PSYCHIATRY & NEUROLOGY

## 2021-04-26 PROCEDURE — 80307 DRUG TEST PRSMV CHEM ANLYZR: CPT | Performed by: PSYCHIATRY & NEUROLOGY

## 2021-04-26 PROCEDURE — 87635 SARS-COV-2 COVID-19 AMP PRB: CPT | Performed by: PSYCHIATRY & NEUROLOGY

## 2021-04-26 PROCEDURE — 90853 GROUP PSYCHOTHERAPY: CPT | Mod: GT | Performed by: PSYCHOLOGIST

## 2021-04-26 PROCEDURE — 80320 DRUG SCREEN QUANTALCOHOLS: CPT | Performed by: PSYCHIATRY & NEUROLOGY

## 2021-04-26 PROCEDURE — 90853 GROUP PSYCHOTHERAPY: CPT | Mod: GT

## 2021-04-26 RX ORDER — MAGNESIUM HYDROXIDE/ALUMINUM HYDROXICE/SIMETHICONE 120; 1200; 1200 MG/30ML; MG/30ML; MG/30ML
30 SUSPENSION ORAL EVERY 4 HOURS PRN
Status: DISCONTINUED | OUTPATIENT
Start: 2021-04-26 | End: 2021-05-05 | Stop reason: HOSPADM

## 2021-04-26 RX ORDER — PROPRANOLOL HYDROCHLORIDE 40 MG/1
40 TABLET ORAL 2 TIMES DAILY
Status: DISCONTINUED | OUTPATIENT
Start: 2021-04-26 | End: 2021-04-28

## 2021-04-26 RX ORDER — CYCLOBENZAPRINE HCL 5 MG
5 TABLET ORAL 3 TIMES DAILY PRN
Status: DISCONTINUED | OUTPATIENT
Start: 2021-04-26 | End: 2021-04-27

## 2021-04-26 RX ORDER — TRAZODONE HYDROCHLORIDE 50 MG/1
50 TABLET, FILM COATED ORAL
Status: DISCONTINUED | OUTPATIENT
Start: 2021-04-26 | End: 2021-05-05 | Stop reason: HOSPADM

## 2021-04-26 RX ORDER — MULTIPLE VITAMINS W/ MINERALS TAB 9MG-400MCG
1 TAB ORAL DAILY
Status: DISCONTINUED | OUTPATIENT
Start: 2021-04-27 | End: 2021-05-05 | Stop reason: HOSPADM

## 2021-04-26 RX ORDER — POLYETHYLENE GLYCOL 3350 17 G/17G
17 POWDER, FOR SOLUTION ORAL DAILY
Status: DISCONTINUED | OUTPATIENT
Start: 2021-04-27 | End: 2021-05-05 | Stop reason: HOSPADM

## 2021-04-26 RX ORDER — HYDROXYZINE HYDROCHLORIDE 25 MG/1
25 TABLET, FILM COATED ORAL EVERY 4 HOURS PRN
Status: DISCONTINUED | OUTPATIENT
Start: 2021-04-26 | End: 2021-04-28

## 2021-04-26 RX ORDER — TRAZODONE HYDROCHLORIDE 150 MG/1
150 TABLET ORAL AT BEDTIME
Status: DISCONTINUED | OUTPATIENT
Start: 2021-04-26 | End: 2021-05-05 | Stop reason: HOSPADM

## 2021-04-26 RX ORDER — HYDROXYZINE HYDROCHLORIDE 50 MG/1
100 TABLET, FILM COATED ORAL 2 TIMES DAILY
Status: DISCONTINUED | OUTPATIENT
Start: 2021-04-26 | End: 2021-04-27

## 2021-04-26 RX ORDER — OLANZAPINE 10 MG/1
10 TABLET ORAL 3 TIMES DAILY PRN
Status: DISCONTINUED | OUTPATIENT
Start: 2021-04-26 | End: 2021-05-05 | Stop reason: HOSPADM

## 2021-04-26 RX ORDER — DIVALPROEX SODIUM 500 MG/1
500 TABLET, EXTENDED RELEASE ORAL EVERY MORNING
Status: DISCONTINUED | OUTPATIENT
Start: 2021-04-27 | End: 2021-04-27

## 2021-04-26 RX ORDER — ACETAMINOPHEN 325 MG/1
650 TABLET ORAL EVERY 4 HOURS PRN
Status: DISCONTINUED | OUTPATIENT
Start: 2021-04-26 | End: 2021-05-05 | Stop reason: HOSPADM

## 2021-04-26 RX ORDER — GABAPENTIN 800 MG/1
800 TABLET ORAL 3 TIMES DAILY
Status: DISCONTINUED | OUTPATIENT
Start: 2021-04-26 | End: 2021-04-26

## 2021-04-26 RX ORDER — POLYETHYLENE GLYCOL 3350 17 G/17G
17 POWDER, FOR SOLUTION ORAL DAILY PRN
Status: DISCONTINUED | OUTPATIENT
Start: 2021-04-26 | End: 2021-05-05 | Stop reason: HOSPADM

## 2021-04-26 RX ORDER — EPINEPHRINE 0.3 MG/.3ML
0.3 INJECTION SUBCUTANEOUS PRN
COMMUNITY

## 2021-04-26 RX ORDER — OLANZAPINE 10 MG/2ML
10 INJECTION, POWDER, FOR SOLUTION INTRAMUSCULAR 3 TIMES DAILY PRN
Status: DISCONTINUED | OUTPATIENT
Start: 2021-04-26 | End: 2021-05-05 | Stop reason: HOSPADM

## 2021-04-26 RX ORDER — ESCITALOPRAM OXALATE 10 MG/1
10 TABLET ORAL DAILY
Status: DISCONTINUED | OUTPATIENT
Start: 2021-04-27 | End: 2021-04-27

## 2021-04-26 RX ORDER — DIVALPROEX SODIUM 500 MG/1
1000 TABLET, EXTENDED RELEASE ORAL AT BEDTIME
Status: DISCONTINUED | OUTPATIENT
Start: 2021-04-26 | End: 2021-04-27

## 2021-04-26 RX ORDER — DIVALPROEX SODIUM 500 MG/1
500 TABLET, DELAYED RELEASE ORAL EVERY MORNING
Status: ON HOLD | COMMUNITY
End: 2021-05-04

## 2021-04-26 RX ORDER — LANOLIN ALCOHOL/MO/W.PET/CERES
3 CREAM (GRAM) TOPICAL AT BEDTIME
Status: DISCONTINUED | OUTPATIENT
Start: 2021-04-26 | End: 2021-05-05 | Stop reason: HOSPADM

## 2021-04-26 RX ADMIN — GABAPENTIN 800 MG: 100 CAPSULE ORAL at 21:54

## 2021-04-26 RX ADMIN — DIVALPROEX SODIUM 1000 MG: 500 TABLET, EXTENDED RELEASE ORAL at 21:57

## 2021-04-26 RX ADMIN — MELATONIN TAB 3 MG 3 MG: 3 TAB at 21:57

## 2021-04-26 RX ADMIN — HYDROXYZINE HYDROCHLORIDE 100 MG: 50 TABLET, FILM COATED ORAL at 21:54

## 2021-04-26 RX ADMIN — PROPRANOLOL HYDROCHLORIDE 40 MG: 40 TABLET ORAL at 22:00

## 2021-04-26 RX ADMIN — TRAZODONE HYDROCHLORIDE 150 MG: 150 TABLET ORAL at 21:57

## 2021-04-26 ASSESSMENT — MIFFLIN-ST. JEOR: SCORE: 1491.5

## 2021-04-26 ASSESSMENT — ACTIVITIES OF DAILY LIVING (ADL)
WALKING_OR_CLIMBING_STAIRS_DIFFICULTY: NO
WEAR_GLASSES_OR_BLIND: NO
CONCENTRATING,_REMEMBERING_OR_MAKING_DECISIONS_DIFFICULTY: NO
HEARING_DIFFICULTY_OR_DEAF: NO
HYGIENE/GROOMING: INDEPENDENT
DIFFICULTY_COMMUNICATING: NO
DRESS: INDEPENDENT
FALL_HISTORY_WITHIN_LAST_SIX_MONTHS: NO
DOING_ERRANDS_INDEPENDENTLY_DIFFICULTY: NO
LAUNDRY: WITH SUPERVISION
ORAL_HYGIENE: INDEPENDENT
TOILETING_ISSUES: NO
DIFFICULTY_EATING/SWALLOWING: NO
DRESSING/BATHING_DIFFICULTY: NO

## 2021-04-26 ASSESSMENT — ENCOUNTER SYMPTOMS
RESPIRATORY NEGATIVE: 1
CARDIOVASCULAR NEGATIVE: 1
CONSTITUTIONAL NEGATIVE: 1
NERVOUS/ANXIOUS: 1
NEUROLOGICAL NEGATIVE: 1
HALLUCINATIONS: 0
EYES NEGATIVE: 1
GASTROINTESTINAL NEGATIVE: 1
DECREASED CONCENTRATION: 1
HYPERACTIVE: 0
MUSCULOSKELETAL NEGATIVE: 1
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
## 2021-04-26 NOTE — TELEPHONE ENCOUNTER
S: 4:31p; Stefania called with clinical on a 35y/F present in the [facility] ER w/ SI w/ a plan.     B: The pt was brought in by their mother for SI w/ a plan. The pt told the  that they were driving today and wanted drive the car off the road, the pt admitted to having increased SI over the last 4 days.     The pt is currently in the Adult [facility].     The pt has been hospitalized before: Jan. 2021 at [facility].    The pt has a psychiatrist through [facility] in [place]- Dr. Clark.     The pt is Rx Depakote, Lexapro, Gabapentin and Hydroxyzine. Per the  the pt is compliant.     The pt admits to using marijuana, the pt denies all other substance use.     No concern for HI or aggression.     The pt ambulates independently.     Covid needs to be ordered.   Utox collected.   Pregnancy test is negative.     A: Vol    R: The pt is currently in the [facility] ER awaiting placement    4:37p Intake called the [facility] to request Covid test be ordered. RN will get the order placed and lab collected.    Intake identifying appropriate placement options, the pt has been added to the work-list.     4:51p Intake paged provider to present for [facility].     4:54p Provider returned intakes page- the provider accepted.     4:58p Intake called Unit [facility] to go over admission w/ Charge RN. Report can be called once Covid lab is finalized.     5:02p Intake called [facility] to let the RN know bed placement would be given once Covid results are finalized.

## 2021-04-26 NOTE — GROUP NOTE
Psychotherapy Group Note  Telemedicine Visit: The patient's condition can be safely assessed and treated via synchronous audio and visual telemedicine encounter.    Reason for Telemedicine Visit: Patient has requested telehealth visit  Originating Site (Patient Location): Patient's home  Distant Site (Provider Location): [facility] Outpatient Setting: Adult Day Tx  Consent:  The patient/guardian has verbally consented to: the potential risks and benefits of telemedicine (video visit) versus in person care; bill my insurance or make self-payment for services provided; and responsibility for payment of non-covered services.   Mode of Communication:  Video Conference via [employer]  As the provider I attest to compliance with applicable laws and regulations related to telemedicine.    PATIENT'S NAME: Mariusz Huntley  MRN:   [id number]  :   1985  ACCT. NUMBER: [id number]  DATE OF SERVICE: 21  START TIME: 10:00 AM  END TIME: 10:50 AM  FACILITATOR: Milagros Meza PsyD  TOPIC:  EBP Group: Emotions Management  [facility] Adult Mental Health Day Treatment  TRACK: 2A    NUMBER OF PARTICIPANTS: 7    Summary of Group / Topics Discussed:  Emotions Management: Guilt and Shame: Patients explored and shared personal experiences associated with feelings of guilt and shame.  Group explored how these feelings develop, what they mean to each individual, and how to increase acceptance and usefulness of these feelings.  Group members assisted one another to contextualize these concepts and promote healing.     Patient Session Goals / Objectives:    Discuss and review definitions and personal views/experiences with guilt and shame    Understand the differences between guilt and shame    Explore how feelings of guilt and shame impact functioning    Understand and practice strategies to manage difficult emotions and move towards healing    Understand and normalize difficult emotions through group  discussion    Understand the utility of guilt and shame    Target  unwanted  emotions for change      Patient Participation / Response:  Fully participated with the group by sharing personal reflections / insights and openly received / provided feedback with other participants.    Self-aware of experiences with difficult emotions, and strategies to employ to manage them    Treatment Plan:  Patient has a current master individualized treatment plan.  See Epic treatment plan for more information.    Analia Sousa Psy., D,  Licensed Clinical Psychologist

## 2021-04-26 NOTE — GROUP NOTE
Psychotherapy Group Note    PATIENT'S NAME: Mariusz Huntley  MRN:   [id number]  :   1985  ACCT. NUMBER: [id number]  DATE OF SERVICE: 21  START TIME: 11:00 AM  END TIME: 11:50 AM  FACILITATOR: Natalie Lugo LICSW  TOPIC: MH EBP Group: Behavioral Activation  [facility] Adult Mental Health Day Treatment  TRACK: 2A    Telemedicine Visit: The patient's condition can be safely assessed and treated via synchronous audio and visual telemedicine encounter.      Reason for Telemedicine Visit: Services only offered telehealth    Originating Site (Patient Location): Patient's home    Distant Site (Provider Location): [facility]    Consent:  The patient/guardian has verbally consented to: the potential risks and benefits of telemedicine (video visit) versus in person care; bill my insurance or make self-payment for services provided; and responsibility for payment of non-covered services.     Mode of Communication:  Video Conference via zoom    As the provider I attest to compliance with applicable laws and regulations related to telemedicine.      NUMBER OF PARTICIPANTS: 7    Summary of Group / Topics Discussed:  Behavioral Activation: The Change Process - Behavior Change: Patients explored the process and types of change, including but not limited to, theories of change, steps to making change, methods of changing behavior, and potential barriers.  Patients worked to identify what changes may benefit their daily lives, and work towards a plan to implement change.      Patient Session Goals / Objectives:    Demonstrate understanding of the change process.      Identify positive and negative behavioral patterns.    Make plans to track and implement changes and share experiences in group.    Identify personal barriers to change      Patient Participation / Response:  Minimally participated, only when prompted / asked.    Demonstrated understanding of topics discussed  through group discussion and participation    Treatment Plan:  Patient has a current master individualized treatment plan.  See Epic treatment plan for more information.    ESME AlbertSW

## 2021-04-26 NOTE — GROUP NOTE
"Process Group Note  Telemedicine Visit: The patient's condition can be safely assessed and treated via synchronous audio and visual telemedicine encounter.    Reason for Telemedicine Visit: Patient has requested telehealth visit  Originating Site (Patient Location): Patient's home  Distant Site (Provider Location): [facility] Outpatient Setting: Adult Day Tx  Consent:  The patient/guardian has verbally consented to: the potential risks and benefits of telemedicine (video visit) versus in person care; bill my insurance or make self-payment for services provided; and responsibility for payment of non-covered services.   Mode of Communication:  Video Conference via [employer]  As the provider I attest to compliance with applicable laws and regulations related to telemedicine.    PATIENT'S NAME: Mariusz Huntley  MRN:   [id number]  :   1985  ACCT. NUMBER: [id number]  DATE OF SERVICE: 21  START TIME:  9:00 AM  END TIME:  9:50 AM  FACILITATOR: Milagros Meza PsyD  TOPIC:  Process Group    Diagnoses:  296.32 Major Depressive Disorder, Moderate  309.81 PTSD  R/O Panic Disorder    Psychiatry: [facility] [place]  Therapy: Dr. Casper, [facility]  PCP:  ......      [facility] Adult Mental Health Day Treatment  TRACK: 2A    NUMBER OF PARTICIPANTS: 7          Data:    Session content: At the start of this group, patients were invited to check in by identifying themselves, describing their current emotional status, and identifying issues to address in this group.   Area(s) of treatment focus addressed in this session included Symptom Management, Personal Safety and Community Resources/Discharge Planning.  Client reported being safe today.  Reported mood is depressed.    Goal for today is to attend therapy then get a COVID19 shot.  The client talked to the group about how she has been in a \"dark place\" for a while and that after her shot, she will go to the ED to " talk to them about her medications and her level of depression. She reported problems with nightmares about death and feelings of depression and suicidal ideation during the day. She reported that she has an appointment with her psychiatrist and that her Depakote was increased. She reported that she will tell her  about her plans to go to the ED.       Therapeutic Interventions/Treatment Strategies:  Psychotherapist reinforced use of skills. Treatment modalities used include Cognitive Behavioral Therapy and Dialectical Behavioral Therapy. Interventions include Relapse Prevention: Coached on skills to manage factors that contribute to relapse, Mindfulness: Facilitated discussion of when/how to use mindfulness skills to benefit general health, mental health symptoms, and stressors and Symptoms Management: Promoted understanding of their diagnoses and how it impacts their functioning.    Assessment:    Patient response:   Patient responded to session by giving feedback, focusing on goals and appearing alert    Possible barriers to participation / learning include: severity of symptoms    Health Issues:   None reported       Substance Use Review:   Substance Use: No active concerns identified.    Mental Status/Behavioral Observations  Appearance:   Appropriate   Eye Contact:   Good   Psychomotor Behavior: Normal   Attitude:   Cooperative   Orientation:   All  Speech   Rate / Production: Normal    Volume:  Normal   Mood:    depressed  Affect:    Appropriate   Thought Content:   Clear  Thought Form:  Coherent  Logical     Insight:    Good     Plan:     Safety Plan: Recommended that patient call 911 or go to the local ED should there be a change in any of these risk factors.     Barriers to treatment: None identified    Patient Contracts (see media tab):  None    Substance Use: Provided encouragement towards sobriety     Continue or Discharge: Patient will continue in Adult Day Treatment (ADT)  as planned. Patient  is likely to benefit from learning and using skills as they work toward the goals identified in their treatment plan.      Milagros Meza PsyD  April 26, 2021  Selene Head., FEDE,  Licensed Clinical Psychologist

## 2021-04-27 ENCOUNTER — TELEPHONE (OUTPATIENT)
Dept: BEHAVIORAL HEALTH | Facility: CLINIC | Age: 36
End: 2021-04-27

## 2021-04-27 LAB
ALBUMIN SERPL-MCNC: 3.2 G/DL (ref 3.4–5)
ALP SERPL-CCNC: 35 U/L (ref 40–150)
ALT SERPL W P-5'-P-CCNC: 20 U/L (ref 0–50)
ANION GAP SERPL CALCULATED.3IONS-SCNC: 5 MMOL/L (ref 3–14)
AST SERPL W P-5'-P-CCNC: 11 U/L (ref 0–45)
BILIRUB SERPL-MCNC: 0.6 MG/DL (ref 0.2–1.3)
BUN SERPL-MCNC: 11 MG/DL (ref 7–30)
CALCIUM SERPL-MCNC: 8.2 MG/DL (ref 8.5–10.1)
CHLORIDE SERPL-SCNC: 107 MMOL/L (ref 94–109)
CHOLEST SERPL-MCNC: 111 MG/DL
CO2 SERPL-SCNC: 26 MMOL/L (ref 20–32)
CREAT SERPL-MCNC: 0.78 MG/DL (ref 0.52–1.04)
ERYTHROCYTE [DISTWIDTH] IN BLOOD BY AUTOMATED COUNT: 12.8 % (ref 10–15)
GFR SERPL CREATININE-BSD FRML MDRD: >90 ML/MIN/{1.73_M2}
GLUCOSE SERPL-MCNC: 97 MG/DL (ref 70–99)
HCT VFR BLD AUTO: 40 % (ref 35–47)
HDLC SERPL-MCNC: 54 MG/DL
HGB BLD-MCNC: 13.4 G/DL (ref 11.7–15.7)
LDLC SERPL CALC-MCNC: 40 MG/DL
MCH RBC QN AUTO: 30.9 PG (ref 26.5–33)
MCHC RBC AUTO-ENTMCNC: 33.5 G/DL (ref 31.5–36.5)
MCV RBC AUTO: 92 FL (ref 78–100)
NONHDLC SERPL-MCNC: 57 MG/DL
PLATELET # BLD AUTO: 141 10E9/L (ref 150–450)
POTASSIUM SERPL-SCNC: 4.4 MMOL/L (ref 3.4–5.3)
PROT SERPL-MCNC: 6.7 G/DL (ref 6.8–8.8)
RBC # BLD AUTO: 4.34 10E12/L (ref 3.8–5.2)
SODIUM SERPL-SCNC: 138 MMOL/L (ref 133–144)
TRIGL SERPL-MCNC: 85 MG/DL
TSH SERPL DL<=0.005 MIU/L-ACNC: 1.78 MU/L (ref 0.4–4)
WBC # BLD AUTO: 8.1 10E9/L (ref 4–11)

## 2021-04-27 PROCEDURE — 124N000002 HC R&B MH UMMC

## 2021-04-27 PROCEDURE — 85027 COMPLETE CBC AUTOMATED: CPT | Performed by: PSYCHIATRY & NEUROLOGY

## 2021-04-27 PROCEDURE — 99222 1ST HOSP IP/OBS MODERATE 55: CPT | Mod: AI | Performed by: PSYCHIATRY & NEUROLOGY

## 2021-04-27 PROCEDURE — 80053 COMPREHEN METABOLIC PANEL: CPT | Performed by: PSYCHIATRY & NEUROLOGY

## 2021-04-27 PROCEDURE — 84443 ASSAY THYROID STIM HORMONE: CPT | Performed by: PSYCHIATRY & NEUROLOGY

## 2021-04-27 PROCEDURE — 36415 COLL VENOUS BLD VENIPUNCTURE: CPT | Performed by: PSYCHIATRY & NEUROLOGY

## 2021-04-27 PROCEDURE — 250N000013 HC RX MED GY IP 250 OP 250 PS 637: Performed by: PSYCHIATRY & NEUROLOGY

## 2021-04-27 PROCEDURE — 80061 LIPID PANEL: CPT | Performed by: PSYCHIATRY & NEUROLOGY

## 2021-04-27 RX ORDER — DIVALPROEX SODIUM 500 MG/1
500 TABLET, EXTENDED RELEASE ORAL AT BEDTIME
Status: DISCONTINUED | OUTPATIENT
Start: 2021-04-27 | End: 2021-04-30

## 2021-04-27 RX ORDER — ESCITALOPRAM OXALATE 10 MG/1
20 TABLET ORAL DAILY
Status: DISCONTINUED | OUTPATIENT
Start: 2021-04-28 | End: 2021-05-05 | Stop reason: HOSPADM

## 2021-04-27 RX ORDER — AMOXICILLIN 250 MG
1 CAPSULE ORAL 2 TIMES DAILY PRN
Status: DISCONTINUED | OUTPATIENT
Start: 2021-04-27 | End: 2021-05-05 | Stop reason: HOSPADM

## 2021-04-27 RX ORDER — ACETAMINOPHEN 325 MG/1
650 TABLET ORAL EVERY 4 HOURS PRN
Status: DISCONTINUED | OUTPATIENT
Start: 2021-04-27 | End: 2021-04-27

## 2021-04-27 RX ORDER — VITAMIN B COMPLEX
50 TABLET ORAL DAILY
Status: DISCONTINUED | OUTPATIENT
Start: 2021-04-27 | End: 2021-05-05 | Stop reason: HOSPADM

## 2021-04-27 RX ORDER — CYCLOBENZAPRINE HCL 10 MG
10 TABLET ORAL 3 TIMES DAILY PRN
Status: DISCONTINUED | OUTPATIENT
Start: 2021-04-27 | End: 2021-05-05 | Stop reason: HOSPADM

## 2021-04-27 RX ADMIN — GABAPENTIN 800 MG: 100 CAPSULE ORAL at 13:14

## 2021-04-27 RX ADMIN — PROPRANOLOL HYDROCHLORIDE 40 MG: 40 TABLET ORAL at 21:45

## 2021-04-27 RX ADMIN — Medication 50 MCG: at 13:14

## 2021-04-27 RX ADMIN — TRAZODONE HYDROCHLORIDE 150 MG: 150 TABLET ORAL at 21:45

## 2021-04-27 RX ADMIN — ACETAMINOPHEN 650 MG: 325 TABLET, FILM COATED ORAL at 13:14

## 2021-04-27 RX ADMIN — ACETAMINOPHEN 650 MG: 325 TABLET, FILM COATED ORAL at 05:28

## 2021-04-27 RX ADMIN — HYDROXYZINE HYDROCHLORIDE 100 MG: 50 TABLET, FILM COATED ORAL at 08:30

## 2021-04-27 RX ADMIN — POLYETHYLENE GLYCOL 3350 17 G: 17 POWDER, FOR SOLUTION ORAL at 08:29

## 2021-04-27 RX ADMIN — DIVALPROEX SODIUM 500 MG: 500 TABLET, EXTENDED RELEASE ORAL at 21:44

## 2021-04-27 RX ADMIN — ESCITALOPRAM OXALATE 10 MG: 10 TABLET ORAL at 08:30

## 2021-04-27 RX ADMIN — ACETAMINOPHEN 650 MG: 325 TABLET, FILM COATED ORAL at 18:07

## 2021-04-27 RX ADMIN — GABAPENTIN 800 MG: 100 CAPSULE ORAL at 08:30

## 2021-04-27 RX ADMIN — MELATONIN TAB 3 MG 3 MG: 3 TAB at 21:44

## 2021-04-27 RX ADMIN — PROPRANOLOL HYDROCHLORIDE 40 MG: 40 TABLET ORAL at 08:30

## 2021-04-27 RX ADMIN — DIVALPROEX SODIUM 500 MG: 500 TABLET, EXTENDED RELEASE ORAL at 08:30

## 2021-04-27 RX ADMIN — MULTIPLE VITAMINS W/ MINERALS TAB 1 TABLET: TAB at 08:30

## 2021-04-27 RX ADMIN — GABAPENTIN 800 MG: 100 CAPSULE ORAL at 21:45

## 2021-04-27 ASSESSMENT — ACTIVITIES OF DAILY LIVING (ADL)
ORAL_HYGIENE: INDEPENDENT
HYGIENE/GROOMING: INDEPENDENT
HYGIENE/GROOMING: INDEPENDENT
LAUNDRY: WITH SUPERVISION
DRESS: INDEPENDENT
ORAL_HYGIENE: INDEPENDENT
DRESS: INDEPENDENT

## 2021-04-27 ASSESSMENT — MIFFLIN-ST. JEOR: SCORE: 1472.45

## 2021-04-27 NOTE — PLAN OF CARE
Assessment/Intervention/Current Symtoms and Care Coordination  YOUNG (writer) met with trx team, provided update, and reviewed pt's chart. CTC completed initial team note. CTC met with pt to introduce themselves, and completed IPA interview. CTC addressed questions and concerns pt had, mostly about MICHAEL's and transitioning back to day treatment upon discharge. Pt presented as flat during the IPA interview, and endorsed current SI. Pt appeared to be a reliable historian/ during interview. Pt confirmed wanting to return to day trx upon discharge and that she had informed them about going IP prior to admit. CTC agreed to follow-up with day trx about pt's case and discuss discharge planning with them prior to pt leaving.      Discharge Plan or Goal  Pt will likely discharge back to day treatment once stabilized.     Barriers to Discharge   Safe discharge plan, medication evaluation, and symptom presentation (ongoing depression and SI)     Referral Status  Pt will likely return to day treatment once stable, so no referrals needed at this time.     Legal Status  VOLUNTARY

## 2021-04-27 NOTE — PLAN OF CARE
NOC Shift Report    Pt in bed at beginning of shift, breathing quiet and unlabored. Pt slept through shift. Pt slept 6 hours.     No pt complaints or concerns at this time.     No PRNs given. Will continue to monitor.     Precautions: Suicide

## 2021-04-27 NOTE — PROGRESS NOTES
04/26/21 2023   Patient Belongings   Did you bring any home meds/supplements to the hospital?  No   Patient Belongings remains with patient;locker   Patient Belongings Remaining with Patient bracelet   Patient Belongings Put in Hospital Secure Location (Security or Locker, etc.) cell phone/electronics;clothing;shoes   Belongings Search Yes   Clothing Search Yes   Second Staff Cherie CELAYA RN       With the patient:  2 bracelets  2 journals    Locker: Sweatshirt  Pair of socks  T-shirt  Sports bra  Sweatpants with strings  Mask  Pair of crocs  Cellphone [place]          A               Admission:  I am responsible for any personal items that are not sent to the safe or pharmacy.  [facility] is not responsible for loss, theft or damage of any property in my possession.    Signature:  _________________________________ Date: _______  Time: _____                                              Staff Signature:  ____________________________ Date: ________  Time: _____      2nd Staff person, if patient is unable/unwilling to sign:    Signature: ________________________________ Date: ________  Time: _____     Discharge:  [facility] has returned all of my personal belongings:    Signature: _________________________________ Date: ________  Time: _____                                          Staff Signature:  ____________________________ Date: ________  Time: _____

## 2021-04-27 NOTE — PLAN OF CARE
"Pt has been withdrawn and isolative to her room. Pt denies any goal and did not attend group.  Pt is unsure when is going to start attending group, she states she went to some on her last admission. Pt will return to moms place at discharge. Rates depression and anxiety both 6-7/10 with 10 being high. Pt was given a lavender patch and stated she was going to take a nap to help reduce this. Poor concentration. States she was able to read 5 pages of book before stopping. Racing thoughts earlier. Pt has SI thoughts with no plan and contracts for safety. States her thoughts are worse when she is in a car or alone. Pt received her 1st covid shot yesterday. (moderna). She has a sore arm from it. States her appetite is fine. Pt ate breakfast and lunch. States her sleep is \"horrible\" and that she has nightmares. States her 35 female friend  recently and she leaves behind 2 small girls. Pt requested tylenol for headache.    "

## 2021-04-27 NOTE — TELEPHONE ENCOUNTER
Phone call and a message was left for [facility] to check-in with staff, since she was not in group and was at the ED yesterday.    Sheryl Head, D,  Licensed Clinical Psychologist
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
## 2021-04-27 NOTE — PHARMACY-ADMISSION MEDICATION HISTORY
Admission Medication History Completed by Pharmacy    See [facility] Admission Navigator for allergy information, preferred outpatient pharmacy, prior to admission medications and immunization status.     Medication History Sources:   Patient  Surescripts    Changes made to PTA medication list (reason):  Added:   EPINEPHrine (ANY BX GENERIC EQUIV) 0.3 MG/0.3ML injection 2-pack: Inject 0.3 mg into the muscle as needed for anaphylaxis  Deleted:   Acetaminophen 325 mg tablet: take 1 tablets by mouth every 4 hours as needed for mild pain.  Albuterol 108 mcg/act inhaler: inhale 2 puffs into the lungs every 4 hours as needed for shortness of breath/dyspnea or wheezing.  Gabapentin 600 mg tablet: take 1 tablet by mouth 4 times daily.  Ibuprofen 600 mg tablet: take 1 tablet by mouth every 6 hours as needed for moderate pain.  Lorazepam 2 mg tablet: take 2 mg by mouth every 6 hours as needed for anxity reports 10 mg???  Methyl salicylate-menthol (Alin-Rossi) ointment: apply 1 g topically every 4 hours as needed (pain, apply to back)  Ondansetron 4 mg ODT tab: take 4 mg by mouth every 6 hours as needed for nausea or vomiting.  Trazodone 100 mg tablet: take 100 mg by mouth at bedtime.  Vitamin D3 25 mcg tablet: take 2 tablets by mouth daily.     Changed:   Gabapentin 600 mg tablet: take 800 mg by mouth 3 times daily --> gabapentin 800 mg tablet: take 800 mg by mouth 3 times daily.  Hydroxyzine 50 mg tablet: take 2 tablets by mouth every 4 hours as needed for anxiety. --> take 100 mg by mouth two times daily.  Trazodone 100 mg tablet: take 100 mg by mouth at bedtime. --> trazodone 150 mg tablet: take 150 mg by mouth at bedtime.     Additional Information:  Patient is a great historian; she knew her medications by name, strength, directions, and when they were last taken.  Patient has not taken flexeril since march but states she should be taking it.  Patient reported taking 500 mg divalproex AM and 1000 mg PM starting last  week.      Prior to Admission medications    Medication Sig Last Dose Taking? Auth Provider   divalproex sodium delayed-release (DEPAKOTE) 500 MG DR tablet Take 500 mg by mouth every morning 4/26/2021 at AM Yes Unknown, Entered By History   divalproex sodium extended-release (DEPAKOTE ER) 250 MG 24 hr tablet Take 1 po at bedtime x 2 days then 2 po at bedtime x 2 days then 3 po at bedtime x 2 days then 4 po qHS 4/25/2021 at HS Yes Archie Villa MD   escitalopram (LEXAPRO) 10 MG tablet Take 1 tablet (10 mg) by mouth daily 4/26/2021 at AM Yes Lora Buenrostro APRN CNP   gabapentin (NEURONTIN) 800 MG tablet Take 800 mg by mouth 3 times daily  4/26/2021 at AM Yes Reported, Patient   melatonin 3 MG tablet Take 1-2 tablets (3-6 mg) by mouth nightly as needed for sleep 4/25/2021 at HS Yes Lora Buenrostro APRN CNP   Multiple Vitamins-Minerals (MULTIVITAMIN GUMMIES ADULT PO) Take 4 tablets by mouth daily 4/26/2021 at AM Yes Reported, Patient   polyethylene glycol (MIRALAX) 17 GM/Dose powder Take 17 g by mouth daily Past Month Yes Lora Buenrostro APRN CNP   propranolol (INDERAL) 40 MG tablet Take 1 tablet (40 mg) by mouth 2 times daily 4/26/2021 at AM Yes Lora Buenrostro APRN CNP   traZODone (DESYREL) 150 MG tablet Take 150 mg by mouth At Bedtime  4/25/2021 at HS Yes Reported, Patient   cyclobenzaprine (FLEXERIL) 5 MG tablet Take 1 tablet (5 mg) by mouth 3 times daily as needed for muscle spasms More than a month  Lora Buenrostro APRN CNP   EPINEPHrine (ANY BX GENERIC EQUIV) 0.3 MG/0.3ML injection 2-pack Inject 0.3 mg into the muscle as needed for anaphylaxis  Yes    hydrOXYzine (ATARAX) 50 MG tablet Take 100 mg by mouth 2 times daily   at AM  Lora Buenrostro APRN CNP       Date completed: 04/26/21    Medication history completed by: MAIK Lomas3 Pharmacy Intern

## 2021-04-27 NOTE — PLAN OF CARE
BEHAVIORAL TEAM DISCUSSION    Participants: Dr. José HOPE, Kae Ceballos RN, Alin Purdy [facility]  Progress: New Admit  Anticipated length of stay: 7 days  Continued Stay Criteria/Rationale: Evaluation and assessment for hospitalization  Medical/Physical: none reported in team  Precautions:   Behavioral Orders   Procedures    Code 1 - Restrict to Unit    Discontinue 1:1 attendant for suicide risk     Order Specific Question:   I have performed an in person assessment of the patient     Answer:   Based on this assessment the patient no longer requires a one on one attendant at this point in time.    Routine Programming     As clinically indicated    Status 15     Every 15 minutes.    Suicide precautions     Patients on Suicide Precautions should have a Combination Diet ordered that includes a Diet selection(s) AND a Behavioral Tray selection for Safe Tray - with utensils, or Safe Tray - NO utensils       Plan: Continue evaluation and assessment for stabilization and aftercare needs.  Rationale for change in precautions or plan: None

## 2021-04-27 NOTE — PLAN OF CARE
Patient is a 35 year old female admitted due to SI with a plan. She is voluntary. Covid test is negative and Utox is positive for cannabinoids.    Patient has a history of anxiety, MDD, PTSD and SI. Patient admits to inpatient treatment on station 32N and states she is at a partial day treatment program with [facility]. Patient denies any other outpatient services or residential treatment.     Patient arrived with a blunted affect. She was calm and cooperative with the admission process. She admits to SI but contracts for safety. She denies SI, SIB, HI or hallucinations. Patient talked with few words but appeared to be a good historian as related to her medications. Vitals are WNL. After the interview she went to her room and she has only come out to get snacks and water.    Admit orders have been placed. BEH admit profile is completed. Patient is on SI precautions
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
## 2021-04-28 PROCEDURE — 124N000002 HC R&B MH UMMC

## 2021-04-28 PROCEDURE — 250N000013 HC RX MED GY IP 250 OP 250 PS 637: Performed by: PSYCHIATRY & NEUROLOGY

## 2021-04-28 PROCEDURE — 99232 SBSQ HOSP IP/OBS MODERATE 35: CPT | Performed by: PSYCHIATRY & NEUROLOGY

## 2021-04-28 RX ORDER — PROPRANOLOL HYDROCHLORIDE 10 MG/1
20 TABLET ORAL 2 TIMES DAILY
Status: DISCONTINUED | OUTPATIENT
Start: 2021-04-28 | End: 2021-05-05 | Stop reason: HOSPADM

## 2021-04-28 RX ORDER — HYDROXYZINE HYDROCHLORIDE 50 MG/1
50 TABLET, FILM COATED ORAL EVERY 4 HOURS PRN
Status: DISCONTINUED | OUTPATIENT
Start: 2021-04-28 | End: 2021-05-05 | Stop reason: HOSPADM

## 2021-04-28 RX ADMIN — ESCITALOPRAM OXALATE 20 MG: 10 TABLET ORAL at 08:48

## 2021-04-28 RX ADMIN — DIVALPROEX SODIUM 500 MG: 500 TABLET, EXTENDED RELEASE ORAL at 21:22

## 2021-04-28 RX ADMIN — MULTIPLE VITAMINS W/ MINERALS TAB 1 TABLET: TAB at 08:47

## 2021-04-28 RX ADMIN — PROPRANOLOL HYDROCHLORIDE 20 MG: 10 TABLET ORAL at 21:22

## 2021-04-28 RX ADMIN — TRAZODONE HYDROCHLORIDE 150 MG: 150 TABLET ORAL at 21:21

## 2021-04-28 RX ADMIN — GABAPENTIN 800 MG: 100 CAPSULE ORAL at 21:21

## 2021-04-28 RX ADMIN — MELATONIN TAB 3 MG 3 MG: 3 TAB at 21:22

## 2021-04-28 RX ADMIN — PROPRANOLOL HYDROCHLORIDE 40 MG: 40 TABLET ORAL at 08:48

## 2021-04-28 RX ADMIN — ACETAMINOPHEN 650 MG: 325 TABLET, FILM COATED ORAL at 08:48

## 2021-04-28 RX ADMIN — Medication 50 MCG: at 08:48

## 2021-04-28 RX ADMIN — GABAPENTIN 800 MG: 100 CAPSULE ORAL at 08:48

## 2021-04-28 RX ADMIN — POLYETHYLENE GLYCOL 3350 17 G: 17 POWDER, FOR SOLUTION ORAL at 08:48

## 2021-04-28 RX ADMIN — GABAPENTIN 800 MG: 100 CAPSULE ORAL at 16:18

## 2021-04-28 ASSESSMENT — ACTIVITIES OF DAILY LIVING (ADL)
HYGIENE/GROOMING: INDEPENDENT
LAUNDRY: WITH SUPERVISION
DRESS: INDEPENDENT
ORAL_HYGIENE: INDEPENDENT
HYGIENE/GROOMING: INDEPENDENT
ORAL_HYGIENE: INDEPENDENT
DRESS: INDEPENDENT

## 2021-04-28 NOTE — PLAN OF CARE
Assessment/Intervention/Current Symtoms and Care Coordination  YOUNG (writer) met with trx team, provided update, and reviewed pt's chart. CTC intended to meet with pt today, but they were asleep during the afternoon when CTC was available. CTC decided not to wake pt as she had recently expressed feeling tired and wanting to get more sleep. CTC observed pt being more active in the milieu this morning. Pt was not reported to have had any questions or concerns for CTC as reported by staff when asked. Furthermore, staff confirmed that pt ate during her meals, and did not express any concerns about pt's eating pattern/amount. Pt was reported have expressed feeling irritable to staff as well today.     Discharge Plan or Goal  Pt will likely discharge back to day treatment once stabilized.     Barriers to Discharge   Safe discharge plan, medication evaluation, and symptom presentation (ongoing depression and SI)     Referral Status  Pt will likely return to day treatment once stable, so no referrals needed at this time.     Legal Status  VOLUNTARY

## 2021-04-28 NOTE — PLAN OF CARE
"Pt was in her room for almost the entire shift. She came out for meals and to get her medications. Pt's affect was blunted/flat and at times irritable. Her mood was depressed and anxious. Pt endorsed chronic SI with no stated plan or intent to act and she contracted for safety. Pt endorsed anxiety and depression she rated depression 6/10 and anxiety 7-8/10 with 10 being the worst. Pt denied SIB and AVH. She reported difficulties concentrating today and endorsed racing thoughts that got worse when she was in the lounge watching TV. Pt reports appetite is \"fairly decent\" she ate about 50% of her dinner. She reports sleeping poorly at night and attributes it to taking more naps during the day. Writer educated her on some sleep hygiene practices like avoiding naps during the day to improve sleep at night and she seemed receptive and accepting to the education. Pt had right shoulder pain that she rated 6/10 with 10 being the worst and received PRN tylenol 650 mg for pain which she later stated helped and brought her pain down to 4/10. Writer also gave her two hot packs for her shoulder and she said that was very helpful in reducing her pain. Pt was medication compliant for scheduled evening medications and reports no side effects. She expressed one concern about not having a PRN for anxiety and would like to talk to the doctor about it getting a PRN made available for her.   "
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
## 2021-04-28 NOTE — PLAN OF CARE
"Mariusz was out of her room before breakfast and up for breakfast. Showered independently.     Affect remains, blunted, flat. She continues to endorse chronic SI, which she stated has been unchanged since admission. She is able to contract for safety. She continues to endorse racing thoughts and difficulty concentrating.     Of note, pt had a significant drop in orthostatic BP. She states the she sees stars every time she stands up, and she stated that this has been like this for about a year and she knows how to deal with it. She attributes this to her high level of gabapentin and propanolol.     Her goal today is to be out of her room more. She knows that she slept too much yesterday during the day and that affected her nighttime sleep. She was encouraged to go to groups on several occassions, but declined. She stated she was \"having a bad day\" , and stated she was feeling anti social and was not up to going to groups today.   "

## 2021-04-28 NOTE — PLAN OF CARE
NOC Shift Report     Pt in bed at beginning of shift, breathing quiet and unlabored. Pt slept through shift. Pt slept 6.5 hours.      No pt complaints or concerns at this time.      No PRNs given. Will continue to monitor.      Precautions: Suicide

## 2021-04-29 PROCEDURE — 124N000002 HC R&B MH UMMC

## 2021-04-29 PROCEDURE — H2032 ACTIVITY THERAPY, PER 15 MIN: HCPCS

## 2021-04-29 PROCEDURE — 250N000013 HC RX MED GY IP 250 OP 250 PS 637: Performed by: PSYCHIATRY & NEUROLOGY

## 2021-04-29 PROCEDURE — G0177 OPPS/PHP; TRAIN & EDUC SERV: HCPCS

## 2021-04-29 PROCEDURE — 99232 SBSQ HOSP IP/OBS MODERATE 35: CPT | Performed by: PSYCHIATRY & NEUROLOGY

## 2021-04-29 RX ORDER — PRAZOSIN HYDROCHLORIDE 1 MG/1
1 CAPSULE ORAL AT BEDTIME
Status: DISCONTINUED | OUTPATIENT
Start: 2021-04-29 | End: 2021-05-05 | Stop reason: HOSPADM

## 2021-04-29 RX ADMIN — GABAPENTIN 800 MG: 100 CAPSULE ORAL at 14:04

## 2021-04-29 RX ADMIN — GABAPENTIN 800 MG: 100 CAPSULE ORAL at 08:34

## 2021-04-29 RX ADMIN — ACETAMINOPHEN 650 MG: 325 TABLET, FILM COATED ORAL at 08:35

## 2021-04-29 RX ADMIN — TRAZODONE HYDROCHLORIDE 150 MG: 150 TABLET ORAL at 21:29

## 2021-04-29 RX ADMIN — ACETAMINOPHEN 650 MG: 325 TABLET, FILM COATED ORAL at 20:41

## 2021-04-29 RX ADMIN — ESCITALOPRAM OXALATE 20 MG: 10 TABLET ORAL at 08:35

## 2021-04-29 RX ADMIN — GABAPENTIN 800 MG: 100 CAPSULE ORAL at 20:40

## 2021-04-29 RX ADMIN — CYCLOBENZAPRINE 10 MG: 10 TABLET, FILM COATED ORAL at 21:29

## 2021-04-29 RX ADMIN — PRAZOSIN HYDROCHLORIDE 1 MG: 1 CAPSULE ORAL at 21:29

## 2021-04-29 RX ADMIN — MELATONIN TAB 3 MG 3 MG: 3 TAB at 21:29

## 2021-04-29 RX ADMIN — Medication 50 MCG: at 08:34

## 2021-04-29 RX ADMIN — PROPRANOLOL HYDROCHLORIDE 20 MG: 10 TABLET ORAL at 08:38

## 2021-04-29 RX ADMIN — PROPRANOLOL HYDROCHLORIDE 20 MG: 10 TABLET ORAL at 20:40

## 2021-04-29 RX ADMIN — POLYETHYLENE GLYCOL 3350 17 G: 17 POWDER, FOR SOLUTION ORAL at 08:34

## 2021-04-29 RX ADMIN — MULTIPLE VITAMINS W/ MINERALS TAB 1 TABLET: TAB at 08:35

## 2021-04-29 RX ADMIN — DIVALPROEX SODIUM 500 MG: 500 TABLET, EXTENDED RELEASE ORAL at 21:29

## 2021-04-29 ASSESSMENT — MIFFLIN-ST. JEOR: SCORE: 1481.07

## 2021-04-29 ASSESSMENT — ACTIVITIES OF DAILY LIVING (ADL)
ORAL_HYGIENE: INDEPENDENT
LAUNDRY: WITH SUPERVISION
HYGIENE/GROOMING: INDEPENDENT
DRESS: INDEPENDENT
ORAL_HYGIENE: INDEPENDENT
DRESS: INDEPENDENT
HYGIENE/GROOMING: INDEPENDENT
LAUNDRY: WITH SUPERVISION

## 2021-04-29 NOTE — PLAN OF CARE
"Nursing Assessment:  Mariusz was up ad moustapha, attended am groups and OT after lunch. Pt ate 75% of both meals. Merit missed OT #2 group, stayed in her room. Writer met with Mariusz to discuss, she reported \"I had a bad phone call, I got very frustrated so I went to lay down in my room\" Pt would not elaborate further on the nature of the call. Mariusz denied having suicidal ideation, acknowledged \"after the phone call I was so mad I thought about slamming my hand in my room door, but I didn't\"     Mariusz said her primary issue is anxiety more so than depression. Mariusz's affect is flat, she has been pleasant on approach, med adherent, cooperative.   "

## 2021-04-29 NOTE — PLAN OF CARE
Problem: OT General Care Plan  Goal: OT Goal 1  Description: Work with pt to increase awareness of how symptoms can impact performance on goal-directed tasks and life management skills. Will provide opportunities to build on coping strategies to manage symptoms during hospitalization and after discharge.    [facility]  Occupational Therapy Behavioral Health Assessment    Patient Name: Mariusz Huntley    Description: OT staff met with pt to review the role of occupational therapy and explain the value of having them involved in their treatment plan including options to meet current needs/self-identified goals. The below evaluation is a compilation of functional performance observation and information obtained from an OT self-assessment.     Clinical impression through direct observation: Pt attended 2 out of 3 OT groups offered. Pt actively participated in occupational therapy clinic with 5 patients total x100 minutes. Pt was able to ask for assistance as needed, and independently initiate a creative expression task. Pt demonstrated good focus, planning, problem solving, and attention to detail. Organized in her task approach. Pt appeared comfortable interacting with peers, and appeared to brighten slightly when talking about her 2 children. She shared that she enjoys yoga, and that it is helpful for managing her anxiety. Calm, pleasant, cooperative, and engaged. Will continue to assess.     04/29/21 1500   General Information   Date Initially Attended OT 04/29/21   Clinical Impression   Affect Restricted;Appropriate to situation   Orientation Oriented to person, place and time   Appearance and ADLs General cleanliness observed in most areas   Attention to Internal Stimuli No observed signs   Interaction Skills Interacts appropriately with staff;Interacts appropriately with peers   Ability to Communicate Needs Does so with prompts   Verbal Content Articulate;Clear;Appropriate to topic   Ability to  Maintain Boundaries Maintains appropriate physical boundaries;Maintains appropriate verbal boundaries   Participation Independently participates   Concentration Concentrates 30+ minutes   Ability to Concentrate Without difficulty;With structure   Follows and Comprehends Directions Independently follows multi-step directions   Memory Delayed and immediate recall intact   Organization Independently organizes all tasks   Decision Making Independent   Planning and Problem Solving Independently plans ahead   Ability to Apply and Learn Concepts Applies within group structure   Frustrations / Stress Tolerance Independently identifies sources of frustration/stress;Independently identifies skills    Level of Insight Identifies needs with structure/support   Self Esteem Takes risks with support and encouragement   Social Supports Has knowledge of support systems       Patient indicated success in: (Bolded items indicate activities the pt selected)   Time spent with family or friends  Relaxing and enjoying myself  Having a satisfying routine  Work or volunteering   Concentrating on my tasks     Living independently  Taking care of the place where I live  Transportation  Managing my finances  Managing my basic needs (food, medicine, sleep)  Learning new things  Ability to pursue my goals  Other:    Patient indicated barriers to success are: (Bolded items indicate activities the pt selected)   Difficulty concentrating  Memory problems  Physical health/Chronic Pain  Lacks support (emotional/physical/spiritual)  Motivation/mood  Finances  Using drugs or alcohol  Sleeping too much or not enough  Missing work/appointments  Bothered by lights or sounds in the environment  Bothered by touch, texture, or movement  Lack of satisfying daily routine   Living environment  Transportation  Other:          Patient indicated these supports: (Bolded items indicate activities the pt selected)   Safe place to live  Family, friends or caregivers  "to help out when needed  A best friend or significant other  Pets  Belief in myself and abilities  Routines and rituals that support my wellness  Access to email, social media, phone calls  Leisure supplies to support my interests and hobbies  Professionals: , Therapist, etc.  Other:      Patient identified these emotional, physical or mental health concerns: \"anxiety, suicidal thoughts, self-harm thoughts\"    Patient rui with these concerns: \"physical activity, music, art projects\"    Patient enjoys spending time with: \"my kids\"    Identified enjoyment in activities: \"rollerblading, art\"    Stressors or changes in the past year: \"a lot\"    Patient identified values: \"spirituality\"    Patient's goal for the future is [left blank]    Goals selected by patient to work on with OT: (Bolded items indicate activities the pt selected)   Have hope for the future  Feel better  Learn ways to stay well and avoid coming to the hospital  Share my thoughts and feelings  Feel more confident  Improve my sleep  Improve my concentration  Relax and enjoy myself  Improve my relationships with others  Handle my frustrations  Develop a satisfying daily routine  Manage my physical pain  Explore use of sensory coping strategies  Other:     Assessment: Patient would benefit from continue engagement in OT groups that support healthy recovery, specifically exploration of positive coping skills for symptom management/relapse prevention.    Plan: Initiate care plan goals and interventions.    Patient participated in goal(s) selection and understands the plan of care: Yes    IP OT Goal: Work with pt to increase awareness of how symptoms can impact performance on goal-directed tasks and life management skills. Will provide opportunities to build on coping strategies to manage symptoms during hospitalization and after discharge.    Plan for Next Treatment: Provide graded occupation-based activities for increased success and ongoing " assessment.     Renae Craft OT on 4/29/2021 at 3:57 PM

## 2021-04-29 NOTE — PROGRESS NOTES
[facility], [place]   Psychiatric Progress Note        Interim History:   The patient's care was discussed with the treatment team during the daily team meeting and/or staff's chart notes were reviewed. Staff report patient has been compliant with medications and leaves her room for meal times. Mariusz did participate in a group activity this morning for the first time, and it appeared to go well. She slept for 5.5 hours last night but had several nightmares. Patient has endorsed feeling anxious and depressed, and has had ongoing suicidal ideation since admission about driving her car into a [employer], but she contracted for safety.     Met with patient in the common room in the presence of a PA student. She states that her depression and suicidal ideation have remained the same since admission, and she contracts for safety. Mariusz had a phone call with her  last night that was very upsetting to her. She says that he blames her for everything going on, and he says he will have to work extra to pay for things now that she is hospitalized. We suggested that she try not to communicate with her  for now since their interactions are so upsetting for her, and she agrees to this. Patient feels more anxious today, and we reminded her to ask for prn hydroxyzine as she needs it. In regards to her orthostatic hypotension, her symptoms are better today after having her propranolol dose decreased. She had several nightmares again last night and is interested in starting prazosin to try to alleviate them. Patient has no other questions or concerns for us today.           Medications:       divalproex sodium extended-release  500 mg Oral At Bedtime     escitalopram  20 mg Oral Daily     gabapentin  800 mg Oral TID     melatonin  3 mg Oral At Bedtime     multivitamin w/minerals  1 tablet Oral Daily     polyethylene glycol  17 g Oral Daily     propranolol  20 mg Oral BID     traZODone  150 mg  "Oral At Bedtime     Vitamin D3  50 mcg Oral Daily          Allergies:     Allergies   Allergen Reactions     Honeybees [Bees] Anaphylaxis     Sulfamethoxazole-Trimethoprim Nausea and Vomiting     Codeine      Sulfa Drugs      Amoxicillin Rash     Codeine Rash          Labs:   No results found for this or any previous visit (from the past 24 hour(s)).       Psychiatric Examination:     /70   Pulse 78   Temp 96.2  F (35.7  C) (Tympanic)   Resp 16   Ht 1.702 m (5' 7\")   Wt 74.8 kg (165 lb)   SpO2 99%   BMI 25.84 kg/m    Weight is 165 lbs 0 oz  Body mass index is 25.84 kg/m .    Orthostatic Vitals       Most Recent      Sitting Orthostatic /75 04/29 0805    Sitting Orthostatic Pulse (bpm) 70 04/29 0805    Standing Orthostatic /70 04/29 0805    Standing Orthostatic Pulse (bpm) 90 04/29 0805         Appearance: awake, alert, adequately groomed, dressed in hospital scrubs and appeared as age stated  Attitude:  cooperative  Eye Contact:  good  Mood:  anxious and depressed  Affect:  mood congruent, intensity is flat, constricted mobility and restricted range  Speech:  clear, coherent  Psychomotor Behavior:  no evidence of tardive dyskinesia, dystonia, or tics  Throught Process:  linear and goal oriented  Associations:  no loose associations  Thought Content:  passive suicidal ideation present, thoughts of self-harm, which are remained the same, no auditory hallucinations present and no visual hallucinations present  Insight:  fair  Judgement:  limited  Oriented to:  time, person, and place  Attention Span and Concentration:  intact  Recent and Remote Memory:  intact    Clinical Global Impressions  First: 6/4 4/28/2021     Most recent:          Precautions:     Behavioral Orders   Procedures     Code 1 - Restrict to Unit     Discontinue 1:1 attendant for suicide risk     Order Specific Question:   I have performed an in person assessment of the patient     Answer:   Based on this assessment the " patient no longer requires a one on one attendant at this point in time.     Routine Programming     As clinically indicated     Status 15     Every 15 minutes.     Suicide precautions     Patients on Suicide Precautions should have a Combination Diet ordered that includes a Diet selection(s) AND a Behavioral Tray selection for Safe Tray - with utensils, or Safe Tray - NO utensils            DIagnoses:   1. Major depressive disorder, recurrent and severe without psychotic features  2. Posttraumatic stress disorder   3. Past diagnosis of panic disorder without agoraphobia         Plan:   Patient's propranolol dose was decreased yesterday, and her orthostatic hypotension is no longer present. We will start her on prazosin 1 mg at bedtime to help with her nightmares secondary to PTSD today. She was reminded to take prn hydroxyzine 50 mg to manage periods of heightened anxiety and agrees to this. Patient should also avoid communicating with her  for now until she is more stable. She remains in the hospital voluntarily, and we will continue to provide support and structure to her. Patient was again strongly encouraged to leave her room more frequently and participate in group activities.

## 2021-04-29 NOTE — PLAN OF CARE
"Pt started the shift resting in bed. She got up for dinner and sat in the lounge watching movies with peers most of the evening. She told writer that her anxiety was much increased due to a code earlier that caused her to have to stay in her room for extended time. She stated \"it's different when you choose to be in your room and when you have to stay in your room\". Writer provided support and let pt know that next time she can let us know how she is feeling and get PRN medication, tea, aromatherapy. Pt states aromatherapy is very helpful for her anxiety. Pt was given lavender patch at bedtime. She endorses racing thoughts. She states she carries her anxiety in her chest and neck and feels chest tightness tonight related to anxiety. Pt states depression has not improved since coming here and she wants to get her medications changed. She states she would like to stop taking depakote as she feels her main issues are depression and anxiety which are resulting from a lot of stressors in the past year. Pt endorses continued SI thoughts which were increased tonight due to anxiety. She does not have intent to do anything, though admits she has been thinking about ways she could harm herself here. She is able to contract for safety on the unit at this time. She states she will let staff know when these thoughts intensify so we can try to help her manage them and feel safe.     She complains of dizziness when she stands up, likely orthostatic hypotension based on morning vital signs. Her Propranolol was decreased from 40 mg BID to 20 mg BID and we discussed that this could help with the dizziness. Writer encouraged pt to continue drinking fluids. Pt verbalized understanding.  "

## 2021-04-29 NOTE — PLAN OF CARE
Assessment/Intervention/Current Symtoms and Care Coordination  YOUNG (writer) met with trx team, provided update, and reviewed pt's chart. CTC connected with day trx this morning, who confirmed that pt's spot was being saved and that there was no time limit on her spot being held a this time. They currently plan on pt returning to the day trx program once she stabilizes and discharges. CTC met with pt today to check in and address any concerns/questions pt had. Pt reported her depression feeling worse to the same today as yesterday. Pt identified talking with her  as the source of her decreased mood today. She admitted that she shouldn't have called him, and felt that when she tried to update him about how she was doing, he turned the conversation to become all about him. Pt was able to identify talking with her mom today as helping her feel better. Pt stated that otherwise she has no questions or concerns at this time. Pt identified having a lot on her mind about the future to think about as a big stressor right now. [facility] reviewed MICHAEL's with pt for her outpatient providers, which she confirmed and signed. MICHAEL's were placed in pt's chart. [facility] also informed pt that her spot at Day Treatment (at [facility]) was being held for her and that she wasn't going to loose it by being IP, as long as she wanted to return. Pt expressed being glad to hear this news.      Discharge Plan or Goal  Pt will likely discharge back to day treatment once stabilized.     Barriers to Discharge   Safe discharge plan, medication evaluation, and symptom presentation (ongoing depression and SI)     Referral Status  Pt will likely return to day treatment once stable, so no referrals needed at this time.     Legal Status  VOLUNTARY

## 2021-04-29 NOTE — PLAN OF CARE
NOC Shift Report     Pt in bed at beginning of shift, breathing quiet and unlabored. Pt slept through shift. Pt slept 5.5 hours.      No pt complaints or concerns at this time.      No PRNs given. Will continue to monitor.      Precautions: Suicide

## 2021-04-30 PROCEDURE — 250N000013 HC RX MED GY IP 250 OP 250 PS 637: Performed by: PSYCHIATRY & NEUROLOGY

## 2021-04-30 PROCEDURE — 99232 SBSQ HOSP IP/OBS MODERATE 35: CPT | Performed by: PSYCHIATRY & NEUROLOGY

## 2021-04-30 PROCEDURE — 124N000002 HC R&B MH UMMC

## 2021-04-30 RX ADMIN — MELATONIN TAB 3 MG 3 MG: 3 TAB at 22:08

## 2021-04-30 RX ADMIN — CYCLOBENZAPRINE 10 MG: 10 TABLET, FILM COATED ORAL at 22:09

## 2021-04-30 RX ADMIN — PRAZOSIN HYDROCHLORIDE 1 MG: 1 CAPSULE ORAL at 22:08

## 2021-04-30 RX ADMIN — MULTIPLE VITAMINS W/ MINERALS TAB 1 TABLET: TAB at 08:53

## 2021-04-30 RX ADMIN — PROPRANOLOL HYDROCHLORIDE 20 MG: 10 TABLET ORAL at 20:41

## 2021-04-30 RX ADMIN — TRAZODONE HYDROCHLORIDE 150 MG: 150 TABLET ORAL at 22:08

## 2021-04-30 RX ADMIN — CYCLOBENZAPRINE 10 MG: 10 TABLET, FILM COATED ORAL at 14:37

## 2021-04-30 RX ADMIN — GABAPENTIN 800 MG: 100 CAPSULE ORAL at 13:12

## 2021-04-30 RX ADMIN — GABAPENTIN 800 MG: 100 CAPSULE ORAL at 08:53

## 2021-04-30 RX ADMIN — GABAPENTIN 800 MG: 100 CAPSULE ORAL at 20:41

## 2021-04-30 RX ADMIN — Medication 50 MCG: at 08:53

## 2021-04-30 RX ADMIN — PROPRANOLOL HYDROCHLORIDE 20 MG: 10 TABLET ORAL at 08:53

## 2021-04-30 RX ADMIN — ESCITALOPRAM OXALATE 20 MG: 10 TABLET ORAL at 08:53

## 2021-04-30 RX ADMIN — POLYETHYLENE GLYCOL 3350 17 G: 17 POWDER, FOR SOLUTION ORAL at 08:53

## 2021-04-30 ASSESSMENT — ACTIVITIES OF DAILY LIVING (ADL)
ORAL_HYGIENE: INDEPENDENT
HYGIENE/GROOMING: INDEPENDENT
DRESS: INDEPENDENT
LAUNDRY: WITH SUPERVISION

## 2021-04-30 NOTE — PLAN OF CARE
Assessment/Intervention/Current Symtoms and Care Coordination  YOUNG (writer) met with trx team, provided update, and reviewed pt's chart. CTC met with pt today, who stated that she was feeling better today. Pt did ask what would happen if she didn't feel ready for discharge next Monday and CTC reminded her that determining when she would discharge was part of talking with her attending. CTC encouraged her to be honest with her attending when discussing readiness for discharge next week. Furthermore, CTC praised pt for spending more time in the lounge today and encouraged pt to attend groups this weekend as well. Pt stated that her goal is to finish her book this weekend and will try to stay out of her room, and maybe attend groups. Pt confirmed that she still plans on returning to Day Trx upon discharge when YOUNG was discussing discharge planning with her today. Pt looked brighter today then yesterday when talking with YOUNG.      Discharge Plan or Goal  Pt will likely discharge back to day treatment once stabilized.     Barriers to Discharge   Safe discharge plan, medication evaluation, and symptom presentation (ongoing depression and SI)     Referral Status  Pt will likely return to day treatment once stable, so no referrals needed at this time.     Legal Status  VOLUNTARY

## 2021-04-30 NOTE — PLAN OF CARE
Pt has been visible in the milieu for most of the shift. She remains blunted and withdrawn but does brighten upon conversation. She is minimally social with peers. She endorsed thoughts about slamming her hand in the door and also punching the wall today. She stated she has punched the wall before in the past out of anger, but today she was thinking about doing it to feel physical pain instead of emotional pain. She endorses SI thoughts and wishes to be dead. She stated that when she was ruminating on these thoughts in her room, she was able to make the choice to come out into the lounge and go to groups and that helped distract her and helped her feel better. She contracts for safety and states she is hopeful that tomorrow will be a better day. Her goal is to not call her  tomorrow because that put her in a very depressed mood and high anxiety all day today. She reported less dizziness today, though stated she did not drink as many fluids as usual today. Writer encouraged her to drink more fluids and she verbalized that she would.     She asked if we have any guided meditations she could have access to here because that is a helpful coping mechanism for her. Writer informed her that we often do yoga meditations on the weekends and she seemed excited about that.     Pt given PRN tylenol, flexeril and hot pack tonight to help with chronic back pain and spasms.

## 2021-04-30 NOTE — PLAN OF CARE
Music Therapy Group note    Total time in session: 30 minutes    Number of patients in group: 4    Scope of service: holistic    Patient progress: initial encounter    Patient response/reaction to treatment intervention(s): Mariusz entered group last, was calm and cooperative.  She sat listening to the music she chose with eyes closed in a highly relaxed state.      Darlene Moran MT-BC  Board-Certified Music Therapist

## 2021-04-30 NOTE — PROGRESS NOTES
"[facility], [place]   Psychiatric Progress Note        Interim History:   The patient's care was discussed with the treatment team during the daily team meeting and/or staff's chart notes were reviewed. Staff report patient has been compliant with medications and leaves her room for meal times. She has participated in some group activities but spends more time isolated in her room. Patient slept for 7 hours last night and took prazosin at bedtime for the first time for nightmares. She has had ongoing suicidal ideation since admission but developed some thoughts of harming herself by slamming her hand in a door yesterday (4/29/21). Patient did contract for safety with staff at that time.    Met with patient in her bedroom in the presence of a PA student and 2 nursing students. She states that she slept much better last night because the nightmares were \"less vivid\" while on prazosin and she woke up only once. Patient feels that her depression has slightly improved since admission, but her  tried calling this morning which heightened her anxiety. She did not accept his phone call, as we advised yesterday, but just the thought of talking to him made her feel more anxious. Patient has continued to experience ongoing SI and thoughts of self-harm but contracted for safety. She requests to discontinue Depakote (we have been tapering this) as she feels it is not helpful for her mood. Patient mentioned that she has not been experiencing any dizziness since decreasing  Her propranolol dose. Patient said that she is looking forward to possibly being discharged from the hospital next week. She has no other questions or concerns at this time.          Medications:              escitalopram  20 mg Oral Daily     gabapentin  800 mg Oral TID     melatonin  3 mg Oral At Bedtime     multivitamin w/minerals  1 tablet Oral Daily     polyethylene glycol  17 g Oral Daily     prazosin  1 mg Oral At " "Bedtime     propranolol  20 mg Oral BID     traZODone  150 mg Oral At Bedtime     Vitamin D3  50 mcg Oral Daily          Allergies:     Allergies   Allergen Reactions     Honeybees [Bees] Anaphylaxis     Sulfamethoxazole-Trimethoprim Nausea and Vomiting     Codeine      Sulfa Drugs      Amoxicillin Rash     Codeine Rash          Labs:   No results found for this or any previous visit (from the past 24 hour(s)).       Psychiatric Examination:     /75   Pulse 75   Temp 98.5  F (36.9  C) (Oral)   Resp 16   Ht 1.702 m (5' 7\")   Wt 74.8 kg (165 lb)   SpO2 99%   BMI 25.84 kg/m    Weight is 165 lbs 0 oz  Body mass index is 25.84 kg/m .    Orthostatic Vitals       Most Recent      Sitting Orthostatic /75 04/30 0811    Sitting Orthostatic Pulse (bpm) 75 04/30 0811    Standing Orthostatic /81 04/30 0811    Standing Orthostatic Pulse (bpm) 92 04/30 0811         Appearance: awake, alert, adequately groomed, dressed in hospital scrubs and appeared as age stated  Attitude:  cooperative  Eye Contact:  good  Mood:  anxious and depressed  Affect:  mood congruent, intensity is flat, constricted mobility and restricted range  Speech:  clear, coherent  Psychomotor Behavior:  no evidence of tardive dyskinesia, dystonia, or tics  Throught Process:  linear and goal oriented  Associations:  no loose associations  Thought Content:  passive suicidal ideation present, thoughts of self-harm, which are remained the same, no auditory hallucinations present and no visual hallucinations present  Insight:  fair  Judgement:  limited  Oriented to:  time, person, and place  Attention Span and Concentration:  intact  Recent and Remote Memory:  intact    Clinical Global Impressions  First: 6/4 4/28/2021     Most recent:          Precautions:     Behavioral Orders   Procedures     Code 1 - Restrict to Unit     Discontinue 1:1 attendant for suicide risk     Order Specific Question:   I have performed an in person assessment of the " patient     Answer:   Based on this assessment the patient no longer requires a one on one attendant at this point in time.     Routine Programming     As clinically indicated     Status 15     Every 15 minutes.     Suicide precautions     Patients on Suicide Precautions should have a Combination Diet ordered that includes a Diet selection(s) AND a Behavioral Tray selection for Safe Tray - with utensils, or Safe Tray - NO utensils            DIagnoses:   1. Major depressive disorder, recurrent and severe without psychotic features  2. Posttraumatic stress disorder   3. Past diagnosis of panic disorder without agoraphobia         Plan:     Patient remains in the hospital voluntarily, and we will continue to provide support and structure to her. Will discontinue Depakote ER. She plans to continue to avoid communicating with her  for now until she is more stable. Patient was again strongly encouraged to leave her room more frequently and participate in group activities. The plan will be to set her up with DBT and have her return to our day program when she is ready for discharge, which will likely be early next week.

## 2021-04-30 NOTE — PLAN OF CARE
NOC Shift Report     Pt in bed at beginning of shift, breathing quiet and unlabored. Pt slept through shift. Pt slept 7 hours.      No pt complaints or concerns at this time.      No PRNs given. Will continue to monitor.      Precautions: Suicide

## 2021-05-01 PROCEDURE — 250N000013 HC RX MED GY IP 250 OP 250 PS 637: Performed by: PSYCHIATRY & NEUROLOGY

## 2021-05-01 PROCEDURE — 124N000002 HC R&B MH UMMC

## 2021-05-01 RX ADMIN — ESCITALOPRAM OXALATE 20 MG: 10 TABLET ORAL at 09:33

## 2021-05-01 RX ADMIN — GABAPENTIN 800 MG: 100 CAPSULE ORAL at 09:34

## 2021-05-01 RX ADMIN — PRAZOSIN HYDROCHLORIDE 1 MG: 1 CAPSULE ORAL at 21:16

## 2021-05-01 RX ADMIN — CYCLOBENZAPRINE 10 MG: 10 TABLET, FILM COATED ORAL at 12:02

## 2021-05-01 RX ADMIN — PROPRANOLOL HYDROCHLORIDE 20 MG: 10 TABLET ORAL at 09:33

## 2021-05-01 RX ADMIN — ACETAMINOPHEN 650 MG: 325 TABLET, FILM COATED ORAL at 21:16

## 2021-05-01 RX ADMIN — TRAZODONE HYDROCHLORIDE 150 MG: 150 TABLET ORAL at 21:15

## 2021-05-01 RX ADMIN — Medication 50 MCG: at 09:34

## 2021-05-01 RX ADMIN — GABAPENTIN 800 MG: 100 CAPSULE ORAL at 13:15

## 2021-05-01 RX ADMIN — MELATONIN TAB 3 MG 3 MG: 3 TAB at 21:16

## 2021-05-01 RX ADMIN — POLYETHYLENE GLYCOL 3350 17 G: 17 POWDER, FOR SOLUTION ORAL at 09:34

## 2021-05-01 RX ADMIN — PROPRANOLOL HYDROCHLORIDE 20 MG: 10 TABLET ORAL at 21:16

## 2021-05-01 RX ADMIN — GABAPENTIN 800 MG: 100 CAPSULE ORAL at 21:16

## 2021-05-01 RX ADMIN — MULTIPLE VITAMINS W/ MINERALS TAB 1 TABLET: TAB at 09:35

## 2021-05-01 NOTE — PLAN OF CARE
"Pt spent most of the shift in the milieu. Affect is blunted and she is withdrawn and not social with others. She did spend 45 minutes in her room tonight between 7:30 and 8:30 and around 8:30 she came to writer, tearful and showed writer that she had self harmed. She cut open 2 fingers on her right hand using the corner of the window in her room. The cuts were bleeding and medium depth. Writer cleaned them with wound  and put bacitracin and bandages on them. Pt states she is \"mad at myself\" for doing it. Writer contacted on call provider, José and received order to place pt on SIO 10 feet in order for her to have support from staff 24 hours a day. Writer asked pt what caused her to self harm and why she was unable to talk with staff before it happened. Pt stated that she was feeling very distressed due to talking to her  and 3 year old daughter on the phone. She misses her daughter and is worried that she is not spending enough time with her. Pt stated that \"I needed to feel pain\". Pt states \"I just don't want to be here (on earth)\", but states she does not want to be dead. She states she wishes she could kill herself but \"I couldn't do that to my girls\". Writer and pt discussed what would be helpful for pt as a safety plan and decided phone restriction and more frequent checking in with staff would be helpful. We agreed that she needs to focus on herself and the phone calls are only distressing her more. On-call provider placed PCO for phone restriction and pt is aware and in agreement.     PRN Flexeril 10 mg given with HS medications for back pain and spasms with some relief.    Merit's plan/goal for tomorrow: no phone calls, SIO 24 hours, frequently check in with her staff and let us know when she is having self-harm urges, focus on herself and her coping skills such as painting, aromatherapy, meditation.   "

## 2021-05-01 NOTE — PLAN OF CARE
Pt rates depression and anxiety both 6/10.  Pt denies any SI but states she does think maybe sometimes she would be better off dead.  Pt denies any SI plan.  Pt admits to having thoughts of slamming her hand in the door and will remain on SIO. Pt agreed to talk with staff if these thoughts occur.  Pt states she has a lot of stress in her marriage, career and not seeing her kids as much as she would like.  Pt states she feels a lot better today than yesterday.  Pt was out on unit watching TV and napped and read.  Pt is med compliant, pleasant and cooperative.

## 2021-05-02 PROCEDURE — 99207 PR CONSULT E&M CHANGED TO INITIAL LEVEL: CPT | Performed by: NURSE PRACTITIONER

## 2021-05-02 PROCEDURE — 124N000002 HC R&B MH UMMC

## 2021-05-02 PROCEDURE — 93010 ELECTROCARDIOGRAM REPORT: CPT | Performed by: INTERNAL MEDICINE

## 2021-05-02 PROCEDURE — 99221 1ST HOSP IP/OBS SF/LOW 40: CPT | Performed by: NURSE PRACTITIONER

## 2021-05-02 PROCEDURE — 93005 ELECTROCARDIOGRAM TRACING: CPT

## 2021-05-02 PROCEDURE — 250N000013 HC RX MED GY IP 250 OP 250 PS 637: Performed by: PSYCHIATRY & NEUROLOGY

## 2021-05-02 RX ORDER — LIDOCAINE 4 G/G
1-3 PATCH TOPICAL
Status: DISCONTINUED | OUTPATIENT
Start: 2021-05-02 | End: 2021-05-05 | Stop reason: HOSPADM

## 2021-05-02 RX ADMIN — MULTIPLE VITAMINS W/ MINERALS TAB 1 TABLET: TAB at 08:35

## 2021-05-02 RX ADMIN — TRAZODONE HYDROCHLORIDE 150 MG: 150 TABLET ORAL at 20:53

## 2021-05-02 RX ADMIN — GABAPENTIN 800 MG: 100 CAPSULE ORAL at 13:14

## 2021-05-02 RX ADMIN — GABAPENTIN 800 MG: 100 CAPSULE ORAL at 20:52

## 2021-05-02 RX ADMIN — PROPRANOLOL HYDROCHLORIDE 20 MG: 10 TABLET ORAL at 08:35

## 2021-05-02 RX ADMIN — ESCITALOPRAM OXALATE 20 MG: 10 TABLET ORAL at 08:35

## 2021-05-02 RX ADMIN — PRAZOSIN HYDROCHLORIDE 1 MG: 1 CAPSULE ORAL at 20:53

## 2021-05-02 RX ADMIN — GABAPENTIN 800 MG: 100 CAPSULE ORAL at 08:35

## 2021-05-02 RX ADMIN — Medication 50 MCG: at 08:35

## 2021-05-02 RX ADMIN — POLYETHYLENE GLYCOL 3350 17 G: 17 POWDER, FOR SOLUTION ORAL at 08:35

## 2021-05-02 RX ADMIN — MELATONIN TAB 3 MG 3 MG: 3 TAB at 20:54

## 2021-05-02 RX ADMIN — PROPRANOLOL HYDROCHLORIDE 20 MG: 10 TABLET ORAL at 20:53

## 2021-05-02 RX ADMIN — OLANZAPINE 10 MG: 10 TABLET, FILM COATED ORAL at 12:58

## 2021-05-02 ASSESSMENT — MIFFLIN-ST. JEOR: SCORE: 1485.15

## 2021-05-02 ASSESSMENT — ACTIVITIES OF DAILY LIVING (ADL)
HYGIENE/GROOMING: INDEPENDENT
ORAL_HYGIENE: INDEPENDENT
DRESS: INDEPENDENT
LAUNDRY: WITH SUPERVISION

## 2021-05-02 NOTE — PROGRESS NOTES
"SIB THOUGHTS AND URGES    Patient asked for a bandaide. Patient appeared shaky. She presented with a flat/sad affect. Writer asked if she was having anxiety, patient stated, \" Yeah it's pretty bad. I just have intrusive thoughts about my home life.\" Patient was asked if she has SI, SIB thoughts and urges. Patient stated, \" Yes unfortunately, a lot of thoughts and urges.\"  The plan is too continue monitor and she is currently on a 1:1 for continuous monitoring. Staff are aware.       PRN Zyprexa 10mg PO  Effectiveness: ZUNILDA, pt sleeping  "

## 2021-05-02 NOTE — PLAN OF CARE
"Patient experienced a somewhat challenging day on [facility] 3A.  She continues to be closely monitored as S.I.O., due to active and as yet unresolved suicidal ideation.See additional RN note from colleague RN for further information.  In the morning hours, patient did note mild shortness of breath and a \"sharp pain\" upon deep respiratory inspiration. She acknowledged that anxiety may have played some role in her symptoms.  Relevant information was communicated to medical NP, with appropriate action taken, including a direct assessment of patient. Later that morning and into the early afternoon, patient reported a lessening of her anxiety, as well as notable improvement in her ability to breathe easily and lessening of earlier reported inspirational pain.  She was fully \"medication compliant\", and appropriate within jacobsen milieu.  Will continue to monitor as prudent.      "

## 2021-05-02 NOTE — CONSULTS
Internal Medicine Consult - Initial Visit       Mariusz Huntley MRN# [id number]   YOB: 1985 Age: 35 year old   Date of Admission: 4/26/2021  PCP: Clinic, [facility] [place]    Date of Service: 5/2/2021  Referring Provider: Adan Kumar MD  Reason for Consult: Dyspnea and chest discomfort          Assessment and Recommendations:   Mariusz Huntley is a 35 year old female with a history of depression and anxiety admitted to station 10 for worsening depression and SI.  Internal medicine is consulted for acute dyspnea and chest discomfort.      # SI  # Depression, anxiety   - Management per Psychiatry     # Dyspnea, chest discomfort - Likely 2/2 anxiety vs musculoskeletal vs other.  Reports onset of chest discomfort and pain with deep inspiration early this AM.  Pain is reproducible over midsternal area.  EKG w/ NSR, normal QTc.  VS stable, normal effort on room air.   - Optimal management of anxiety symptoms  - Can offer Tylenol and Lidoderm patches   - Monitor closely, if recurrent, please notify IM     Medicine will sign off, no further recommendations at this time.  Please feel free to reconsult if patient's symptoms worsen or if new problems arise.  Thank you for the opportunity to care for this patient.       Marsha Zabala, CNP, APRN  Internal Medicine [facility] Hospitalist  [facility]  Pager [phone]           History of Present Illness:   History is obtained from the patient and medical record.     This patient is a 35 year old female with a history of depression and anxiety admitted to station 10 for worsening depression and SI.     Internal Medicine service was asked to see patient for acute dyspnea and chest discomfort.  Mariusz is resting in bed.  She reports onset of chest discomfort this AM on waking up.  Describes it as sharp, midsternal.  She feels that it is worse with taking a deep breath.  The pain is reproducible with palpation.   Denies issues w/ GERD or acid reflux. No nausea or vomiting.  No underlying cardiac or respiratory issues.            Review of Systems:   A 10 point ROS was performed and negative unless otherwise noted in HPI.           Past Medical History:   Reviewed and updated in Epic.  Past Medical History:   Diagnosis Date     Anxiety      Depression      Intractable chronic migraine without aura      Panic attack      PTSD (post-traumatic stress disorder)              Past Surgical History:   Reviewed and updated in Epic.  Past Surgical History:   Procedure Laterality Date      SECTION      x 2             Social History:   Reviewed and updated in [employer].  Social History     Socioeconomic History     Marital status:      Spouse name: Not on file     Number of children: Not on file     Years of education: Not on file     Highest education level: Not on file   Occupational History     Not on file   Social Needs     Financial resource strain: Not on file     Food insecurity     Worry: Not on file     Inability: Not on file     Transportation needs     Medical: Not on file     Non-medical: Not on file   Tobacco Use     Smoking status: Never Smoker     Smokeless tobacco: Never Used   Substance and Sexual Activity     Alcohol use: Not Currently     Drug use: Yes     Types: Marijuana     Comment: 2 weeks ago     Sexual activity: Not on file   Lifestyle     Physical activity     Days per week: Not on file     Minutes per session: Not on file     Stress: Not on file   Relationships     Social connections     Talks on phone: Not on file     Gets together: Not on file     Attends Muslim service: Not on file     Active member of club or organization: Not on file     Attends meetings of clubs or organizations: Not on file     Relationship status: Not on file     Intimate partner violence     Fear of current or ex partner: Not on file     Emotionally abused: Not on file     Physically abused: Not on file     Forced sexual  activity: Not on file   Other Topics Concern     Parent/sibling w/ CABG, MI or angioplasty before 65F 55M? Not Asked   Social History Narrative    ** Merged History Encounter **                   Family History:   Reviewed and updated in Epic.  Family History   Problem Relation Age of Onset     Hypertension Maternal Grandmother      Lung Cancer Maternal Grandmother      Hypertension Maternal Grandfather      Lung Cancer Maternal Grandfather      Myocardial Infarction Paternal Grandmother      Diabetes Paternal Grandmother      Hypertension Paternal Grandmother      Lung Cancer Paternal Grandmother      Depression Mother              Allergies:     Allergies   Allergen Reactions     Honeybees [Bees] Anaphylaxis     Sulfamethoxazole-Trimethoprim Nausea and Vomiting     Codeine      Sulfa Drugs      Amoxicillin Rash     Codeine Rash             Medications:     Current Facility-Administered Medications   Medication     acetaminophen (TYLENOL) tablet 650 mg     alum & mag hydroxide-simethicone (MAALOX) suspension 30 mL     cyclobenzaprine (FLEXERIL) tablet 10 mg     EPINEPHrine (ADRENALIN) kit 0.3 mg     escitalopram (LEXAPRO) tablet 20 mg     gabapentin (NEURONTIN) capsule 800 mg     hydrOXYzine (ATARAX) tablet 50 mg     Lidocaine (LIDOCARE) 4 % Patch 1-3 patch    And     lidocaine patch in PLACE     melatonin tablet 3 mg     multivitamin w/minerals (THERA-VIT-M) tablet 1 tablet     OLANZapine (zyPREXA) tablet 10 mg    Or     OLANZapine (zyPREXA) injection 10 mg     polyethylene glycol (MIRALAX) Packet 17 g     polyethylene glycol (MIRALAX) Packet 17 g     prazosin (MINIPRESS) capsule 1 mg     propranolol (INDERAL) tablet 20 mg     senna-docusate (SENOKOT-S/PERICOLACE) 8.6-50 MG per tablet 1 tablet     traZODone (DESYREL) tablet 150 mg     traZODone (DESYREL) tablet 50 mg     Vitamin D3 (CHOLECALCIFEROL) tablet 50 mcg            Physical Exam:   Blood pressure 125/83, pulse 76, temperature 98.1  F (36.7  C),  "temperature source Oral, resp. rate 16, height 1.702 m (5' 7\"), weight 75.3 kg (165 lb 14.4 oz), SpO2 96 %, not currently breastfeeding.  Body mass index is 25.98 kg/m .    GENERAL: Alert and oriented x 3. Well nourished, well developed.  No acute distress.    HEENT: Normocephalic, atraumatic. Anicteric sclera. Mucous membranes moist.   CV: RRR. S1, S2. No murmurs appreciated.   RESPIRATORY: Effort normal on . Lungs CTAB with no wheezing, rales, or rhonchi.   GI: Abdomen soft and non distended, bowel sounds present x all 4 quadrants. No tenderness, rebound, or guarding.   NEUROLOGICAL: No focal deficits. Follows commands.  Strength equal in upper and lower extremities.   MUSCULOSKELETAL: No joint swelling or tenderness. Moves all extremities.   EXTREMITIES: No gross deformities. No peripheral edema.   SKIN: Grossly warm, dry, and intact. No jaundice. No rashes.             Data:   I personally reviewed the following studies:    ROUTINE IP LABS (Last four results)  CMP   Recent Labs   Lab 04/27/21  0745      POTASSIUM 4.4   CHLORIDE 107   CO2 26   ANIONGAP 5   GLC 97   BUN 11   CR 0.78   CASSANDRA 8.2*   PROTTOTAL 6.7*   ALBUMIN 3.2*   BILITOTAL 0.6   ALKPHOS 35*   AST 11   ALT 20     CBC   Recent Labs   Lab 04/27/21  0745   WBC 8.1   RBC 4.34   HGB 13.4   HCT 40.0   MCV 92   MCH 30.9   MCHC 33.5   RDW 12.8   *     INR No lab results found in last 7 days.      Unresulted Labs Ordered in the Past 30 Days of this Admission     No orders found from 3/27/2021 to 4/27/2021.             "

## 2021-05-02 NOTE — PLAN OF CARE
Pt states she is feeling a bit better today and feels the SIO and phone restriction have been helpful. She is glad to be focusing on herself and has the insight to realize that it is healthy for her. She states her depression is a bit lower today and feels like her energy level is a bit higher. She still endorses self harm thoughts, but much less often than yesterday. She states she sometimes thinks she would be better off dead, but denies SI thoughts or plan at this time. She really enjoyed participating in yoga group this afternoon and meditation this evening. She has been utilizing those along with herbal tea and lavender as very helpful coping skills. She states that since starting Prazosin a few days ago, she has been having less and less nightmares and states she slept very well last night. We discussed the importance of sleep hygiene to mental health. Pt verbalized understanding and happiness that the Prazosin is helping her sleep better and feel more rested. Her affect is more full range today and she has been minimally social with others. She states she is trying to focus on drinking water as well and has been feeling less dizzy today. Writer gave her positive feedback about drinking more water.     Pt requested and was given PRN tylenol with her HS medications to help with shoulder pain.

## 2021-05-03 LAB
INTERPRETATION ECG - MUSE: NORMAL
TROPONIN I SERPL-MCNC: <0.015 UG/L (ref 0–0.04)

## 2021-05-03 PROCEDURE — 93010 ELECTROCARDIOGRAM REPORT: CPT | Performed by: INTERNAL MEDICINE

## 2021-05-03 PROCEDURE — 84484 ASSAY OF TROPONIN QUANT: CPT | Performed by: INTERNAL MEDICINE

## 2021-05-03 PROCEDURE — 99232 SBSQ HOSP IP/OBS MODERATE 35: CPT | Performed by: PSYCHIATRY & NEUROLOGY

## 2021-05-03 PROCEDURE — 124N000002 HC R&B MH UMMC

## 2021-05-03 PROCEDURE — 36415 COLL VENOUS BLD VENIPUNCTURE: CPT | Performed by: INTERNAL MEDICINE

## 2021-05-03 PROCEDURE — 93005 ELECTROCARDIOGRAM TRACING: CPT

## 2021-05-03 PROCEDURE — 250N000013 HC RX MED GY IP 250 OP 250 PS 637: Performed by: PSYCHIATRY & NEUROLOGY

## 2021-05-03 RX ADMIN — PROPRANOLOL HYDROCHLORIDE 20 MG: 10 TABLET ORAL at 08:19

## 2021-05-03 RX ADMIN — PROPRANOLOL HYDROCHLORIDE 20 MG: 10 TABLET ORAL at 20:44

## 2021-05-03 RX ADMIN — MULTIPLE VITAMINS W/ MINERALS TAB 1 TABLET: TAB at 08:19

## 2021-05-03 RX ADMIN — ESCITALOPRAM OXALATE 20 MG: 10 TABLET ORAL at 08:19

## 2021-05-03 RX ADMIN — MELATONIN TAB 3 MG 3 MG: 3 TAB at 21:35

## 2021-05-03 RX ADMIN — GABAPENTIN 800 MG: 100 CAPSULE ORAL at 08:18

## 2021-05-03 RX ADMIN — Medication 50 MCG: at 08:19

## 2021-05-03 RX ADMIN — TRAZODONE HYDROCHLORIDE 150 MG: 150 TABLET ORAL at 21:36

## 2021-05-03 RX ADMIN — POLYETHYLENE GLYCOL 3350 17 G: 17 POWDER, FOR SOLUTION ORAL at 08:18

## 2021-05-03 RX ADMIN — GABAPENTIN 800 MG: 100 CAPSULE ORAL at 20:44

## 2021-05-03 RX ADMIN — GABAPENTIN 800 MG: 100 CAPSULE ORAL at 13:39

## 2021-05-03 ASSESSMENT — ACTIVITIES OF DAILY LIVING (ADL)
LAUNDRY: WITH SUPERVISION
ORAL_HYGIENE: INDEPENDENT
HYGIENE/GROOMING: INDEPENDENT
DRESS: INDEPENDENT

## 2021-05-03 NOTE — PLAN OF CARE
Assessment/Intervention/Current Symtoms and Care Coordination  CTC (writer) met with trx team, provided update, and reviewed pt's chart. Mariusz continues to report feeling depressed and not ready to discharge yet. Pt was in and out of the milieu, mostly reading her book. Mariusz did not attend any groups today. Pt was taken off her SIO after talking with the attending and will likely discharge either Tuesday or Wednesday of this week. Pt continues to be OK with returning to day trx upon discharge. Overall pt appears to be doing better upon presentation.      Discharge Plan or Goal  Pt will likely discharge back to day treatment once stabilized.     Barriers to Discharge   Safe discharge plan, medication evaluation, and symptom presentation (ongoing depression and SI)     Referral Status  Pt will likely return to day treatment once stable, so no referrals needed at this time.     Legal Status  VOLUNTARY

## 2021-05-03 NOTE — PROGRESS NOTES
"[facility], [place]   Psychiatric Progress Note        Interim History:   The patient's care was discussed with the treatment team during the daily team meeting and/or staff's chart notes were reviewed. Staff report patient received phone calls from her , daughter, and mother on 4/30/21 that made her upset. Patient then participated in self-injurious behaviors (cut fingers on the window sill) in order to \"feel physical pain instead of emotional pain.\" Patient was placed on an SIO for her ongoing suicidal ideation and SIB. She has been compliant with medications and leaves her room for meal times. Patient has participated in some group activities but spends more time isolated in her room. She endorsed some chest pain over the weekend and was evaluated by IM; her chest pain was thought to be due to anxiety or musculoskeletal in origin.     Met with patient in the common room in the presence of a PA student. Merit stated that she is doing okay today but still endorses significant anxiety and depression. The phone calls of her  she received over the weekend made her miss her children and feel stressed about the future of her marriage (they are currently ). She said that she continues to have thoughts of harming herself, but she contracted for safety. Patient felt she is safe enough to be removed from SIO here on the unit, but she does not feel safe enough to be discharged to home. She admits, though, that her Suicidal ideation is chronic and promises to notify staff about Self injurious behavior/thoughts in the future. We specifically talked to the patient again about going to DBT groups after hospital discharge to learn helpful coping skills, and she indicated that she would be interested in that. Patient had no other questions or concerns for us today.          Medications:       escitalopram  20 mg Oral Daily     gabapentin  800 mg Oral TID     lidocaine   " "Transdermal Q8H     melatonin  3 mg Oral At Bedtime     multivitamin w/minerals  1 tablet Oral Daily     polyethylene glycol  17 g Oral Daily     prazosin  1 mg Oral At Bedtime     propranolol  20 mg Oral BID     traZODone  150 mg Oral At Bedtime     Vitamin D3  50 mcg Oral Daily          Allergies:     Allergies   Allergen Reactions     Honeybees [Bees] Anaphylaxis     Sulfamethoxazole-Trimethoprim Nausea and Vomiting     Codeine      Sulfa Drugs      Amoxicillin Rash     Codeine Rash          Labs:   No results found for this or any previous visit (from the past 24 hour(s)).       Psychiatric Examination:     /81   Pulse 67   Temp 98  F (36.7  C) (Oral)   Resp 16   Ht 1.702 m (5' 7\")   Wt 75.3 kg (165 lb 14.4 oz)   SpO2 96%   BMI 25.98 kg/m    Weight is 165 lbs 14.4 oz  Body mass index is 25.98 kg/m .    Orthostatic Vitals       Most Recent      Sitting Orthostatic /67 05/03 0816    Sitting Orthostatic Pulse (bpm) 65 05/03 0816    Standing Orthostatic /72 05/03 0816    Standing Orthostatic Pulse (bpm) 76 05/03 0816         Appearance: awake, alert, adequately groomed, dressed in hospital scrubs and appeared as age stated  Attitude:  cooperative  Eye Contact:  good  Mood: less anxious and depressed  Affect:  mood incongruent, intensity is flat, constricted mobility and restricted range  Speech:  clear, coherent  Psychomotor Behavior:  no evidence of tardive dyskinesia, dystonia, or tics  Throught Process:  linear  Associations:  no loose associations  Thought Content:  passive suicidal ideation present, thoughts of self-harm, which are remained the same, no auditory hallucinations present and no visual hallucinations present  Insight:  fair  Judgement:  limited  Oriented to:  time, person, and place  Attention Span and Concentration:  intact  Recent and Remote Memory:  intact    Clinical Global Impressions  First: 6/4 4/28/2021     Most recent:          Precautions:     Behavioral Orders "   Procedures     Code 1 - Restrict to Unit     Discontinue 1:1 attendant for suicide risk     Order Specific Question:   I have performed an in person assessment of the patient     Answer:   Based on this assessment the patient no longer requires a one on one attendant at this point in time.     Routine Programming     As clinically indicated     Self Injury Precaution     Status 15     Every 15 minutes.     Suicide precautions     Patients on Suicide Precautions should have a Combination Diet ordered that includes a Diet selection(s) AND a Behavioral Tray selection for Safe Tray - with utensils, or Safe Tray - NO utensils            DIagnoses:   1. Major depressive disorder, recurrent and severe without psychotic features  2. Posttraumatic stress disorder   3. Past diagnosis of panic disorder without agoraphobia         Plan:   Patient remains in the hospital voluntarily, and we will continue to provide support and structure to her. She states that she feels unsafe to be discharged today because of her suicidal ideation and thoughts of self-harm, but safe enough for SIO to be discontinued. No medication changes today. Patient was again strongly encouraged to leave her room more frequently and participate in group activities. The plan will be to set her up with DBT and have her return to our day program when she is ready for discharge, which will likely be within the next few days.

## 2021-05-03 NOTE — PLAN OF CARE
Shift Summary  Psych  SIO for self injury behavior and suicidal ideation cancelled since pt does not have SIB urges or SI thought at this time. Took shower. Seems depressed and flat. Visible in milieu ,but withdrawn to self and isolative. Denies any suicidal ideation, homicidal ideation, Self injury behavior, hallucination or racing thought. No evidence of psychosis, paranoid, delusional thoughts or disorganized behavior.   Prn: none  Physical  Pt alert and oriented x 3. Does not appear to be in any kind of distress or pain. Vital sings WNL (see flow sheet for details). Slept 6.75 hours last night. Good medication compliance is noted. Seems tolerating medications well. No side effect reported by pt or noted by this writer. Appetite adequate. No problem with bowel and bladder.   Prn: none  Continue to monitor pt's status Q 15 minutes and stabilize the patient's symptoms with the use of medications and therapeutic programming.
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
## 2021-05-03 NOTE — PLAN OF CARE
NOC Shift Report     Pt in bed at beginning of shift, breathing quiet and unlabored. Pt slept through shift. Pt slept 6.75 hours.      No pt complaints or concerns at this time.      No PRNs given. Will continue to monitor.      Precautions: Suicide  Pt continues on SIO this shift and Pt was noted observed to engage in any SIB.

## 2021-05-03 NOTE — PLAN OF CARE
"Pt has been in the milieu for most of the shift, mostly reading. She states she is happy her concentration has been improving and she is able to read her book without problems. She did have phone calls with  and daughter today and she states they went well and did not upset her as the calls had before. She states she is feeling much better, overall with decreased depression and decreased anxiety. She still endorses some SIB thoughts, but states there have been less. She denies any urges to harm herself. She states she still passively wishes she was dead at times. Affect has been a bit brighter and more full range today.     She states she is not feeling safe to go home yet. Pt states she would like to have 1 more full day here off of the SIO to assess how she does on her own. Writer encouraged her to make sure she is feeling safe before discharge. Writer also encouraged her to make sure she is talking to staff if SIB thoughts turn to urges. She verbalized that she would do this.     Around 2045 this evening, pt complains of \"palpitations\". She stated this had been happening for about an hour. Writer was able to feel radial pulse and felt a bit of a pause and speed up in her otherwise regular rhythm which seemed like a PVC. Pt also complains of pain to left chest, just left of sternum. Pt denies SOB, pain radiating, tingling and pressure. Writer did explain to pt that palpitations are a common side effect of her Prazosin which she recently started. Writer administered scheduled Propranolol 20 mg.     Writer contacted on call, Naegele, and received order for EKG stat and to follow up with medical house physician. EKG came back abnormal and house physician was paged. He ordered stat troponin which was drawn by lab. Upon review of EKG, house physician states he is not concerned about EKG and this was communicated to Merit. He did recommend to hold HS Prazosin which was done.     Troponin came back negative. Pt " stated around 2245 tonight, that the palpitations were worse when she was sitting and standing and seemed to settle down when she laid down in bed.

## 2021-05-04 LAB — INTERPRETATION ECG - MUSE: NORMAL

## 2021-05-04 PROCEDURE — 99232 SBSQ HOSP IP/OBS MODERATE 35: CPT | Performed by: PSYCHIATRY & NEUROLOGY

## 2021-05-04 PROCEDURE — 250N000013 HC RX MED GY IP 250 OP 250 PS 637: Performed by: PSYCHIATRY & NEUROLOGY

## 2021-05-04 PROCEDURE — 124N000002 HC R&B MH UMMC

## 2021-05-04 RX ORDER — HYDROXYZINE HYDROCHLORIDE 50 MG/1
50 TABLET, FILM COATED ORAL EVERY 4 HOURS PRN
Qty: 90 TABLET | Refills: 1 | Status: SHIPPED | OUTPATIENT
Start: 2021-05-04 | End: 2022-01-10

## 2021-05-04 RX ORDER — CYCLOBENZAPRINE HCL 10 MG
10 TABLET ORAL 3 TIMES DAILY PRN
Qty: 60 TABLET | Refills: 0 | Status: SHIPPED | OUTPATIENT
Start: 2021-05-04

## 2021-05-04 RX ORDER — ESCITALOPRAM OXALATE 20 MG/1
20 TABLET ORAL DAILY
Qty: 30 TABLET | Refills: 1 | Status: SHIPPED | OUTPATIENT
Start: 2021-05-05

## 2021-05-04 RX ORDER — GABAPENTIN 800 MG/1
800 TABLET ORAL 3 TIMES DAILY
Qty: 90 TABLET | Refills: 1 | Status: SHIPPED | OUTPATIENT
Start: 2021-05-04

## 2021-05-04 RX ORDER — PROPRANOLOL HYDROCHLORIDE 20 MG/1
20 TABLET ORAL 2 TIMES DAILY
Qty: 60 TABLET | Refills: 1 | Status: SHIPPED | OUTPATIENT
Start: 2021-05-04

## 2021-05-04 RX ORDER — TRAZODONE HYDROCHLORIDE 50 MG/1
50 TABLET, FILM COATED ORAL
Qty: 30 TABLET | Refills: 1 | Status: SHIPPED | OUTPATIENT
Start: 2021-05-04

## 2021-05-04 RX ADMIN — GABAPENTIN 800 MG: 100 CAPSULE ORAL at 13:51

## 2021-05-04 RX ADMIN — POLYETHYLENE GLYCOL 3350 17 G: 17 POWDER, FOR SOLUTION ORAL at 08:57

## 2021-05-04 RX ADMIN — TRAZODONE HYDROCHLORIDE 150 MG: 150 TABLET ORAL at 21:29

## 2021-05-04 RX ADMIN — PROPRANOLOL HYDROCHLORIDE 20 MG: 10 TABLET ORAL at 08:57

## 2021-05-04 RX ADMIN — ESCITALOPRAM OXALATE 20 MG: 10 TABLET ORAL at 08:57

## 2021-05-04 RX ADMIN — PROPRANOLOL HYDROCHLORIDE 20 MG: 10 TABLET ORAL at 21:29

## 2021-05-04 RX ADMIN — PRAZOSIN HYDROCHLORIDE 1 MG: 1 CAPSULE ORAL at 21:29

## 2021-05-04 RX ADMIN — GABAPENTIN 800 MG: 100 CAPSULE ORAL at 21:28

## 2021-05-04 RX ADMIN — GABAPENTIN 800 MG: 100 CAPSULE ORAL at 08:57

## 2021-05-04 RX ADMIN — MULTIPLE VITAMINS W/ MINERALS TAB 1 TABLET: TAB at 08:57

## 2021-05-04 RX ADMIN — Medication 50 MCG: at 08:57

## 2021-05-04 RX ADMIN — MELATONIN TAB 3 MG 3 MG: 3 TAB at 21:29

## 2021-05-04 RX ADMIN — ACETAMINOPHEN 650 MG: 325 TABLET, FILM COATED ORAL at 17:28

## 2021-05-04 ASSESSMENT — ACTIVITIES OF DAILY LIVING (ADL)
DRESS: INDEPENDENT
ORAL_HYGIENE: INDEPENDENT
HYGIENE/GROOMING: INDEPENDENT
LAUNDRY: WITH SUPERVISION

## 2021-05-04 ASSESSMENT — MIFFLIN-ST. JEOR: SCORE: 1486.05

## 2021-05-04 NOTE — PLAN OF CARE
Problem: Depressive Symptoms  Goal: Depressive Symptoms  Description: Signs and symptoms of listed problems will be absent or manageable.  Outcome: No Change  Flowsheets (Taken 5/4/2021 9636)  Depressive Symptoms Assessed: sleep  Note: Pt slept through shift with no concerns.     NOC Shift Report    Pt in bed at beginning of shift, breathing quiet and unlabored. Pt slept through majority of shift. Pt slept 6.75 hours.     No pt complaints or concerns at this time.     No PRNs given. Will continue to monitor.     Precautions: Suicide, Self-Injury

## 2021-05-04 NOTE — PLAN OF CARE
Assessment/Intervention/Current Symtoms and Care Coordination  YOUNG (writer) met with trx team, provided update, and reviewed pt's chart. CTC completed team note. CTC met with pt today to check in and discuss discharge planning. Pt confirmed that she still plans to return to day trx upon discharge and that she would like help setting up a psychiatry follow-up appointment. Pt declined having a therapy appointment set up for her, stating she would like to do that herself. Pt reported feeling better overall and thinks she will be ready to discharge tomorrow. Pt appeared much brighter today then yesterday upon observation and approach. CTC called day treatment, leaving a VM, informing them when pt would be discharging and that she should be able to return to her day trx group this Thursday. CTC requested a callback if there were any concerns and reported that they would reach out again tomorrow to confirm. AVS was started/updated.      Discharge Plan or Goal  Pt will likely discharge back to day treatment once stabilized.     Barriers to Discharge   Safe discharge plan, medication evaluation, and symptom presentation (ongoing depression and SI)     Referral Status  Day treatment ([facility]) was informed of discharge date and plan for pt to return this Thursday to her group. (5/4/21)     Legal Status  VOLUNTARY

## 2021-05-04 NOTE — PLAN OF CARE
Shift Summary  Psych  Pt visible in milieu. Isolative and withdrawn to self. Denies any suicidal ideation, homicidal ideation, Self injury behavior, hallucination or racing thought. No evidence of psychosis, paranoid, delusional thoughts or disorganized behavior. Insight and judgement to her illness is improving. Possible discharge tomorrow. Participating in group activity.   Prn: none  Physical  Pt alert and oriented x 3. Denies pain. Vital sings WNL (see flow sheet for details). Slept 6.75 hours last night. Good medication compliance is noted. Seems tolerating medications well. No side effect reported by pt or noted by this writer. Appetite adequate. No problem with bowel and bladder. C/o weird tingling sensation on both lower extremities which subsided in few minutes. Dr Kumar was notified.   Prn: none  Continue to monitor pt's status Q 15 minutes and stabilize the patient's symptoms with the use of medications and therapeutic programming.

## 2021-05-04 NOTE — PROGRESS NOTES
"[facility], [place]   Psychiatric Progress Note        Interim History:   The patient's care was discussed with the treatment team during the daily team meeting and/or staff's chart notes were reviewed. Staff report patient spent most of her evening in milieu reading her book last night. She accepted phone calls from her  and child earlier in the day that did not exacerbate her mental health symptoms. Mariusz has been compliant with all medications and is eating meals. She experienced palpitations last night and was evaluated by a house physician. EKG and troponin were negative for cardiac etiologies, and the palpitations resolved. Her prazosin dose was held at bedtime because of the episode of palpitations.     Met with patient in the common room in the presence of a PA student. Mariusz reported that she is \"doing well\" but had some increased anxiety this morning. She had nightmares again last night (prazosin was held, see discussion above) and feels this is what exacerbated her anxiety. Mariusz said she otherwise is doing well today and denies SI and thoughts of self-injurious behaviors. Patient stated that she does not feel safe to return home today, but she feels that she will be ready for discharge tomorrow. Mariusz had no other questions or concerns for us today.          Medications:       escitalopram  20 mg Oral Daily     gabapentin  800 mg Oral TID     lidocaine   Transdermal Q8H     melatonin  3 mg Oral At Bedtime     multivitamin w/minerals  1 tablet Oral Daily     polyethylene glycol  17 g Oral Daily     prazosin  1 mg Oral At Bedtime     propranolol  20 mg Oral BID     traZODone  150 mg Oral At Bedtime     Vitamin D3  50 mcg Oral Daily          Allergies:     Allergies   Allergen Reactions     Honeybees [Bees] Anaphylaxis     Sulfamethoxazole-Trimethoprim Nausea and Vomiting     Codeine      Sulfa Drugs      Amoxicillin Rash     Codeine Rash          Labs:     Recent Results " "(from the past 24 hour(s))   EKG 12-lead, complete    Collection Time: 05/03/21  9:12 PM   Result Value Ref Range    Interpretation ECG Click View Image link to view waveform and result    Troponin I    Collection Time: 05/03/21  9:46 PM   Result Value Ref Range    Troponin I ES <0.015 0.000 - 0.045 ug/L          Psychiatric Examination:     /79   Pulse 67   Temp 98.1  F (36.7  C) (Oral)   Resp 16   Ht 1.702 m (5' 7\")   Wt 75.3 kg (166 lb 1.6 oz)   SpO2 99%   BMI 26.01 kg/m    Weight is 166 lbs 1.6 oz  Body mass index is 26.01 kg/m .    Orthostatic Vitals       Most Recent      Sitting Orthostatic /80 05/04 0806    Sitting Orthostatic Pulse (bpm) 72 05/04 0806    Standing Orthostatic /76 05/04 0806    Standing Orthostatic Pulse (bpm) 78 05/04 0806         Appearance: awake, alert, adequately groomed, dressed in hospital scrubs and appeared as age stated  Attitude: cooperative  Eye Contact:  good  Mood: less anxious and depressed  Affect:  mood incongruent, intensity is flat, constricted mobility and restricted range  Speech:  clear, coherent  Psychomotor Behavior:  no evidence of tardive dyskinesia, dystonia, or tics  Throught Process:  linear  Associations:  no loose associations  Thought Content:  passive suicidal ideation present, thoughts of self-harm, which are remained the same, no auditory hallucinations present and no visual hallucinations present  Insight:  fair  Judgement:  limited  Oriented to:  time, person, and place  Attention Span and Concentration:  intact  Recent and Remote Memory:  intact    Clinical Global Impressions  First: 6/4 4/28/2021     Most recent:          Precautions:     Behavioral Orders   Procedures     Code 1 - Restrict to Unit     Discontinue 1:1 attendant for suicide risk     Order Specific Question:   I have performed an in person assessment of the patient     Answer:   Based on this assessment the patient no longer requires a one on one attendant at this " point in time.     Routine Programming     As clinically indicated     Self Injury Precaution     Status 15     Every 15 minutes.     Suicide precautions     Patients on Suicide Precautions should have a Combination Diet ordered that includes a Diet selection(s) AND a Behavioral Tray selection for Safe Tray - with utensils, or Safe Tray - NO utensils            DIagnoses:   1. Major depressive disorder, recurrent and severe without psychotic features  2. Posttraumatic stress disorder   3. Past diagnosis of panic disorder without agoraphobia         Plan:   Patient remains in the hospital voluntarily, and we will continue to provide support and structure to her. She states that she feels unsafe to be discharged today because of her suicidal ideation and thoughts of self-harm. No medication changes today. The plan will be to set her up with DBT and have her return to our day program when she is ready for discharge, which will likely be tomorrow (5/5/21).

## 2021-05-04 NOTE — PLAN OF CARE
BEHAVIORAL TEAM DISCUSSION    Participants: Dr. José HOPE, Nina Amato RN, Alin Purdy [facility]  Progress: Improving, pt reports feeling better overall and believes she will be ready to discharge tomorrow. Pt continues to expressed feeling depressed mood, but agrees that it is more manageable, especially when she avoids her triggers related to her /family.  Anticipated length of stay: 7 days or less.  Continued Stay Criteria/Rationale: Medication evaluation, and ongoing symptoms of depression endorsed.  Medical/Physical: None reported in team.  Precautions:   Behavioral Orders   Procedures    Code 1 - Restrict to Unit    Discontinue 1:1 attendant for suicide risk     Order Specific Question:   I have performed an in person assessment of the patient     Answer:   Based on this assessment the patient no longer requires a one on one attendant at this point in time.    Routine Programming     As clinically indicated    Self Injury Precaution    Status 15     Every 15 minutes.    Suicide precautions     Patients on Suicide Precautions should have a Combination Diet ordered that includes a Diet selection(s) AND a Behavioral Tray selection for Safe Tray - with utensils, or Safe Tray - NO utensils       Plan: Pt will discharge back to her mothers home, where she has been staying, and returning to day treatment once stabilized. Pt plans on her mother giving her a ride at time of discharge and is feeling progressively more ready to discharge this week. CTC will arrange follow-up appointments for outpatient psychiatry prior to discharge as well.   Rationale for change in precautions or plan: Ongoing stabilization and aftercare support efforts.

## 2021-05-05 VITALS
HEART RATE: 78 BPM | DIASTOLIC BLOOD PRESSURE: 81 MMHG | RESPIRATION RATE: 16 BRPM | SYSTOLIC BLOOD PRESSURE: 134 MMHG | HEIGHT: 67 IN | OXYGEN SATURATION: 99 % | TEMPERATURE: 97.8 F | BODY MASS INDEX: 26.07 KG/M2 | WEIGHT: 166.1 LBS

## 2021-05-05 PROCEDURE — G0177 OPPS/PHP; TRAIN & EDUC SERV: HCPCS

## 2021-05-05 PROCEDURE — 99239 HOSP IP/OBS DSCHRG MGMT >30: CPT | Performed by: PSYCHIATRY & NEUROLOGY

## 2021-05-05 PROCEDURE — 250N000013 HC RX MED GY IP 250 OP 250 PS 637: Performed by: PSYCHIATRY & NEUROLOGY

## 2021-05-05 RX ADMIN — PROPRANOLOL HYDROCHLORIDE 20 MG: 10 TABLET ORAL at 08:51

## 2021-05-05 RX ADMIN — GABAPENTIN 800 MG: 100 CAPSULE ORAL at 13:04

## 2021-05-05 RX ADMIN — POLYETHYLENE GLYCOL 3350 17 G: 17 POWDER, FOR SOLUTION ORAL at 08:50

## 2021-05-05 RX ADMIN — GABAPENTIN 800 MG: 100 CAPSULE ORAL at 08:50

## 2021-05-05 RX ADMIN — Medication 50 MCG: at 08:50

## 2021-05-05 RX ADMIN — ACETAMINOPHEN 650 MG: 325 TABLET, FILM COATED ORAL at 12:24

## 2021-05-05 RX ADMIN — ESCITALOPRAM OXALATE 20 MG: 10 TABLET ORAL at 08:51

## 2021-05-05 RX ADMIN — MULTIPLE VITAMINS W/ MINERALS TAB 1 TABLET: TAB at 08:50

## 2021-05-05 NOTE — DISCHARGE INSTRUCTIONS
Behavioral Discharge Planning and Instructions    Summary: You were admitted on 4/26/2021  due to Depression, Anxiety and Suicidal Ideations.  You were treated by Dr. José HOPE, and discharged on 05/05/2021 from [facility] to Home    Main Diagnosis:   1. Major depressive disorder, recurrent and severe without psychotic features  2. Posttraumatic stress disorder   3. Past diagnosis of panic disorder without agoraphobia    Health Care Follow-up:   Psychiatry/Med-Management Appointment  Date/Time: 05/12/2021 at 8:40am, this is a phone visit.  Provider: George Hernandes  Address: [address] [place] [address] [place], [place] [address]  Phone: [phone]  Fax: [phone]    Day Treatment Return, [facility]  Located in [facility] at [facility]   Date/Time: 05/06/21  Address: [address] [place] [address]    Phone: [phone]  This is a remote program at this time. Please call them if you have any questions or concerns.    Attend all scheduled appointments with your outpatient providers. Call at least 24 hours in advance if you need to reschedule an appointment to ensure continued access to your outpatient providers.     Major Treatments, Procedures and Findings:  You were provided with: a psychiatric assessment, assessed for medical stability, medication evaluation and/or management, group therapy and milieu management    Symptoms to Report: feeling more aggressive, increased confusion, losing more sleep, mood getting worse or thoughts of suicide    Early warning signs can include: increased depression or anxiety sleep disturbances increased thoughts or behaviors of suicide or self-harm     Safety and Wellness:  Take all medicines as directed.  Make no changes unless your doctor suggests them.      Follow treatment recommendations.  Refrain from alcohol and non-prescribed drugs.  If there is a concern for safety, call 161.    Resources:   Crisis Intervention: 531.979.8530 or 705-091-0949 (TTY: 490.590.9379).  Call  "anytime for help.  National [place] on [place]ness (www.[place].hortencia.org): 834.612.8986 or 233-363-1852.  Alcoholics Anonymous (www.alcoholics-anonymous.org): Check your phone book for your local chapter.  Suicide Awareness Voices of Education (SAVE) (www.save.org): 893-670-WAOB (3438)  National Suicide Prevention Line (www.mentalhealthmn.org): 397-761-JDZB (6692)  Mental Health Consumer/Survivor Network of [place] (www.mhcsn.net): 464.689.3200 or 444-557-5793  Mental Health Association of [place] (www.mentalhealth.org): 342.600.5583 or 995-913-7182  Self- Management and Recovery Training., SMART-- Toll free: 823.905.3255  www.[employer]  [facility] Crisis (COPE) Response - Adult 740 260-0003  Text 4 Life: txt \"LIFE\" to 52431 for immediate support and crisis intervention    General Medication Instructions:   See your medication sheet(s) for instructions.   Take all medicines as directed.  Make no changes unless your doctor suggests them.   Go to all your doctor visits.  Be sure to have all your required lab tests. This way, your medicines can be refilled on time.  Do not use any drugs not prescribed by your doctor.  Avoid alcohol.    Advance Directives:   Scanned document on file with [facility]? No scanned doc  Is document scanned? Pt states no documents  Honoring Choices Your Rights Handout: Informed and given  Was more information offered? Pt declined    The Treatment team has appreciated the opportunity to work with you. If you have any questions or concerns about your recent admission, you can contact the unit which can receive your call 24 hours a day, 7 days a week. They will be able to get in touch with a Provider if needed. The unit number is [phone] .        "

## 2021-05-05 NOTE — PLAN OF CARE
Problem: Depressive Symptoms  Goal: Depressive Symptoms  Description: Signs and symptoms of listed problems will be absent or manageable.  Outcome: No Change  Flowsheets (Taken 5/5/2021 2462)  Depressive Symptoms Assessed: sleep  Note: Pt slept through shift with no concerns.     NOC Shift Report    Pt in bed at beginning of shift, breathing quiet and unlabored. Pt slept through shift. Pt slept 7 hours.     No pt complaints or concerns at this time.     No PRNs given. Will continue to monitor.     Precautions: Suicide, Self-Injury

## 2021-05-05 NOTE — PLAN OF CARE
"  Problem: Adult Inpatient Plan of Care  Goal: Plan of Care Review  Outcome: Improving    Pt spent most of the shift reading a book in her room. Reported anxiety of 7 on a 10 scale but did not want any medication. Her affect was blunted but she brightened upon approach. Pt denied SI/SIB. Reported sleep and appetite as \"good.\" No medication side effects reported or noted.   Plan: Status 15s; Build trust with pt. Continue to build on strengths. Encourage compliance and healthy coping.          "

## 2021-05-05 NOTE — PLAN OF CARE
48 Hours Nursing Assessment/Discharge note  Shift Summary: Pt alert and oriented x 3. Presents polite, cooperative and calm. Able to communicate needs. Speech is clear and coherent  Affect is and mood is . Poor concentrations. Insight and judgement are intake.  Hopeful for future. Visible in milieu and social with other patient. Appetite adequate. Pt is medication compliance. Slept 7.0 hours last night.     Discharged today. All discharge medications and instructions were reviewed with pt. Copy of discharge instruction and unit address/phone number given to pt. Discharge medications were given to pt to take home. Walking, escorted down stairs and transferred to car safely.   At the time of discharge, pt denied any SI, SIB, HI, hallucination, racing thought, suicidal or homicidal ideations. No evidence of psychosis or paranoid/delusional thoughts. Endorses both depression and anxiety. Rated depression 2/10 & anxiety 2/10.   Discharge medication: Given to pt from discharge pharmacy  Discharge palace: pt's mother  Transportation: mother's car  Outcome: progressing well   New Medications Today: none  Prn Medication given/Reason/effectiveness: none  Pain: denies   Sensitivity/side effect: tolerating medications well. No side effect reported by pt or noted by this writer.  Valuable: none

## 2021-05-05 NOTE — PLAN OF CARE
Assessment/Intervention/Current Symtoms and Care Coordination  YOUNG (writer) met with trx team, provided update, and reviewed pt's chart.  CTC called and confirmed with day treatment that they got the message that pt was discharging today, and reported that pt was schedule to restart day treatment tomorrow at 9:00am. CTC met with pt to check in and review discharge plan, including that she was expected back at day treatment starting tomorrow. Pt reported feeling ready for discharge and thanked [facility] for their help. [facility] provided RN a ELIAZAR packet to give pt at time of discharge. AVS was finalized.      Discharge Plan or Goal  Pt will discharge back to day treatment once stabilized with outpatient follow-up with psychiatry.      Barriers to Discharge   None     Referral Status  Day treatment ([facility]) was informed of discharge date and plan for pt to return this Thursday to her group. (5/4/21)     Legal Status  VOLUNTARY
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
## 2021-05-10 ENCOUNTER — HOSPITAL ENCOUNTER (OUTPATIENT)
Dept: BEHAVIORAL HEALTH | Facility: CLINIC | Age: 36
End: 2021-05-10
Attending: PSYCHIATRY & NEUROLOGY
Payer: COMMERCIAL

## 2021-05-10 PROCEDURE — 90853 GROUP PSYCHOTHERAPY: CPT | Mod: GT

## 2021-05-10 PROCEDURE — 90853 GROUP PSYCHOTHERAPY: CPT | Mod: GT | Performed by: PSYCHOLOGIST

## 2021-05-10 PROCEDURE — 90853 GROUP PSYCHOTHERAPY: CPT | Mod: GT | Performed by: COUNSELOR

## 2021-05-10 NOTE — GROUP NOTE
Process Group Note    PATIENT'S NAME: Mariusz Huntley  MRN:   [id number]  :   1985  ACCT. NUMBER: [id number]  DATE OF SERVICE: 5/10/21  START TIME: 10:00 AM  END TIME: 10:50 AM  FACILITATOR: Eric Alejandre LP  TOPIC:  Process Group    Diagnoses:  296.32 Major Depressive Disorder, Moderate  309.81 PTSD  R/O Panic Disorder    [facility] Day Treatment  TRACK: 2A    NUMBER OF PARTICIPANTS: 5    Telemedicine Visit: The patient's condition can be safely assessed and treated via synchronous audio and visual telemedicine encounter.      Reason for Telemedicine Visit: Services only offered telehealth    Originating Site (Patient Location): Patient's home    Distant Site (Provider Location): Provider Remote Setting    Consent:  The patient/guardian has verbally consented to: the potential risks and benefits of telemedicine (video visit) versus in person care; bill my insurance or make self-payment for services provided; and responsibility for payment of non-covered services.     Mode of Communication:  Video Conference via Mobile Messenger    As the provider I attest to compliance with applicable laws and regulations related to telemedicine.           Data:    Session content: At the start of this group, patients were invited to check in by identifying themselves, describing their current emotional status, and identifying issues to address in this group.   Area(s) of treatment focus addressed in this session included Symptom Management, Personal Safety, Community Resources/Discharge Planning and Develop / Improve Independent Living Skills.     Pt reports she is in a good mood. She reports she plans to get her daughter a softball glove and play catch which she is looking forward to doing. . Pt reports she is feeling a lot better since being inpatient.     Therapeutic Interventions/Treatment Strategies:  Psychotherapist offered support, feedback and  validation.    Assessment:    Patient response:   Patient responded to session by accepting feedback, giving feedback, listening, focusing on goals, being attentive, accepting support and appearing alert    Possible barriers to participation / learning include: and no barriers identified    Health Issues:   None reported       Substance Use Review:   Substance Use: No active concerns identified.    Mental Status/Behavioral Observations  Appearance:   Appropriate   Eye Contact:   Good   Psychomotor Behavior: Normal   Attitude:   Cooperative   Orientation:   All  Speech   Rate / Production: Normal    Volume:  Normal   Mood:    Normal  Affect:    Appropriate   Thought Content:   Clear  Thought Form:  Coherent  Logical     Insight:    Good     Plan:     Safety Plan: Recommended that patient call 911 or go to the local ED should there be a change in any of these risk factors.     Barriers to treatment: None identified    Patient Contracts (see media tab):  None    Substance Use: Not addressed in session     Continue or Discharge: Patient will continue in Adult Day Treatment (ADT)  as planned. Patient is likely to benefit from learning and using skills as they work toward the goals identified in their treatment plan.      Eric Alejandre LP  May 10, 2021

## 2021-05-10 NOTE — GROUP NOTE
Psychotherapy Group Note    PATIENT'S NAME: Mariusz Huntley  MRN:   [id number]  :   1985  ACCT. NUMBER: [id number]  DATE OF SERVICE: 5/10/21  START TIME:  9:00 AM  END TIME:  9:50 AM  FACILITATOR: Natalie Lugo LICSW  TOPIC: MH EBP Group: Emotions Management  [facility] Adult Mental Health Day Treatment  TRACK: 2A    Telemedicine Visit: The patient's condition can be safely assessed and treated via synchronous audio and visual telemedicine encounter.      Reason for Telemedicine Visit: Services only offered telehealth    Originating Site (Patient Location): Patient's home    Distant Site (Provider Location): [facility] Hospital: [facility]    Consent:  The patient/guardian has verbally consented to: the potential risks and benefits of telemedicine (video visit) versus in person care; bill my insurance or make self-payment for services provided; and responsibility for payment of non-covered services.     Mode of Communication:  Video Conference via zoom    As the provider I attest to compliance with applicable laws and regulations related to telemedicine.      NUMBER OF PARTICIPANTS: 7    Summary of Group / Topics Discussed:  Emotions Management: Mood Tracking: Patients discussed and reviewed different resources to track one s mood, with a goal of identifying patterns and correlations between different factors and mood state.  Patients discussed ways to increase awareness of one s mood and how it may be impacted by environmental factors, diet, activity level, medication, etc. The group shared their experiences and thought processes for feedback.      Patient Session Goals / Objectives:    Increase awareness of daily mood patterns/changes    Report out identified factors that impact their mood    Demonstrate understanding of how to use different resources to track mood, and effectively use these to help manage symptoms      Patient Participation / Response:  Fully participated with the  group by sharing personal reflections / insights and openly received / provided feedback with other participants.    Demonstrated understanding of topics discussed through group discussion and participation    Treatment Plan:  Patient has a current master individualized treatment plan.  See Epic treatment plan for more information.    Natalie Magallon, LICSW

## 2021-05-10 NOTE — GROUP NOTE
Psychoeducation Group Note    PATIENT'S NAME: Mariusz Huntley  MRN:   [id number]  :   1985  ACCT. NUMBER: [id number]  DATE OF SERVICE: 5/10/21  START TIME: 11:00 AM  END TIME: 11:50 AM  FACILITATOR: Zuri Armando LPCC  TOPIC: MARIA ESTHER RN Group: Mental Health Maintenance  [facility] Adult Mental Health Day Treatment  TRACK: 2A    NUMBER OF PARTICIPANTS: 6    Summary of Group / Topics Discussed:  Mental Health Maintenance:  Stigma: In this group patients explored stigma surrounding a mental health diagnosis.  The group discussed the way stigma impacts their own life, and discussed strategies to reduce. The relationship between physical and mental health were also explored in the context of healthcare access, treatment, and support.    Patient Session Goals / Objectives:  ? Patients identified the importance of practicing emotional hygiene  ? Patients identified ways to decrease the  impact of stigma in their own life    Telemedicine Visit: The patient's condition can be safely assessed and treated via synchronous audio and visual telemedicine encounter.      Reason for Telemedicine Visit: Services only offered telehealth and due to COVID-19    Originating Site (Patient Location): Patient's home    Distant Site (Provider Location): Provider Remote Setting    Consent:  The patient/guardian has verbally consented to: the potential risks and benefits of telemedicine (video visit) versus in person care; bill my insurance or make self-payment for services provided; and responsibility for payment of non-covered services.     Mode of Communication:  Video Conference via Andigilog    As the provider I attest to compliance with applicable laws and regulations related to telemedicine.        Patient Participation / Response:  Fully participated with the group by sharing personal reflections / insights and openly received / provided feedback with other participants.    Demonstrated understanding of topics  discussed through group discussion and participation, Identified / Expressed personal readiness to practice skills and Verbalized understanding of mental health maintenance topic    Treatment Plan:  Patient has a current master individualized treatment plan.  See Epic treatment plan for more information.    Zuri Armando, LPCC

## 2021-05-11 ENCOUNTER — HOSPITAL ENCOUNTER (OUTPATIENT)
Dept: BEHAVIORAL HEALTH | Facility: CLINIC | Age: 36
End: 2021-05-11
Attending: PSYCHIATRY & NEUROLOGY
Payer: COMMERCIAL

## 2021-05-11 DIAGNOSIS — F43.10 PTSD (POST-TRAUMATIC STRESS DISORDER): ICD-10-CM

## 2021-05-11 DIAGNOSIS — F33.1 MAJOR DEPRESSIVE DISORDER, RECURRENT EPISODE, MODERATE (H): ICD-10-CM

## 2021-05-11 PROCEDURE — 90853 GROUP PSYCHOTHERAPY: CPT | Mod: GT | Performed by: PSYCHOLOGIST

## 2021-05-11 PROCEDURE — 90853 GROUP PSYCHOTHERAPY: CPT | Mod: GT | Performed by: SOCIAL WORKER

## 2021-05-11 NOTE — GROUP NOTE
Psychotherapy Group Note    PATIENT'S NAME: Mariusz Huntley  MRN:   [id number]  :   1985  ACCT. NUMBER: [id number]  DATE OF SERVICE: 21  START TIME: 11:00 AM  END TIME: 11:50 AM  FACILITATOR: Karine Negrete LICSW  TOPIC: MH EBP Group: Enhanced Mindfulness  [facility] Adult Mental Health Day Treatment  TRACK: 2A    NUMBER OF PARTICIPANTS: 4    Summary of Group / Topics Discussed:  Enhanced Mindfulness: Body and Mind Integration: Patients received an overview and education regarding the importance of including the body in the management of emotional health and self-care and as a direct route to mindfulness practice.  Patients discussed various ways in which the body can serve as an informant to their physical and emotional experiences/need. Patients discussed the body as a direct link to the present moment and to mindfulness practice.  Patients discussed current relationship with body, self-awareness, mindfulness practice and barriers to connection with body.  Patients were guided through breath work and movement to facilitate greater self-awareness, grounding, self-expression, and connection to other.  Patients discussed how the experiential could be applied to better manage mental health and develop joe connection to self.    Patient Session Goals / Objectives:    Identify how movement awareness could be used for grounding, stress management, self-expression, connection to other and self-regulation    Improved awareness of breath and movement preferences    Identify how movement and the body is used in mindfulness practice    Reflect on use of these practices in everyday life.    Identify barriers to attending to body  Telemedicine Visit: The patient's condition can be safely assessed and treated via synchronous audio and visual telemedicine encounter.      Reason for Telemedicine Visit: Services only offered telehealth    Originating Site (Patient Location): Patient's  home    Distant Site (Provider Location): Provider Remote Setting    Consent:  The patient/guardian has verbally consented to: the potential risks and benefits of telemedicine (video visit) versus in person care; bill my insurance or make self-payment for services provided; and responsibility for payment of non-covered services.     Mode of Communication:  Video Conference via Getix    As the provider I attest to compliance with applicable laws and regulations related to telemedicine.       Patient Participation / Response:  Fully participated with the group by sharing personal reflections / insights and openly received / provided feedback with other participants.    Identified / Expressed readiness to act on skill suggestions discussed in topic    Treatment Plan:  Patient has a current master individualized treatment plan.  See Epic treatment plan for more information.    Karine Negrete, ESMESW

## 2021-05-11 NOTE — GROUP NOTE
"Process Group Note  Telemedicine Visit: The patient's condition can be safely assessed and treated via synchronous audio and visual telemedicine encounter.    Reason for Telemedicine Visit: Patient has requested telehealth visit  Originating Site (Patient Location): Patient's home  Distant Site (Provider Location): [facility] Outpatient Setting: Adult Day Tx  Consent:  The patient/guardian has verbally consented to: the potential risks and benefits of telemedicine (video visit) versus in person care; bill my insurance or make self-payment for services provided; and responsibility for payment of non-covered services.   Mode of Communication:  Video Conference via Sabesim  As the provider I attest to compliance with applicable laws and regulations related to telemedicine.    PATIENT'S NAME: Mariusz Huntley  MRN:   [id number]  :   1985  ACCT. NUMBER: [id number]  DATE OF SERVICE: 21  START TIME:  9:00 AM  END TIME:  9:50 AM  FACILITATOR: Milagros Meza PsyD  TOPIC:  Process Group    Diagnoses:  296.32 Major Depressive Disorder, Moderate  309.81 PTSD  R/O Panic Disorder    Psychiatry: [facility] [place]  Therapy: Dr. Casper, [facility]  PCP:  ......      [facility] Adult Mental Health Day Treatment  TRACK: 2A    NUMBER OF PARTICIPANTS: 5          Data:    Session content: At the start of this group, patients were invited to check in by identifying themselves, describing their current emotional status, and identifying issues to address in this group.   Area(s) of treatment focus addressed in this session included Symptom Management, Personal Safety and Community Resources/Discharge Planning.  Client reported being safe today.  Reported mood is \"good.\"    Goal for today is to attend therapy groups.  The client talked to the group about how she was inpatient and they took her off the Depakote, and her depression and the \"dark thoughts\" are much better.  " She reported chaos on the unit with patients screaming and a code called, and a lock down was in place, and that it triggered nightmares and flashbacks for her.  She reported sleeping better, now that she is at home.       Therapeutic Interventions/Treatment Strategies:  Psychotherapist offered support, feedback and validation and reinforced use of skills. Treatment modalities used include Cognitive Behavioral Therapy and Dialectical Behavioral Therapy. Interventions include Coping Skills: Promoted understanding of how and when to apply grounding strategies to reduce distress and increase presence in the moment, Relapse Prevention: Coached on skills to manage factors that contribute to relapse and Mindfulness: Encouraged a plan to use mindfulness skills in daily life.    Assessment:    Patient response:   Patient responded to session by listening, focusing on goals and accepting support    Possible barriers to participation / learning include: severity of symptoms    Health Issues:   None reported       Substance Use Review:   Substance Use: No active concerns identified.    Mental Status/Behavioral Observations  Appearance:   Appropriate   Eye Contact:   Good   Psychomotor Behavior: Normal   Attitude:   Cooperative   Orientation:   All  Speech   Rate / Production: Normal    Volume:  Normal   Mood:    Anxious   Affect:    Constricted   Thought Content:   Rumination  Thought Form:  Coherent  Logical     Insight:    Good     Plan:     Safety Plan: Recommended that patient call 911 or go to the local ED should there be a change in any of these risk factors.     Barriers to treatment: None identified    Patient Contracts (see media tab):  None    Substance Use: Provided encouragement towards sobriety     Continue or Discharge: Patient will continue in Adult Day Treatment (ADT)  as planned. Patient is likely to benefit from learning and using skills as they work toward the goals identified in their treatment  plan.      Milagros Meza PsyD  May 11, 2021  Sheryl Head, FEDE,  Licensed Clinical Psychologist

## 2021-05-11 NOTE — GROUP NOTE
Psychotherapy Group Note  Telemedicine Visit: The patient's condition can be safely assessed and treated via synchronous audio and visual telemedicine encounter.    Reason for Telemedicine Visit: Patient has requested telehealth visit  Originating Site (Patient Location): Patient's home  Distant Site (Provider Location): [facility] Outpatient Setting: Adult Day Tx  Consent:  The patient/guardian has verbally consented to: the potential risks and benefits of telemedicine (video visit) versus in person care; bill my insurance or make self-payment for services provided; and responsibility for payment of non-covered services.   Mode of Communication:  Video Conference via MENA PRESTIGE  As the provider I attest to compliance with applicable laws and regulations related to telemedicine.    PATIENT'S NAME: Mariusz Huntley  MRN:   [id number]  :   1985  ACCT. NUMBER: [id number]  DATE OF SERVICE: 21  START TIME: 10:00 AM  END TIME: 10:50 AM  FACILITATOR: Milagros Meza PsyD  TOPIC: MH EBP Group: Self-Awareness  [facility] Adult Mental Health Day Treatment  TRACK: 2A    NUMBER OF PARTICIPANTS: 5    Summary of Group / Topics Discussed:  Self-Awareness: Self-Compassion: Patients received overview of key concepts in developing self-compassion. Patients discussed mindfulness, self-kindness, and finding common humanity. Patients identified their current approach to problems in their lives and learned skills for increasing self-compassion. Patients identified ways they can put self-compassion skills into practice and problem solve barriers to application of skills.     Patient Session Goals / Objectives:    [place] components of self-compassion    Identify ways to practice self-compassion in daily life    Problem solve barriers to self-compassion practice      Patient Participation / Response:  Fully participated with the group by sharing personal reflections / insights and openly received / provided  feedback with other participants.    Verbalized understanding of ways to proactively manage illness    Treatment Plan:  Patient has a current master individualized treatment plan.  See Epic treatment plan for more information.    Analia Sousa Psy., D,  Licensed Clinical Psychologist

## 2021-05-13 ENCOUNTER — HOSPITAL ENCOUNTER (OUTPATIENT)
Dept: BEHAVIORAL HEALTH | Facility: CLINIC | Age: 36
End: 2021-05-13
Attending: PSYCHIATRY & NEUROLOGY
Payer: COMMERCIAL

## 2021-05-13 DIAGNOSIS — F33.1 MAJOR DEPRESSIVE DISORDER, RECURRENT EPISODE, MODERATE (H): ICD-10-CM

## 2021-05-13 DIAGNOSIS — F43.10 PTSD (POST-TRAUMATIC STRESS DISORDER): ICD-10-CM

## 2021-05-13 PROCEDURE — 90853 GROUP PSYCHOTHERAPY: CPT | Mod: GT | Performed by: SOCIAL WORKER

## 2021-05-13 PROCEDURE — 90853 GROUP PSYCHOTHERAPY: CPT | Mod: GT | Performed by: PSYCHOLOGIST

## 2021-05-13 NOTE — GROUP NOTE
Psychotherapy Group Note  Telemedicine Visit: The patient's condition can be safely assessed and treated via synchronous audio and visual telemedicine encounter.    Reason for Telemedicine Visit: Patient has requested telehealth visit  Originating Site (Patient Location): Patient's home  Distant Site (Provider Location): [facility] Outpatient Setting: Adult Day Tx  Consent:  The patient/guardian has verbally consented to: the potential risks and benefits of telemedicine (video visit) versus in person care; bill my insurance or make self-payment for services provided; and responsibility for payment of non-covered services.   Mode of Communication:  Video Conference via Polatis  As the provider I attest to compliance with applicable laws and regulations related to telemedicine.    PATIENT'S NAME: Mariusz Huntley  MRN:   [id number]  :   1985  ACCT. NUMBER: [id number]  DATE OF SERVICE: 21  START TIME: 10:00 AM  END TIME: 10:50 AM  FACILITATOR: Milagros Meza PsyD  TOPIC:  EBP Group: Coping Skills  [facility] Adult Mental Health Day Treatment  TRACK: 2A    NUMBER OF PARTICIPANTS: 4    Summary of Group / Topics Discussed:  Coping Skills: Relapse Planning: Patients discussed and increased understanding of how anticipating and planning for possible increased symptoms is a proactive way to reduce the likelihood of relapse.  Patients shared individualized factors that may lead to increased symptoms and decompensation in functioning.  Each patient developed a relapse prevention plan designed to help them recognize when they may have increasing symptoms requiring them to take action and notify their key supports and care team.      Patient Session Goals / Objectives:    Identify and understand what factors may contribute to symptom relapse.    Identify actions that may be taken to manage increased symptoms/stressors.    Create an individualized written relapse plan    Problem solve  barriers to plan creation and implementation    Share relapse plan with key support people        Patient Participation / Response:  Fully participated with the group by sharing personal reflections / insights and openly received / provided feedback with other participants.    Demonstrated knowledge of when to consider using a variety of coping skills in daily life    Treatment Plan:  Patient has a current master individualized treatment plan.  See Epic treatment plan for more information.    Analia Sousa Psy., D,  Licensed Clinical Psychologist

## 2021-05-13 NOTE — GROUP NOTE
Psychotherapy Group Note    PATIENT'S NAME: Mariusz Huntley  MRN:   [id number]  :   1985  ACCT. NUMBER: [id number]  DATE OF SERVICE: 21  START TIME: 11:00 AM  END TIME: 11:50 AM  FACILITATOR: Karine Negrete LICSW  TOPIC: MH EBP Group: Specialty Awareness  [facility] Adult Mental Health Day Treatment  TRACK: 2A    NUMBER OF PARTICIPANTS: 3    Summary of Group / Topics Discussed:  Specialty Topics: Life Transitions: The topic of life transitions was presented in order to help patients to better understand the challenges presented by life transitions, and how to best navigate them. Exploring the phases of transition and how one works through them was discussed. Patients were provided with information regarding community resources.     Patient Session Goals / Objectives:    Discussed the timing and nature of major life transitions    Explored how life transitions may impact mental health and functioning    Discussed coping strategies to manage symptoms and help with transitioning    Discussed and planned a successful transition  Telemedicine Visit: The patient's condition can be safely assessed and treated via synchronous audio and visual telemedicine encounter.      Reason for Telemedicine Visit: Services only offered telehealth    Originating Site (Patient Location): Patient's home    Distant Site (Provider Location): Provider Remote Setting    Consent:  The patient/guardian has verbally consented to: the potential risks and benefits of telemedicine (video visit) versus in person care; bill my insurance or make self-payment for services provided; and responsibility for payment of non-covered services.     Mode of Communication:  Video Conference via UsTrendy    As the provider I attest to compliance with applicable laws and regulations related to telemedicine.       Patient Participation / Response:  Fully participated with the group by sharing personal reflections / insights  and openly received / provided feedback with other participants.    Verbalized understanding of ways to proactively manage illness    Treatment Plan:  Patient has a current master individualized treatment plan.  See Epic treatment plan for more information.    Karine Negrete, ESMESW

## 2021-05-13 NOTE — GROUP NOTE
Process Group Note  Telemedicine Visit: The patient's condition can be safely assessed and treated via synchronous audio and visual telemedicine encounter.    Reason for Telemedicine Visit: Patient has requested telehealth visit  Originating Site (Patient Location): Patient's home  Distant Site (Provider Location): [facility] Outpatient Setting: Adult Day Tx  Consent:  The patient/guardian has verbally consented to: the potential risks and benefits of telemedicine (video visit) versus in person care; bill my insurance or make self-payment for services provided; and responsibility for payment of non-covered services.   Mode of Communication:  Video Conference via Visualnet  As the provider I attest to compliance with applicable laws and regulations related to telemedicine.    PATIENT'S NAME: Mariusz Huntley  MRN:   [id number]  :   1985  ACCT. NUMBER: [id number]  DATE OF SERVICE: 21  START TIME:  9:00 AM  END TIME:  9:50 AM  FACILITATOR: Milagros Meza PsyD  TOPIC:  Process Group    Diagnoses:  296.32 Major Depressive Disorder, Moderate  309.81 PTSD  R/O Panic Disorder    Psychiatry: [facility] [place]  Therapy: Dr. Casper, [facility]  PCP:  ......      [facility] Adult Mental Health Day Treatment  TRACK: 2A    NUMBER OF PARTICIPANTS: 4          Data:    Session content: At the start of this group, patients were invited to check in by identifying themselves, describing their current emotional status, and identifying issues to address in this group.   Area(s) of treatment focus addressed in this session included Symptom Management, Personal Safety and Community Resources/Discharge Planning.  Client reported being safe today.  Reported mood is better.     Goal for today is to attend therapy groups. The client talked to the group about how she feels better after being off the Depakote. She reported that she took her kids to the park and that they plan  to do more hiking, biking, and she will teach her daughter to roller skate.   She reported some negative, depressive thoughts, and some suicidal ideation, but no plan or intent to harm herself.   She reported a nightmare and a flashback about the police raiding her home, and said that they have decreased. She continues to see her individual therapist, too.      Therapeutic Interventions/Treatment Strategies:  Psychotherapist reinforced use of skills. Treatment modalities used include Cognitive Behavioral Therapy and Dialectical Behavioral Therapy. Interventions include Coping Skills: Promoted understanding of how and when to apply grounding strategies to reduce distress and increase presence in the moment, Relapse Prevention: Coached on skills to manage factors that contribute to relapse and Mindfulness: Facilitated discussion of when/how to use mindfulness skills to benefit general health, mental health symptoms, and stressors.    Assessment:    Patient response:   Patient responded to session by listening, focusing on goals and appearing alert    Possible barriers to participation / learning include: severity of symptoms    Health Issues:   None reported       Substance Use Review:   Substance Use: No active concerns identified.    Mental Status/Behavioral Observations  Appearance:   Appropriate   Eye Contact:   Good   Psychomotor Behavior: Normal   Attitude:   Cooperative   Orientation:   All  Speech   Rate / Production: Normal    Volume:  Normal   Mood:    Anxious   Affect:    Constricted   Thought Content:   Rumination  Thought Form:  Coherent  Logical     Insight:    Good     Plan:     Safety Plan: Recommended that patient call 911 or go to the local ED should there be a change in any of these risk factors.     Barriers to treatment: None identified    Patient Contracts (see media tab):  None    Substance Use: Provided encouragement towards sobriety     Continue or Discharge: Patient will continue in Adult Day  Treatment (ADT)  as planned. Patient is likely to benefit from learning and using skills as they work toward the goals identified in their treatment plan.      Milagros Meza PsyD  May 13, 2021

## 2021-05-17 ENCOUNTER — HOSPITAL ENCOUNTER (OUTPATIENT)
Dept: BEHAVIORAL HEALTH | Facility: CLINIC | Age: 36
End: 2021-05-17
Attending: PSYCHIATRY & NEUROLOGY
Payer: COMMERCIAL

## 2021-05-17 DIAGNOSIS — F43.10 PTSD (POST-TRAUMATIC STRESS DISORDER): ICD-10-CM

## 2021-05-17 DIAGNOSIS — F33.1 MAJOR DEPRESSIVE DISORDER, RECURRENT EPISODE, MODERATE (H): ICD-10-CM

## 2021-05-17 PROCEDURE — 90853 GROUP PSYCHOTHERAPY: CPT | Mod: GT | Performed by: SOCIAL WORKER

## 2021-05-17 PROCEDURE — 90853 GROUP PSYCHOTHERAPY: CPT | Mod: GT | Performed by: PSYCHOLOGIST

## 2021-05-17 NOTE — ADDENDUM NOTE
Encounter addended by: Milagros Meza PsyD on: 5/17/2021 3:28 PM   Actions taken: Clinical Note Signed

## 2021-05-17 NOTE — PROGRESS NOTES
Acknowledgement of Current Treatment Plan       I have reviewed my treatment plan with my therapist / counselor on 5/17/2021  .   I agree with the plan as it is written in the electronic health record. (2A)    Name:      Signature:  Mariusz Huntley   Unable to sign due to COVID19     Dr Edwardo Urena MD  Psychiatrist    Selene Head.FEDE,  Licensed Psychologist   Sheryl Head, D,  Licensed Clinical Psychologist

## 2021-05-17 NOTE — GROUP NOTE
Psychotherapy Group Note    PATIENT'S NAME: Mariusz Huntley  MRN:   [id number]  :   1985  ACCT. NUMBER: [id number]  DATE OF SERVICE: 21  START TIME: 11:00 AM  END TIME: 11:50 AM  FACILITATOR: Karine Negrete LICSW  TOPIC: MH EBP Group: Relationship Skills  [facility] Adult Mental Health Day Treatment  TRACK: 2A    NUMBER OF PARTICIPANTS: 6    Summary of Group / Topics Discussed:  Relationship Skills: Dependencies: Patients were provided with a general overview of different types of dependencies and how they relate to symptoms and functioning. The purpose is to help patients identify the different types of dependencies, how they may be impacted by them, and to gain awareness and skills to work towards healthier relationships.    Patient Session Goals / Objectives:    Familiarized patients with the concept of interpersonal relationships and their characteristics.      Discussed and practiced strategies to promote healthier understanding of interpersonal relationships with a focus on dependencies    Identified types of dependencies in patient s lives and how they impact their symptoms and functioning  Telemedicine Visit: The patient's condition can be safely assessed and treated via synchronous audio and visual telemedicine encounter.      Reason for Telemedicine Visit: Services only offered telehealth    Originating Site (Patient Location): Patient's home    Distant Site (Provider Location): Provider Remote Setting    Consent:  The patient/guardian has verbally consented to: the potential risks and benefits of telemedicine (video visit) versus in person care; bill my insurance or make self-payment for services provided; and responsibility for payment of non-covered services.     Mode of Communication:  Video Conference via Amen.    As the provider I attest to compliance with applicable laws and regulations related to telemedicine.       Patient Participation / Response:  Fully  participated with the group by sharing personal reflections / insights and openly received / provided feedback with other participants.    Demonstrated understanding of relationship skills and communication skills    Treatment Plan:  Patient has a current master individualized treatment plan.  See Epic treatment plan for more information.    Karine Negrete, [facility]

## 2021-05-17 NOTE — GROUP NOTE
Process Group Note  Telemedicine Visit: The patient's condition can be safely assessed and treated via synchronous audio and visual telemedicine encounter.    Reason for Telemedicine Visit: Patient has requested telehealth visit  Originating Site (Patient Location): Patient's home  Distant Site (Provider Location): [facility] Outpatient Setting: Adult Day Tx  Consent:  The patient/guardian has verbally consented to: the potential risks and benefits of telemedicine (video visit) versus in person care; bill my insurance or make self-payment for services provided; and responsibility for payment of non-covered services.   Mode of Communication:  Video Conference via [email or web address]  As the provider I attest to compliance with applicable laws and regulations related to telemedicine.    PATIENT'S NAME: Mariusz Huntley  MRN:   [id number]  :   1985  ACCT. NUMBER: [id number]  DATE OF SERVICE: 21  START TIME: 10:00 AM  END TIME: 10:50 AM  FACILITATOR: Milagros Meza PsyD  TOPIC:  Process Group    Diagnoses:  296.32 Major Depressive Disorder, Moderate  309.81 PTSD  R/O Panic Disorder    Psychiatry: [facility] [place]  Therapy: Dr. Casper, [facility]  PCP:  ......      [facility] Adult Mental Health Day Treatment  TRACK: 6    NUMBER OF PARTICIPANTS: 6          Data:    Session content: At the start of this group, patients were invited to check in by identifying themselves, describing their current emotional status, and identifying issues to address in this group.   Area(s) of treatment focus addressed in this session included Symptom Management, Personal Safety and Community Resources/Discharge Planning.  Client reported being safe today.  Reported mood is anxious.    Goal for today is to attend therapy groups. The client talked to the group about how she went outside to [facility] and her  and daughter rode their bikes. She reported that she taught her  daughter to play catch with softballs. She reported that it helped her depression to be outside.       Therapeutic Interventions/Treatment Strategies:  Psychotherapist reinforced use of skills. Treatment modalities used include Cognitive Behavioral Therapy and Dialectical Behavioral Therapy. Interventions include Coping Skills: Promoted understanding of how and when to apply grounding strategies to reduce distress and increase presence in the moment, Relapse Prevention: Coached on skills to manage factors that contribute to relapse and Mindfulness: Encouraged a plan to use mindfulness skills in daily life.    Assessment:    Patient response:   Patient responded to session by listening, accepting support and appearing alert    Possible barriers to participation / learning include: severity of symptoms    Health Issues:   None reported       Substance Use Review:   Substance Use: No active concerns identified.    Mental Status/Behavioral Observations  Appearance:   Appropriate   Eye Contact:   Good   Psychomotor Behavior: Normal   Attitude:   Cooperative   Orientation:   All  Speech   Rate / Production: Normal    Volume:  Normal   Mood:    Anxious  Depressed   Affect:    Constricted   Thought Content:   Rumination  Thought Form:  Coherent  Logical     Insight:    Good     Plan:     Safety Plan: Recommended that patient call 911 or go to the local ED should there be a change in any of these risk factors.     Barriers to treatment: None identified    Patient Contracts (see media tab):  None    Substance Use: Provided encouragement towards sobriety   Continue or Discharge: Patient will continue in the program.    Milagros Meza PsyD  May 17, 2021  Sheryl Head, FEDE,  Licensed Clinical Psychologist

## 2021-05-18 ENCOUNTER — HOSPITAL ENCOUNTER (OUTPATIENT)
Dept: BEHAVIORAL HEALTH | Facility: CLINIC | Age: 36
End: 2021-05-18
Attending: PSYCHIATRY & NEUROLOGY
Payer: COMMERCIAL

## 2021-05-18 DIAGNOSIS — F43.10 PTSD (POST-TRAUMATIC STRESS DISORDER): ICD-10-CM

## 2021-05-18 DIAGNOSIS — F33.1 MAJOR DEPRESSIVE DISORDER, RECURRENT EPISODE, MODERATE (H): ICD-10-CM

## 2021-05-18 PROCEDURE — 90853 GROUP PSYCHOTHERAPY: CPT | Mod: GT | Performed by: SOCIAL WORKER

## 2021-05-18 PROCEDURE — 90853 GROUP PSYCHOTHERAPY: CPT | Mod: GT

## 2021-05-18 NOTE — GROUP NOTE
Psychoeducation Group Note    PATIENT'S NAME: Mariusz Huntley  MRN:   [id number]  :   1985  ACCT. NUMBER: [id number]  DATE OF SERVICE: 21  START TIME: 11:00 AM  END TIME: 11:50 AM  FACILITATOR: Karine Negrete LICSW  TOPIC: MARIA ESTHER RN Group: [facility] Adult Mental Health Day Treatment  TRACK: 2A    NUMBER OF PARTICIPANTS: 5    Summary of Group / Topics Discussed:  Foundations of Health: Sleep: Case study/sleep hygiene: Patients explored the connection between sleep and mental illness. Patients learned about how adequate sleep can improve health, productivity, wellness, quality of life, and safety.     Patient Session Goals / Objectives:  ? Demonstrated understanding of sleep hygiene practices and benefits of sleep  ? Identified sleep hygiene strategies to utilize     Described the connection between sleep disturbances and mental illness  Telemedicine Visit: The patient's condition can be safely assessed and treated via synchronous audio and visual telemedicine encounter.      Reason for Telemedicine Visit: Services only offered telehealth    Originating Site (Patient Location): Patient's home    Distant Site (Provider Location): Provider Remote Setting    Consent:  The patient/guardian has verbally consented to: the potential risks and benefits of telemedicine (video visit) versus in person care; bill my insurance or make self-payment for services provided; and responsibility for payment of non-covered services.     Mode of Communication:  Video Conference via [employer]    As the provider I attest to compliance with applicable laws and regulations related to telemedicine.         Patient Participation / Response:  Fully participated with the group by sharing personal reflections / insights and openly received / provided feedback with other participants.    Identified / Expressed personal readiness to practice skills    Treatment Plan:  Patient has a current  master individualized treatment plan.  See Epic treatment plan for more information.    Karine Negrete, [facility]

## 2021-05-18 NOTE — PROGRESS NOTES
Patient Active Problem List   Diagnosis     Anxiety     PTSD (post-traumatic stress disorder)     Major depressive disorder, recurrent episode, moderate (H)     Posttraumatic stress disorder     Suicidal ideation       Current Outpatient Medications:      acetaminophen (TYLENOL) 325 MG tablet, Take 2 tablets (650 mg) by mouth every 4 hours as needed for mild pain (to moderate pain), Disp:  , Rfl:      cyclobenzaprine (FLEXERIL) 10 MG tablet, Take 1 tablet (10 mg) by mouth 3 times daily as needed for muscle spasms, Disp: 60 tablet, Rfl: 0     EPINEPHrine (ANY BX GENERIC EQUIV) 0.3 MG/0.3ML injection 2-pack, Inject 0.3 mg into the muscle as needed for anaphylaxis, Disp: , Rfl:      escitalopram (LEXAPRO) 20 MG tablet, Take 1 tablet (20 mg) by mouth daily, Disp: 30 tablet, Rfl: 1     gabapentin (NEURONTIN) 800 MG tablet, Take 1 tablet (800 mg) by mouth 3 times daily, Disp: 90 tablet, Rfl: 1     hydrOXYzine (ATARAX) 50 MG tablet, Take 1 tablet (50 mg) by mouth every 4 hours as needed for anxiety, Disp: 90 tablet, Rfl: 1     ibuprofen (ADVIL/MOTRIN) 600 MG tablet, Take 1 tablet (600 mg) by mouth every 6 hours as needed for moderate pain, Disp:  , Rfl:      melatonin 3 MG tablet, Take 1-2 tablets (3-6 mg) by mouth nightly as needed for sleep, Disp: 60 tablet, Rfl: 1     Multiple Vitamins-Minerals (MULTIVITAMIN GUMMIES ADULT PO), Take 4 tablets by mouth daily, Disp: , Rfl:      ondansetron (ZOFRAN-ODT) 4 MG ODT tab, Take 4 mg by mouth every 6 hours as needed for nausea or vomiting, Disp: , Rfl:      polyethylene glycol (MIRALAX) 17 GM/Dose powder, Take 17 g by mouth daily, Disp: , Rfl:      propranolol (INDERAL) 20 MG tablet, Take 1 tablet (20 mg) by mouth 2 times daily, Disp: 60 tablet, Rfl: 1     senna-docusate (SENOKOT-S/PERICOLACE) 8.6-50 MG tablet, Take 1 tablet by mouth 2 times daily as needed for constipation, Disp:  , Rfl:      traZODone (DESYREL) 50 MG tablet, Take 1 tablet (50 mg) by mouth nightly as needed for  sleep (may repeat after 60 minutes), Disp: 30 tablet, Rfl: 1     Vitamin D3 (CHOLECALCIFEROL) 25 mcg (1000 units) tablet, Take 2 tablets (50 mcg) by mouth daily, Disp: 60 tablet, Rfl: 1  Psychiatry staffing: case discussed  Diagnosis:  As above;  Panic attacks as well, sober, flashbacks and nightmares.

## 2021-05-18 NOTE — GROUP NOTE
Process Group Note    PATIENT'S NAME: Mariusz Huntley  MRN:   [id number]  :   1985  ACCT. NUMBER: [id number]  DATE OF SERVICE: 21  START TIME:  9:00 AM  END TIME:  9:50 AM  FACILITATOR: Natalie Lugo LICSW  TOPIC:  Process Group    Diagnoses:  1.  Major depressive disorder, recurrent.     2.  Generalized anxiety disorder.     3.  Posttraumatic stress disorder.   4.  Cannabis use disorder.     5.  Alcohol use disorder, in remission.   Axis II:  Deferred.   Axis III:  History of concussion 2020.   Axis I:     1.  Major depressive disorder, recurrent.     2.  Generalized anxiety disorder.     3.  Posttraumatic stress disorder.   4.  Cannabis use disorder.     5.  Alcohol use disorder, in remission.       [facility] Adult Mental Health Day Treatment  TRACK: 2A    Telemedicine Visit: The patient's condition can be safely assessed and treated via synchronous audio and visual telemedicine encounter.      Reason for Telemedicine Visit: Services only offered telehealth    Originating Site (Patient Location): Patient's home    Distant Site (Provider Location): [facility]: [facility]    Consent:  The patient/guardian has verbally consented to: the potential risks and benefits of telemedicine (video visit) versus in person care; bill my insurance or make self-payment for services provided; and responsibility for payment of non-covered services.     Mode of Communication:  Video Conference via zoom    As the provider I attest to compliance with applicable laws and regulations related to telemedicine.      NUMBER OF PARTICIPANTS: 6          Data:    Session content: At the start of this group, patients were invited to check in by identifying themselves, describing their current emotional status, and identifying issues to address in this group.   Area(s) of treatment focus addressed in this session included Symptom Management and Personal Safety.  Pt reports feeling more hopeful  identifying improvement since getting off depakote. She is looking at returning to work, but is anxious about talking to her manager about request for job change (non-customer facing and transferring to different branch). Pt talked about ways to approach her boss. She reports using cannabis and discussed the benefits of using ie: sleep and fun with . She denies problems with drug use. Pt is proud of her progress since starting the program. Denies safety concerns.    Therapeutic Interventions/Treatment Strategies:  Psychotherapist offered support, feedback and validation and reinforced use of skills. Treatment modalities used include Cognitive Behavioral Therapy. Interventions include Symptoms Management: Promoted understanding of their diagnoses and how it impacts their functioning and Relationship Skills: Assisted patients in implementing more effective communication skills in their relationships.    Assessment:    Patient response:   Patient responded to session by accepting feedback, giving feedback, listening, focusing on goals, being attentive and accepting support    Possible barriers to participation / learning include: and no barriers identified    Health Issues:   None reported       Substance Use Review:   Substance Use: cannabis .  and Last use: yesterday    Mental Status/Behavioral Observations  Appearance:   Appropriate   Eye Contact:   Good   Psychomotor Behavior: Normal   Attitude:   Cooperative   Orientation:   All  Speech   Rate / Production: Normal    Volume:  Normal   Mood:    Anxious  Normal  Affect:    Appropriate   Thought Content:   Clear  Thought Form:  Coherent  Logical     Insight:    Good     Plan:     Safety Plan: No current safety concerns identified.  Recommended that patient call 911 or go to the local ED should there be a change in any of these risk factors.     Barriers to treatment: None identified    Patient Contracts (see media tab):  None    Substance Use: Not addressed in  session     Continue or Discharge: Patient will continue in Adult Day Treatment (ADT)  as planned. Patient is likely to benefit from learning and using skills as they work toward the goals identified in their treatment plan.      Natalie Magallon, [facility]  May 18, 2021

## 2021-05-18 NOTE — GROUP NOTE
Psychotherapy Group Note    PATIENT'S NAME: Mariusz Huntley  MRN:   [id number]  :   1985  ACCT. NUMBER: [id number]  DATE OF SERVICE: 21  START TIME: 10:00 AM  END TIME: 10:50 AM  FACILITATOR: Natalie Lguo LICSW  TOPIC: MH EBP Group: Coping Skills  [facility] Adult Mental Health Day Treatment  TRACK: 2A    Telemedicine Visit: The patient's condition can be safely assessed and treated via synchronous audio and visual telemedicine encounter.      Reason for Telemedicine Visit: Services only offered telehealth    Originating Site (Patient Location): Patient's home    Distant Site (Provider Location): [facility]: [facility]    Consent:  The patient/guardian has verbally consented to: the potential risks and benefits of telemedicine (video visit) versus in person care; bill my insurance or make self-payment for services provided; and responsibility for payment of non-covered services.     Mode of Communication:  Video Conference via zoom    As the provider I attest to compliance with applicable laws and regulations related to telemedicine.      NUMBER OF PARTICIPANTS: 6    Summary of Group / Topics Discussed:  Coping Skills: Grounding: Patients discussed and practiced strategies to increase attachment / presence to the current moment.  Patients identified situations in which using these strategies will help improve emotion regulation sense of calm in the body.  Reviewed the benefits of applying grounding strategies, as well as past / current practices of each member.  Patients identified situations in which using these strategies would reduce stress. They developed the ability to distinguish when these strategies can be useful in their lives for management and stress and psychological well-being.    Patient Session Goals / Objectives:    Understand the purpose of using grounding strategies to reduce stress.    Verbalize understanding of how and when to apply grounding  strategies to reduce distress and increase presence in the moment.    Review patients current grounding practices and discuss a more formal way of practicing and accessing skills.    Practice using various calming strategies (e.g. 5-4-3-2-1; mental and body awareness).    Choose 1-2 grounding strategies to apply during times of distress.        Patient Participation / Response:  Fully participated with the group by sharing personal reflections / insights and openly received / provided feedback with other participants.    Demonstrated understanding of topics discussed through group discussion and participation, Expressed understanding of the relevance / importance of coping skills at distressing times in life and Practiced 2-3 new coping skills in session    Treatment Plan:  Patient has a current master individualized treatment plan.  See Epic treatment plan for more information.    Natalie Magallon, ESMESW

## 2021-05-20 ENCOUNTER — HOSPITAL ENCOUNTER (OUTPATIENT)
Dept: BEHAVIORAL HEALTH | Facility: CLINIC | Age: 36
End: 2021-05-20
Attending: PSYCHIATRY & NEUROLOGY
Payer: COMMERCIAL

## 2021-05-20 DIAGNOSIS — F43.10 PTSD (POST-TRAUMATIC STRESS DISORDER): ICD-10-CM

## 2021-05-20 DIAGNOSIS — F33.1 MAJOR DEPRESSIVE DISORDER, RECURRENT EPISODE, MODERATE (H): ICD-10-CM

## 2021-05-20 PROCEDURE — 90853 GROUP PSYCHOTHERAPY: CPT | Mod: GT | Performed by: SOCIAL WORKER

## 2021-05-20 PROCEDURE — 90853 GROUP PSYCHOTHERAPY: CPT | Mod: GT | Performed by: COUNSELOR

## 2021-05-20 PROCEDURE — 90853 GROUP PSYCHOTHERAPY: CPT | Mod: GT | Performed by: PSYCHOLOGIST

## 2021-05-20 NOTE — GROUP NOTE
Psychotherapy Group Note    PATIENT'S NAME: Mariusz Huntley  MRN:   [id number]  :   1985  ACCT. NUMBER: [id number]  DATE OF SERVICE: 21  START TIME: 10:00 AM  END TIME: 10:50 AM  FACILITATOR: Zuri Armando LPCC  TOPIC: MH EBP Group: Cognitive Restructuring  [facility] Adult Mental Health Day Treatment  TRACK: 2A    NUMBER OF PARTICIPANTS: 5    Summary of Group / Topics Discussed:  Cognitive Restructuring: Introduction and Overview: Patients were introduced to Cognitive Behavioral Therapy (CBT) as a way to identify ineffective thought patterns, understand factors that contribute to ineffective thought patterns, gain awareness of the impact of those thoughts on emotions and behavior, and learn methods for modifying thinking to achieve better mood regulation. Patients received a general overview of how ones thoughts influence feelings and behaviors in the context of CBT. Patients explored the development of their own thought patterns and how they impact daily functioning.      Patient Session Goals / Objectives:    Familiarize self with the interrelationship of thoughts, feelings and behavior.    Identify ineffective / unhelpful thought patterns and their impact on mood.    Telemedicine Visit: The patient's condition can be safely assessed and treated via synchronous audio and visual telemedicine encounter.      Reason for Telemedicine Visit: Services only offered telehealth and due to COVID-19    Originating Site (Patient Location): Patient's home    Distant Site (Provider Location): Provider Remote Setting    Consent:  The patient/guardian has verbally consented to: the potential risks and benefits of telemedicine (video visit) versus in person care; bill my insurance or make self-payment for services provided; and responsibility for payment of non-covered services.     Mode of Communication:  Video Conference via Zoom    As the provider I attest to compliance with applicable laws and  regulations related to telemedicine.             Patient Participation / Response:  Fully participated with the group by sharing personal reflections / insights and openly received / provided feedback with other participants.    Demonstrated understanding of topics discussed through group discussion and participation, Expressed understanding of the relationship between behaviors, thoughts, and feelings and Demonstrated knowledge of personal thought patterns and how they impact their mood and behavior.    Treatment Plan:  Patient has a current master individualized treatment plan.  See Epic treatment plan for more information.    Zuri Armando, LANIC

## 2021-05-20 NOTE — GROUP NOTE
"Process Group Note    PATIENT'S NAME: Mariusz Huntley  MRN:   [id number]  :   1985  ACCT. NUMBER: [id number]  DATE OF SERVICE: 21  START TIME: 10:00 AM  END TIME: 10:50 AM  FACILITATOR: Zuri Armando LPCC  TOPIC: MH Process Group    Diagnoses:  ***    [facility] Day Treatment  TRACK: 2A    NUMBER OF PARTICIPANTS: 5    Telemedicine Visit: The patient's condition can be safely assessed and treated via synchronous audio and visual telemedicine encounter.      Reason for Telemedicine Visit: Services only offered telehealth and due to COVID-19    Originating Site (Patient Location): Patient's home    Distant Site (Provider Location): Provider Remote Setting    Consent:  The patient/guardian has verbally consented to: the potential risks and benefits of telemedicine (video visit) versus in person care; bill my insurance or make self-payment for services provided; and responsibility for payment of non-covered services.     Mode of Communication:  Video Conference via Zoom    As the provider I attest to compliance with applicable laws and regulations related to telemedicine.            Data:    Session content: At the start of this group, patients were invited to check in by identifying themselves, describing their current emotional status, and identifying issues to address in this group.   Area(s) of treatment focus addressed in this session included {OP BEH ADULT  AREA OF TREATMENT FOCUS:715901}.  ***    Therapeutic Interventions/Treatment Strategies:  {OP  THERAPEUTIC INTERVENTIONS/TREATMENT:279076}    Assessment:    Patient response:   Patient responded to session by {PATIENT RESPONSE:049691}    Possible barriers to participation / learning include: {POSSIBLE BARRIERS:257113}    Health Issues:   {YES / NO:844277}       Substance Use Review:   Substance Use: {YES / NO:641769}    Mental Status/Behavioral Observations  Appearance:   {Appearance:187308::\"Appropriate " "\"}  Eye Contact:   {Eye Contact:696381::\"Good \"}  Psychomotor Behavior: {Psychomotor Behavior:545749::\"Normal \"}  Attitude:   {Attitude:837392::\"Cooperative \"}  Orientation:   {Orientation:130904::\"All\"}  Speech   Rate / Production: {Speech Rate/Production:033755::\"Normal \"}   Volume:  {Speech Volume:782647::\"Normal \"}  Mood:    {Mood:047570::\"Normal\"}  Affect:    {Affect:833225::\"Appropriate \"}  Thought Content:   {Thought Content with Safety:567046}  Thought Form:  {Thought Form:065409::\"Coherent \",\"Logical \"}    Insight:    {Insight:537468::\"Good \"}    Plan:     Safety Plan: {SAFETY PLAN:951394}     Barriers to treatment: {Barriers to Treatment:436536}    Patient Contracts (see media tab):  {Patient Contracts:793496}    Substance Use: {SUBSTANCE USE ASSESSMENT/INTERVENTION:155379}     Continue or Discharge: {Continue or Discharge:346854}      Zuri Armando [facility]  May 20, 2021  "

## 2021-05-20 NOTE — GROUP NOTE
Process Group Note    PATIENT'S NAME: Mariusz Huntley  MRN:   [id number]  :   1985  ACCT. NUMBER: [id number]  DATE OF SERVICE: 21  START TIME:  9:00 AM  END TIME:  9:50 AM  FACILITATOR: Eric Alejandre LP  TOPIC:  Process Group    Diagnoses:  1.  Major depressive disorder, recurrent.     2.  Generalized anxiety disorder.     3.  Posttraumatic stress disorder.   4.  Cannabis use disorder.     5.  Alcohol use disorder, in remission.   Axis II:  Deferred.   Axis III:  History of concussion 2020.   Axis I:     1.  Major depressive disorder, recurrent.     2.  Generalized anxiety disorder.     3.  Posttraumatic stress disorder.   4.  Cannabis use disorder.     5.  Alcohol use disorder, in remission.     [facility] Mental [facility] Day Treatment  TRACK: 2A    NUMBER OF PARTICIPANTS: 5    Telemedicine Visit: The patient's condition can be safely assessed and treated via synchronous audio and visual telemedicine encounter.      Reason for Telemedicine Visit: Services only offered telehealth    Originating Site (Patient Location): Patient's home    Distant Site (Provider Location): Provider Remote Setting    Consent:  The patient/guardian has verbally consented to: the potential risks and benefits of telemedicine (video visit) versus in person care; bill my insurance or make self-payment for services provided; and responsibility for payment of non-covered services.     Mode of Communication:  Video Conference via [employer]    As the provider I attest to compliance with applicable laws and regulations related to telemedicine.            Data:    Session content: At the start of this group, patients were invited to check in by identifying themselves, describing their current emotional status, and identifying issues to address in this group.   Area(s) of treatment focus addressed in this session included Symptom Management, Personal Safety, Community Resources/Discharge  Planning, Develop / Improve Independent Living Skills and Develop Socialization / Interpersonal Relationship Skills.    Pt reports poor sleep, pretty good mood she reports. Pt reports cannabis is helping her with sleep. Pt reports less irritability.     Therapeutic Interventions/Treatment Strategies:  Psychotherapist offered support, feedback and validation and reinforced use of skills.    Assessment:    Patient response:   Patient responded to session by accepting feedback, giving feedback, listening, focusing on goals, being attentive, accepting support and appearing alert    Possible barriers to participation / learning include: and no barriers identified    Health Issues:   None reported       Substance Use Review:   Substance Use: cannabis .     Mental Status/Behavioral Observations  Appearance:   Appropriate   Eye Contact:   Good   Psychomotor Behavior: Normal   Attitude:   Cooperative   Orientation:   All  Speech   Rate / Production: Normal    Volume:  Normal   Mood:    Normal  Affect:    Appropriate   Thought Content:   Clear  Thought Form:  Coherent  Logical     Insight:    Good     Plan:     Safety Plan: Recommended that patient call 911 or go to the local ED should there be a change in any of these risk factors.     Barriers to treatment: None identified    Patient Contracts (see media tab):  None    Substance Use: Not addressed in session     Continue or Discharge: Patient will continue in Adult Day Treatment (ADT)  as planned. Patient is likely to benefit from learning and using skills as they work toward the goals identified in their treatment plan.      Eric Alejandre LP  May 20, 2021

## 2021-05-20 NOTE — GROUP NOTE
Psychoeducation Group Note    PATIENT'S NAME: Mariusz Huntley  MRN:   [id number]  :   1985  ACCT. NUMBER: [id number]  DATE OF SERVICE: 21  START TIME: 11:00 AM  END TIME: 11:50 AM  FACILITATOR: Karine Negrete LICSW  TOPIC: MARIA ESTHER RN Group: Health Maintenance  [facility] Adult Mental Health Day Treatment  TRACK: 2A    NUMBER OF PARTICIPANTS: 5    Summary of Group / Topics Discussed:  Health Maintenance: Discharge planning/Community resources: Patients worked on completing an instructor-facilitated discharge planning activity. Discharge planning begins for all patients after admission. Competent discharge planning promotes a successful transition and decreases the likelihood of mental health relapse. In this group, all dimensions of wellness were reviewed to assess for needs/discharge readiness. These dimensions included: physical, emotional, occupational/productivity, environmental, social, spiritual, intellectual, and financial. Patients worked on completing/updating their discharge planning and identifying their treatment needs prior to time of discharge.     Patient Session Goals / Objectives:  ? Identified unmet treatment needs to accomplish before discharge  ? Completed all dimensions of the discharge planning packet  ? Participated in the planning process, make phone calls, set up appointments, got connected with community resources, followed up with treatment team as needed    Telemedicine Visit: The patient's condition can be safely assessed and treated via synchronous audio and visual telemedicine encounter.      Reason for Telemedicine Visit: Services only offered telehealth    Originating Site (Patient Location): Patient's home    Distant Site (Provider Location): Provider Remote Setting    Consent:  The patient/guardian has verbally consented to: the potential risks and benefits of telemedicine (video visit) versus in person care; bill my insurance or make self-payment for  services provided; and responsibility for payment of non-covered services.     Mode of Communication:  Video Conference via [employer]    As the provider I attest to compliance with applicable laws and regulations related to telemedicine.        Patient Participation / Response:  Fully participated with the group by sharing personal reflections / insights and openly received / provided feedback with other participants.    Identified / Expressed personal readiness to practice skills    Treatment Plan:  Patient has a current master individualized treatment plan.  See Epic treatment plan for more information.    Karine Negrete, ESMESW

## 2021-05-24 ENCOUNTER — HOSPITAL ENCOUNTER (OUTPATIENT)
Dept: BEHAVIORAL HEALTH | Facility: CLINIC | Age: 36
End: 2021-05-24
Attending: PSYCHIATRY & NEUROLOGY
Payer: COMMERCIAL

## 2021-05-24 DIAGNOSIS — F43.10 PTSD (POST-TRAUMATIC STRESS DISORDER): ICD-10-CM

## 2021-05-24 DIAGNOSIS — F33.1 MAJOR DEPRESSIVE DISORDER, RECURRENT EPISODE, MODERATE (H): ICD-10-CM

## 2021-05-24 PROCEDURE — 90853 GROUP PSYCHOTHERAPY: CPT | Mod: GT | Performed by: PSYCHOLOGIST

## 2021-05-24 PROCEDURE — 90853 GROUP PSYCHOTHERAPY: CPT | Mod: GT | Performed by: SOCIAL WORKER

## 2021-05-24 NOTE — GROUP NOTE
Psychoeducation Group Note    PATIENT'S NAME: Mariusz Huntley  MRN:   [id number]  :   1985  ACCT. NUMBER: [id number]  DATE OF SERVICE: 21  START TIME: 11:00 AM  END TIME: 11:25 AM  FACILITATOR: Natalie Lugo LICSW  TOPIC: MH RN Group: Health Maintenance  [facility] Adult Mental Health Day Treatment  TRACK: 2A    Telemedicine Visit: The patient's condition can be safely assessed and treated via synchronous audio and visual telemedicine encounter.      Reason for Telemedicine Visit: Services only offered telehealth    Originating Site (Patient Location): Patient's home    Distant Site (Provider Location): [facility]: [facility]    Consent:  The patient/guardian has verbally consented to: the potential risks and benefits of telemedicine (video visit) versus in person care; bill my insurance or make self-payment for services provided; and responsibility for payment of non-covered services.     Mode of Communication:  Video Conference via zoom    As the provider I attest to compliance with applicable laws and regulations related to telemedicine.      NUMBER OF PARTICIPANTS: 4    Summary of Group / Topics Discussed:  Health Maintenance: Eight Dimensions of Wellness: The concept of holistic health through the model of eight dimensions was introduced. Group members participated in identifying behaviors and activities in each of the dimensions of wellness.  The importance of each dimension was reinforced and the concept of balance in life as it relates to wellness was explored.      Patient Session Goals / Objectives:    Verbalized understanding of balance in wellness and how it relates to their life    Identified and explained the eight dimensions of wellness    Categorized activities and wellness needs into corresponding dimensions appropriately during exercise          Patient Participation / Response:  Fully participated with the group by sharing personal reflections /  insights and openly received / provided feedback with other participants.    Demonstrated understanding of topics discussed through group discussion and participation and Identified / Expressed personal readiness to practice skills    Treatment Plan:  Patient has a current master individualized treatment plan.  See Epic treatment plan for more information.    Natalie Magallon, LICSW

## 2021-05-24 NOTE — GROUP NOTE
Process Group Note    PATIENT'S NAME: Mariusz Huntley  MRN:   [id number]  :   1985  ACCT. NUMBER: [id number]  DATE OF SERVICE: 21  START TIME: 10:00 AM  END TIME: 10:50 AM  FACILITATOR: Eric Alejandre LP  TOPIC:  Process Group    Diagnoses:  1.  Major depressive disorder, recurrent.     2.  Generalized anxiety disorder.     3.  Posttraumatic stress disorder.   4.  Cannabis use disorder.     5.  Alcohol use disorder, in remission.   Axis II:  Deferred.   Axis III:  History of concussion 2020.   Axis I:     1.  Major depressive disorder, recurrent.     2.  Generalized anxiety disorder.     3.  Posttraumatic stress disorder.   4.  Cannabis use disorder.     5.  Alcohol use disorder, in remission.        [facility] Mental [facility] Day Treatment  TRACK: 2A    NUMBER OF PARTICIPANTS: 4    Telemedicine Visit: The patient's condition can be safely assessed and treated via synchronous audio and visual telemedicine encounter.      Reason for Telemedicine Visit: Services only offered telehealth    Originating Site (Patient Location): Patient's home    Distant Site (Provider Location): Provider Remote Setting    Consent:  The patient/guardian has verbally consented to: the potential risks and benefits of telemedicine (video visit) versus in person care; bill my insurance or make self-payment for services provided; and responsibility for payment of non-covered services.     Mode of Communication:  Video Conference via [employer]    As the provider I attest to compliance with applicable laws and regulations related to telemedicine.            Data:    Session content: At the start of this group, patients were invited to check in by identifying themselves, describing their current emotional status, and identifying issues to address in this group.   Area(s) of treatment focus addressed in this session included Symptom Management, Personal Safety, Community Resources/Discharge  "Planning and Develop Socialization / Interpersonal Relationship Skills.    Pt reports her mood is \"decent.\" Pt report she went swimming over the weekend and had a nice time. Pt reports she kept her mind busy which helped her.     Therapeutic Interventions/Treatment Strategies:  Psychotherapist offered support, feedback and validation. Treatment modalities used include Cognitive Behavioral Therapy. Interventions include Emotions Management:  Increased awareness of daily mood patterns/changes.    Assessment:    Patient response:   Patient responded to session by accepting feedback, giving feedback, listening, focusing on goals, being attentive, accepting support and appearing alert    Possible barriers to participation / learning include: and no barriers identified    Health Issues:   None reported       Substance Use Review:   Substance Use: No active concerns identified.    Mental Status/Behavioral Observations  Appearance:   Appropriate   Eye Contact:   Good   Psychomotor Behavior: Normal   Attitude:   Cooperative   Orientation:   All  Speech   Rate / Production: Normal    Volume:  Normal   Mood:    Depressed   Affect:    Constricted   Thought Content:   Clear and Safety denies any current safety concerns including suicidal ideation, self-harm, and homicidal ideation  Thought Form:  Coherent  Logical     Insight:    Fair     Plan:     Safety Plan: Recommended that patient call 911 or go to the local ED should there be a change in any of these risk factors.     Barriers to treatment: None identified    Patient Contracts (see media tab):  None    Substance Use: Not addressed in session     Continue or Discharge: Patient will continue in Adult Day Treatment (ADT)  as planned. Patient is likely to benefit from learning and using skills as they work toward the goals identified in their treatment plan.      Eric Alejandre LP  May 24, 2021  "

## 2021-05-24 NOTE — GROUP NOTE
Psychotherapy Group Note    PATIENT'S NAME: Mariusz Huntley  MRN:   [id number]  :   1985  ACCT. NUMBER: [id number]  DATE OF SERVICE: 21  START TIME:  9:00 AM  END TIME:  9:50 AM  FACILITATOR: Karine Negrete LICSW  TOPIC: MH EBP Group: Coping Skills  [facility] Adult Mental Health Day Treatment  TRACK: 2A    NUMBER OF PARTICIPANTS: 5    Summary of Group / Topics Discussed:  Coping Skills: Building Positive Experiences: Patients discussed the importance of planning and engaging in positive experiences, as strategies to increase positive thinking, hope, and self-worth.  Explored the benefits of planning / creating positive experiences, including recognizing and reducing negativity bias by focusing on and building positive experiences.   Several approaches to building positive experiences were presented and discussed relevant to each patient.      Patient Session Goals / Objectives:    Understand the purpose of planning / creating / participating / sharing in positive experiences.    Explore patient s experiences related to negative thinking and how it influences activities and moodIdentify current positive events in patient s life.     Set goals to increase a variety of positive experiences.    Address barriers to planning / engaging in positive experiences.  Telemedicine Visit: The patient's condition can be safely assessed and treated via synchronous audio and visual telemedicine encounter.      Reason for Telemedicine Visit: Services only offered telehealth    Originating Site (Patient Location): Patient's home    Distant Site (Provider Location): Provider Remote Setting    Consent:  The patient/guardian has verbally consented to: the potential risks and benefits of telemedicine (video visit) versus in person care; bill my insurance or make self-payment for services provided; and responsibility for payment of non-covered services.     Mode of Communication:  Video Conference via  [employer]oc[employer]    As the provider I attest to compliance with applicable laws and regulations related to telemedicine.       Patient Participation / Response:  Fully participated with the group by sharing personal reflections / insights and openly received / provided feedback with other participants.    Expressed understanding of the relevance / importance of coping skills at distressing times in life and Demonstrated knowledge of when to consider using a variety of coping skills in daily life    Treatment Plan:  Patient has a current master individualized treatment plan.  See Epic treatment plan for more information.    Karine Negrete, ESMESW

## 2021-05-25 ENCOUNTER — HOSPITAL ENCOUNTER (OUTPATIENT)
Dept: BEHAVIORAL HEALTH | Facility: CLINIC | Age: 36
End: 2021-05-25
Attending: PSYCHIATRY & NEUROLOGY
Payer: COMMERCIAL

## 2021-05-25 DIAGNOSIS — F43.10 PTSD (POST-TRAUMATIC STRESS DISORDER): ICD-10-CM

## 2021-05-25 DIAGNOSIS — F33.1 MAJOR DEPRESSIVE DISORDER, RECURRENT EPISODE, MODERATE (H): ICD-10-CM

## 2021-05-25 PROCEDURE — 90853 GROUP PSYCHOTHERAPY: CPT | Mod: GT | Performed by: PSYCHOLOGIST

## 2021-05-25 PROCEDURE — 90853 GROUP PSYCHOTHERAPY: CPT | Mod: GT | Performed by: SOCIAL WORKER

## 2021-05-25 NOTE — GROUP NOTE
Psychotherapy Group Note    PATIENT'S NAME: Mariusz Huntley  MRN:   [id number]  :   1985  ACCT. NUMBER: [id number]  DATE OF SERVICE: 21  START TIME: 11:00 AM  END TIME: 11:50 AM  FACILITATOR: Karine Negrete LICSW  TOPIC: MH EBP Group: Self-Awareness  [facility] Adult Mental Health Day Treatment  TRACK: 2A    NUMBER OF PARTICIPANTS: 6    Summary of Group / Topics Discussed:  Self-Awareness: Gratitude: Topic focused on assisting patients in identifying key concepts in gratitude. Patients discussed the benefits of practicing gratitude and its impact on mood improvement, mindfulness, and perspective. Patients worked to increase time spent on recognition and appreciation of what is positive and working in their lives. Patients discussed the concepts and benefits of feeling grateful. The goal is to reduce rumination and negative thinking resulting in increased mindfulness and resilience. Patients specifically discussed how they can practice and problem solve barriers to daily gratitude practice.     Patient Session Goals / Objectives:    [place] the concept and benefits of gratitude    Identified ways to practice gratitude in daily life    Problem solved barriers to practicing gratitude    Telemedicine Visit: The patient's condition can be safely assessed and treated via synchronous audio and visual telemedicine encounter.      Reason for Telemedicine Visit: Services only offered telehealth    Originating Site (Patient Location): Patient's home    Distant Site (Provider Location): Provider Remote Setting    Consent:  The patient/guardian has verbally consented to: the potential risks and benefits of telemedicine (video visit) versus in person care; bill my insurance or make self-payment for services provided; and responsibility for payment of non-covered services.     Mode of Communication:  Video Conference via [employer]    As the provider I attest to compliance with applicable laws  and regulations related to telemedicine.       Patient Participation / Response:  Fully participated with the group by sharing personal reflections / insights and openly received / provided feedback with other participants.    Identified / Expressed readiness to act intentionally, increase self-compassion, promote personal growth    Treatment Plan:  Patient has a current master individualized treatment plan.  See Epic treatment plan for more information.    Karine Negrete, LICSW

## 2021-05-25 NOTE — GROUP NOTE
Psychoeducation Group Note    PATIENT'S NAME: Mariusz Huntley  MRN:   [id number]  :   1985  ACCT. NUMBER: [id number]  DATE OF SERVICE: 21  START TIME: 10:00 AM  END TIME: 10:50 AM  FACILITATOR: Eric Alejandre LP  TOPIC: MH RN Group: Health Maintenance  [facility] Adult Mental Health Day Treatment  TRACK: 2A    NUMBER OF PARTICIPANTS: 6    Telemedicine Visit: The patient's condition can be safely assessed and treated via synchronous audio and visual telemedicine encounter.      Reason for Telemedicine Visit: Services only offered telehealth    Originating Site (Patient Location): Patient's home    Distant Site (Provider Location): Provider Remote Setting    Consent:  The patient/guardian has verbally consented to: the potential risks and benefits of telemedicine (video visit) versus in person care; bill my insurance or make self-payment for services provided; and responsibility for payment of non-covered services.     Mode of Communication:  Video Conference via [employer]    As the provider I attest to compliance with applicable laws and regulations related to telemedicine.      Summary of Group / Topics Discussed:  Health Maintenance: Wellness Check-in: Patients met with group facilitator to individually review a holistic wellness check-in to assess patient medication adherence/concerns, appointments, physical and mental health, exercise, nutrition, sleep, socialization, substance use, and need for service/resource referrals.       Patient Session Goals / Objectives:    Discussed various aspects of health management and self-care related to physical and mental health    Demonstrated increased self-awareness of current wellness needs    Developed health literacy skills in navigating the healthcare system and self-advocacy/communicating needs with health care team          Patient Participation / Response:  Fully participated with the group by sharing personal reflections  / insights and openly received / provided feedback with other participants.    Demonstrated understanding of topics discussed through group discussion and participation    Treatment Plan:  Patient has a current master individualized treatment plan.  See Epic treatment plan for more information.    Eric Alejandre LP

## 2021-05-25 NOTE — GROUP NOTE
Process Group Note  Telemedicine Visit: The patient's condition can be safely assessed and treated via synchronous audio and visual telemedicine encounter.    Reason for Telemedicine Visit: Patient has requested telehealth visit  Originating Site (Patient Location): Patient's home  Distant Site (Provider Location): [facility] Outpatient Setting: Adult Day Tx  Consent:  The patient/guardian has verbally consented to: the potential risks and benefits of telemedicine (video visit) versus in person care; bill my insurance or make self-payment for services provided; and responsibility for payment of non-covered services.   Mode of Communication:  Video Conference via [employer]  As the provider I attest to compliance with applicable laws and regulations related to telemedicine.    PATIENT'S NAME: Mariusz Huntley  MRN:   [id number]  :   1985  ACCT. NUMBER: [id number]  DATE OF SERVICE: 21  START TIME:  9:00 AM  END TIME:  9:50 AM  FACILITATOR: Milagros Meza PsyD  TOPIC:  Process Group    Diagnoses:  296.32 Major Depressive Disorder, Moderate  309.81 PTSD  R/O Panic Disorder    Psychiatry: [facility] [place]  Therapy: Dr. Casper, [facility]  PCP:  ......      [facility] Adult Mental Health Day Treatment  TRACK: 2A IOP    NUMBER OF PARTICIPANTS: 6          Data:    Session content: At the start of this group, patients were invited to check in by identifying themselves, describing their current emotional status, and identifying issues to address in this group.   Area(s) of treatment focus addressed in this session included Symptom Management, Personal Safety and Community Resources/Discharge Planning.  Client reported being safe today.  Reported mood is ok.    Goal for today is to attend therapy groups. The client talked to the group about how she is teaching her daughter to play softball and enjoys being with the kids outside.      Therapeutic  Interventions/Treatment Strategies:  Psychotherapist reinforced use of skills. Treatment modalities used include Cognitive Behavioral Therapy and Dialectical Behavioral Therapy. Interventions include Cognitive Restructuring:  Facilitated recognition of the connection between negative thoughts and negative core beliefs, Coping Skills: Discussed meditation skills and addressed ways to implement meditation skills  and Mindfulness: Facilitated discussion of when/how to use mindfulness skills to benefit general health, mental health symptoms, and stressors.    Assessment:    Patient response:   Patient responded to session by focusing on goals, being attentive and appearing alert    Possible barriers to participation / learning include: severity of symptoms    Health Issues:   None reported       Substance Use Review:   Substance Use: No active concerns identified.    Mental Status/Behavioral Observations  Appearance:   Appropriate   Eye Contact:   Good   Psychomotor Behavior: Normal   Attitude:   Cooperative   Orientation:   All  Speech   Rate / Production: Normal    Volume:  Normal   Mood:    Depressed   Affect:    Constricted   Thought Content:   Rumination  Thought Form:  Coherent  Logical     Insight:    Good     Plan:     Safety Plan: Recommended that patient call 911 or go to the local ED should there be a change in any of these risk factors.     Barriers to treatment: None identified    Patient Contracts (see media tab):  None    Substance Use: Provided encouragement towards sobriety     Continue or Discharge: Patient will continue in Adult Day Treatment (ADT)  as planned. Patient is likely to benefit from learning and using skills as they work toward the goals identified in their treatment plan.      Milagros Meza PsyD  May 25, 2021  Sheryl Head D,  Licensed Clinical Psychologist

## 2021-05-27 ENCOUNTER — HOSPITAL ENCOUNTER (OUTPATIENT)
Dept: BEHAVIORAL HEALTH | Facility: CLINIC | Age: 36
End: 2021-05-27
Attending: PSYCHIATRY & NEUROLOGY
Payer: COMMERCIAL

## 2021-05-27 ENCOUNTER — TELEPHONE (OUTPATIENT)
Dept: BEHAVIORAL HEALTH | Facility: CLINIC | Age: 36
End: 2021-05-27

## 2021-05-27 PROCEDURE — 90853 GROUP PSYCHOTHERAPY: CPT | Mod: GT | Performed by: PSYCHOLOGIST

## 2021-05-27 PROCEDURE — 90853 GROUP PSYCHOTHERAPY: CPT | Mod: GT | Performed by: SOCIAL WORKER

## 2021-05-27 PROCEDURE — 90853 GROUP PSYCHOTHERAPY: CPT | Mod: GT

## 2021-05-27 NOTE — GROUP NOTE
Psychotherapy Group Note    PATIENT'S NAME: Mariusz Huntley  MRN:   [id number]  :   1985  ACCT. NUMBER: [id number]  DATE OF SERVICE: 21  START TIME: 11:00 AM  END TIME: 11:50 AM  FACILITATOR: Karine Negrete LICSW  TOPIC: MH EBP Group: Self-Awareness  [facility] Adult Mental Health Day Treatment  TRACK: 2A    NUMBER OF PARTICIPANTS: 4    Summary of Group / Topics Discussed:  Self-Awareness: Personal Strengths: Topic focused on assisting patients in identifying personal strengths and how they relate to the management of mental health symptoms. Patients discussed the benefits of acknowledging their personal strengths and their impact on mood improvement, mindfulness, and perspective. Patients worked to increase time spent on recognition and appreciation of what is positive and working in their lives. The goal is to reduce rumination and negative thinking resulting in increased mindfulness and resilience. Patients will work to put skills into practice and problem-solve barriers.  Clients worked on challenging negative self-talk with the use of affirmations.  Group identified a negative self-statement and wrote a affirmation or positive self-statement as an alternative.      Patient Session Goals / Objectives:    Identified personal strengths    Identified barriers to recognition of personal strengths    Verbalized understanding of strategies to increase use of their strengths in management of daily symptoms  Telemedicine Visit: The patient's condition can be safely assessed and treated via synchronous audio and visual telemedicine encounter.      Reason for Telemedicine Visit: Services only offered telehealth    Originating Site (Patient Location): Patient's home    Distant Site (Provider Location): Provider Remote Setting    Consent:  The patient/guardian has verbally consented to: the potential risks and benefits of telemedicine (video visit) versus in person care; bill my insurance  or make self-payment for services provided; and responsibility for payment of non-covered services.     Mode of Communication:  Video Conference via [employer]    As the provider I attest to compliance with applicable laws and regulations related to telemedicine.       Patient Participation / Response:  Fully participated with the group by sharing personal reflections / insights and openly received / provided feedback with other participants.    Demonstrated understanding of values, strengths, and challenges to learn about themselves    Treatment Plan:  Patient has a current master individualized treatment plan.  See Epic treatment plan for more information.    Karine Negrete, LICSW

## 2021-05-27 NOTE — GROUP NOTE
Process Group Note    PATIENT'S NAME: Mariusz Huntley  MRN:   [id number]  :   1985  ACCT. NUMBER: [id number]  DATE OF SERVICE: 21  START TIME:  9:00 AM  END TIME:  9:50 AM  FACILITATOR: Natalie Lugo LICSW  TOPIC:  Process Group    Diagnoses:  1.  Major depressive disorder, recurrent.     2.  Generalized anxiety disorder.     3.  Posttraumatic stress disorder.   4.  Cannabis use disorder.     5.  Alcohol use disorder, in remission.   Axis II:  Deferred.   Axis III:  History of concussion 2020.   Axis I:     1.  Major depressive disorder, recurrent.     2.  Generalized anxiety disorder.     3.  Posttraumatic stress disorder.   4.  Cannabis use disorder.     5.  Alcohol use disorder, in remission.       [facility] Adult Mental Health Day Treatment  TRACK: 2A    Telemedicine Visit: The patient's condition can be safely assessed and treated via synchronous audio and visual telemedicine encounter.      Reason for Telemedicine Visit: Services only offered telehealth    Originating Site (Patient Location): Patient's home    Distant Site (Provider Location): [facility]: [facility] [place]    Consent:  The patient/guardian has verbally consented to: the potential risks and benefits of telemedicine (video visit) versus in person care; bill my insurance or make self-payment for services provided; and responsibility for payment of non-covered services.     Mode of Communication:  Video Conference via zoom    As the provider I attest to compliance with applicable laws and regulations related to telemedicine.      NUMBER OF PARTICIPANTS: 4          Data:    Session content: At the start of this group, patients were invited to check in by identifying themselves, describing their current emotional status, and identifying issues to address in this group.   Area(s) of treatment focus addressed in this session included Symptom Management and Personal Safety.  Pt reports feeling good today and  "is grateful for her increased stability. Pt identifies progress since starting the program and identifies experiencing a \"very dark place\". She is looking forward to weekend with extended family which includes hiking and being outdoors. She wants to be careful \"not to overdo it\". Pt feels proud of sharing her feelings in group and is grateful for her daughters. Denies safety concerns.    Therapeutic Interventions/Treatment Strategies:  Psychotherapist offered support, feedback and validation and reinforced use of skills. Treatment modalities used include Cognitive Behavioral Therapy. Interventions include Symptoms Management: Promoted understanding of their diagnoses and how it impacts their functioning.    Assessment:    Patient response:   Patient responded to session by accepting feedback, giving feedback, listening, focusing on goals, being attentive and accepting support    Possible barriers to participation / learning include: and no barriers identified    Health Issues:   None reported       Substance Use Review:   Substance Use: cannabis .  and Last use: daily    Mental Status/Behavioral Observations  Appearance:   Appropriate   Eye Contact:   Good   Psychomotor Behavior: Normal   Attitude:   Cooperative   Orientation:   All  Speech   Rate / Production: Normal    Volume:  Normal   Mood:    Normal  Affect:    Appropriate   Thought Content:   Clear  Thought Form:  Coherent  Logical     Insight:    Good     Plan:     Safety Plan: No current safety concerns identified.  Recommended that patient call 911 or go to the local ED should there be a change in any of these risk factors.     Barriers to treatment: None identified    Patient Contracts (see media tab):  None    Substance Use: Not addressed in session     Continue or Discharge: Patient will continue in Adult Day Treatment (ADT)  as planned. Patient is likely to benefit from learning and using skills as they work toward the goals identified in their " treatment plan.      Natalie Magallon, [facility]  May 27, 2021

## 2021-05-27 NOTE — GROUP NOTE
Psychotherapy Group Note    PATIENT'S NAME: Mariusz Huntley  MRN:   [id number]  :   1985  ACCT. NUMBER: [id number]  DATE OF SERVICE: 21  START TIME: 10:00 AM  END TIME: 10:50 AM  FACILITATOR: Eric Alejandre LP  TOPIC: MH EBP Group: Relationship Skills  [facility] Adult Mental Health Day Treatment  TRACK: 2A    NUMBER OF PARTICIPANTS: 4    Telemedicine Visit: The patient's condition can be safely assessed and treated via synchronous audio and visual telemedicine encounter.      Reason for Telemedicine Visit: Services only offered telehealth    Originating Site (Patient Location): Patient's home    Distant Site (Provider Location): Provider Remote Setting    Consent:  The patient/guardian has verbally consented to: the potential risks and benefits of telemedicine (video visit) versus in person care; bill my insurance or make self-payment for services provided; and responsibility for payment of non-covered services.     Mode of Communication:  Video Conference via [employer]    As the provider I attest to compliance with applicable laws and regulations related to telemedicine.      Summary of Group / Topics Discussed:  Relationship Skills: Relationship Mapping: Patients identified different types of relationships they have in their life and examined if there is conflict or closeness, the degree of conflict or closeness, and the reason for conflict. The goal of this topic is to help patients gain awareness of the relationships they have with others, identify types of conflict in patients  lives and how they impact symptoms/functioning, and identify how they can improve relationships with relationship and interpersonal skills they have learned.     Patient Session Goals / Objectives:    Familiarized patients with awareness to the different types of relationships they may have with different people and substances in their lives    Discussed and practiced strategies to promote  healthier understanding of interpersonal relationships with a focus on awareness of conflict, the causes of conflict, and the ways to transform conflict    Discussed the use of substances and its impact on their relationships      Patient Participation / Response:  Fully participated with the group by sharing personal reflections / insights and openly received / provided feedback with other participants.    Demonstrated understanding of topics discussed through group discussion and participation    Treatment Plan:  Patient has a current master individualized treatment plan.  See Epic treatment plan for more information.    Eric Alejandre, LP

## 2021-05-27 NOTE — TELEPHONE ENCOUNTER
Phone call to [employer] to ask her to complete 2 scales sent on [employer] and update information for insurance authorization.    A message was left on her phone and in the [employer].    Sheryl Head D,  Licensed Clinical Psychologist

## 2021-06-01 ENCOUNTER — HOSPITAL ENCOUNTER (OUTPATIENT)
Dept: BEHAVIORAL HEALTH | Facility: CLINIC | Age: 36
End: 2021-06-01
Attending: PSYCHIATRY & NEUROLOGY
Payer: COMMERCIAL

## 2021-06-01 DIAGNOSIS — F43.10 PTSD (POST-TRAUMATIC STRESS DISORDER): ICD-10-CM

## 2021-06-01 DIAGNOSIS — F33.1 MAJOR DEPRESSIVE DISORDER, RECURRENT EPISODE, MODERATE (H): ICD-10-CM

## 2021-06-01 PROCEDURE — 90853 GROUP PSYCHOTHERAPY: CPT | Mod: 95 | Performed by: PSYCHOLOGIST

## 2021-06-01 PROCEDURE — 90853 GROUP PSYCHOTHERAPY: CPT | Mod: GT | Performed by: SOCIAL WORKER

## 2021-06-01 NOTE — GROUP NOTE
Psychotherapy Group Note  Telemedicine Visit: The patient's condition can be safely assessed and treated via synchronous audio and visual telemedicine encounter.    Reason for Telemedicine Visit: Patient has requested telehealth visit  Originating Site (Patient Location): Patient's home  Distant Site (Provider Location): [facility] Outpatient Setting: Adult Day Tx  Consent:  The patient/guardian has verbally consented to: the potential risks and benefits of telemedicine (video visit) versus in person care; bill my insurance or make self-payment for services provided; and responsibility for payment of non-covered services.   Mode of Communication:  Video Conference via [employer]  As the provider I attest to compliance with applicable laws and regulations related to telemedicine.    PATIENT'S NAME: Mariusz Huntley  MRN:   [id number]  :   1985  ACCT. NUMBER: [id number]  DATE OF SERVICE: 21  START TIME: 10:00 AM  END TIME: 10:50 AM  FACILITATOR: Milagros Meza PsyD  TOPIC: MH EBP Group: Relationship Skills  [facility] Adult Mental Health Day Treatment  TRACK: 2A    NUMBER OF PARTICIPANTS: 7    Summary of Group / Topics Discussed:  Relationship Skills: Conflict Resolution: Topic of conflict resolution in interpersonal communication was discussed. Patients received an overview of how communication skills during times of conflict impact symptoms and functioning. Patients discussed their current communication challenges and how this impacts their functioning. Patients also verbalized understanding of skills learned and practiced in session.     Patient Session Goals / Objectives:    Identified and discussed patient individual challenges with communication    Presented and practiced effective communication skills in session    Assisted patients in implementing more effective communication skills in their relationships      Patient Participation / Response:  Fully participated with the  group by sharing personal reflections / insights and openly received / provided feedback with other participants.    Identified / Expressed personal readiness to incorporate effective communication skills    Treatment Plan:  Patient has a current master individualized treatment plan.  See Epic treatment plan for more information.    Analia Sousa Psy., D,  Licensed Clinical Psychologist

## 2021-06-01 NOTE — GROUP NOTE
Process Group Note  Telemedicine Visit: The patient's condition can be safely assessed and treated via synchronous audio and visual telemedicine encounter.    Reason for Telemedicine Visit: Patient has requested telehealth visit  Originating Site (Patient Location): Patient's home  Distant Site (Provider Location): [facility] Outpatient Setting: Adult Day Tx  Consent:  The patient/guardian has verbally consented to: the potential risks and benefits of telemedicine (video visit) versus in person care; bill my insurance or make self-payment for services provided; and responsibility for payment of non-covered services.   Mode of Communication:  Video Conference via [employer]  As the provider I attest to compliance with applicable laws and regulations related to telemedicine.      PATIENT'S NAME: Mariusz Huntley  MRN:   [id number]  :   1985  ACCT. NUMBER: [id number]  DATE OF SERVICE: 21  START TIME:  9:00 AM  END TIME:  9:50 AM  FACILITATOR: Milagros Meza PsyD  TOPIC:  Process Group    Diagnoses:  296.32 Major Depressive Disorder, Moderate  309.81 PTSD  R/O Panic Disorder    Psychiatry: [facility] [place]  Therapy: Dr. Casper, [facility]  PCP:  ......      [facility] Adult Mental Health Day Treatment  TRACK: 2A    NUMBER OF PARTICIPANTS: 7          Data:    Session content: At the start of this group, patients were invited to check in by identifying themselves, describing their current emotional status, and identifying issues to address in this group.   Area(s) of treatment focus addressed in this session included Symptom Management, Personal Safety, Community Resources/Discharge Planning and Abstinence/Relapse Prevention.  Client reported being safe today.  Reported mood is depressed.     Goal for today is to attend therapy groups.  The client talked to the group about how she is planning to do things outside with her kids and it helps her feel  better. She reported being sober and taking prescribed medications. She reported that she would like to do family therapy, but doesn't think that her  will do it and thought that he might be open to doing individual therapy. She reported being interested in DBT programs in the [facility].  She reported problems with ongoing depression, feeling like a failure, low energy, problems with sleep, hopelessness, , feeling bad about herself, and finding little pleasure in doing things.  She reported ongoing worry about multiple issues, feeling on edge and anxious each day, restlessness, feeling irritable and annoyed and feeling fearfulness and a sense of doom. She reported arguments with her  and said that he wouldn't like to do family or couple's therapy. She reported that she will ask him about doing individual therapy. She talked to the group about DBT and her interest in that program. She reported problems with panic attacks and shortness of breath, shaking, trembling, light-headedness, headaches, loss of focus, feelings of doom, and that they occur each week. She reported being outside and exercising is helping her mood.     Therapeutic Interventions/Treatment Strategies:  Psychotherapist reinforced use of skills. Treatment modalities used include Cognitive Behavioral Therapy and Dialectical Behavioral Therapy. Interventions include Cognitive Restructuring:  Explored impact of ineffective thoughts / distortions on mood and activity and Facilitated recognition of the connection between negative thoughts and negative core beliefs, Coping Skills: Promoted understanding of how and when to apply grounding strategies to reduce distress and increase presence in the moment and Mindfulness: Facilitated discussion of when/how to use mindfulness skills to benefit general health, mental health symptoms, and stressors.    Assessment:    Patient response:   Patient responded to session by giving feedback, listening and  being attentive    Possible barriers to participation / learning include: severity of symptoms    Health Issues:   None reported       Substance Use Review:   Substance Use: No active concerns identified.    Mental Status/Behavioral Observations  Appearance:   Appropriate   Eye Contact:   Good   Psychomotor Behavior: Normal   Attitude:   Cooperative   Orientation:   All  Speech   Rate / Production: Normal    Volume:  Normal   Mood:    Anxious  Depressed  Sad   Affect:    Constricted   Thought Content:   Rumination  Thought Form:  Coherent  Logical     Insight:    Good     Plan:     Safety Plan: Recommended that patient call 911 or go to the local ED should there be a change in any of these risk factors.     Barriers to treatment: None identified    Patient Contracts (see media tab):  None    Substance Use: Provided encouragement towards sobriety     Continue or Discharge: Patient will continue in Adult Day Treatment (ADT)  as planned. Patient is likely to benefit from learning and using skills as they work toward the goals identified in their treatment plan.    Selene Head., D,  Licensed Clinical Psychologist    Milagros Meza PsyD  June 1, 2021

## 2021-06-01 NOTE — GROUP NOTE
Psychotherapy Group Note    PATIENT'S NAME: Mariusz Huntley  MRN:   [id number]  :   1985  ACCT. NUMBER: [id number]  DATE OF SERVICE: 21  START TIME: 11:00 AM  END TIME: 11:50 AM  FACILITATOR: Karine Negrete LICSW  TOPIC: MH EBP Group: Relationship Skills  [facility] Adult Mental Health Day Treatment  TRACK: 2A    NUMBER OF PARTICIPANTS: 5    Summary of Group / Topics Discussed:  Relationship Skills: Basic Communication: Patients were provided with a general overview of interpersonal communication skills and information about how communication skills impact symptoms and functioning. The goal of this topic is to help patients identify communication issues and gain skills to work towards healthier interpersonal communication. Patients reviewed their current awareness of communication issues and how communication skills impact relationships and functioning.      Patient Session Goals / Objectives:    Identified and discussed patients individual challenges with basic communication    Presented and practiced effective communication skills in session    Assisted patients in implementing more effective communication skills in their relationships  Telemedicine Visit: The patient's condition can be safely assessed and treated via synchronous audio and visual telemedicine encounter.      Reason for Telemedicine Visit: Services only offered telehealth    Originating Site (Patient Location): Patient's home    Distant Site (Provider Location): Provider Remote Setting    Consent:  The patient/guardian has verbally consented to: the potential risks and benefits of telemedicine (video visit) versus in person care; bill my insurance or make self-payment for services provided; and responsibility for payment of non-covered services.     Mode of Communication:  Video Conference via [employer]    As the provider I attest to compliance with applicable laws and regulations related to telemedicine.        Patient Participation / Response:  Fully participated with the group by sharing personal reflections / insights and openly received / provided feedback with other participants.    Identified / Expressed personal readiness to incorporate effective communication skills    Treatment Plan:  Patient has a current master individualized treatment plan.  See Epic treatment plan for more information.    Karine Negrete, Dorothe[facility] Dix Psychiatric CenterSW

## 2021-06-03 ENCOUNTER — HOSPITAL ENCOUNTER (OUTPATIENT)
Dept: BEHAVIORAL HEALTH | Facility: CLINIC | Age: 36
End: 2021-06-03
Attending: PSYCHIATRY & NEUROLOGY
Payer: COMMERCIAL

## 2021-06-03 DIAGNOSIS — F43.10 PTSD (POST-TRAUMATIC STRESS DISORDER): ICD-10-CM

## 2021-06-03 DIAGNOSIS — F33.1 MAJOR DEPRESSIVE DISORDER, RECURRENT EPISODE, MODERATE (H): ICD-10-CM

## 2021-06-03 PROCEDURE — 90853 GROUP PSYCHOTHERAPY: CPT | Mod: GT | Performed by: SOCIAL WORKER

## 2021-06-03 PROCEDURE — 90853 GROUP PSYCHOTHERAPY: CPT | Mod: GT | Performed by: PSYCHOLOGIST

## 2021-06-03 PROCEDURE — 90853 GROUP PSYCHOTHERAPY: CPT | Mod: 95 | Performed by: COUNSELOR

## 2021-06-03 NOTE — GROUP NOTE
Psychotherapy Group Note    PATIENT'S NAME: Mariusz Huntley  MRN:   [id number]  :   1985  ACCT. NUMBER: [id number]  DATE OF SERVICE: 21  START TIME: 11:00 AM  END TIME: 11:50 AM  FACILITATOR: Karine Negrete LICSW  TOPIC: MH EBP Group: Relationship Skills  [facility] Adult Mental Health Day Treatment  TRACK: 2A    NUMBER OF PARTICIPANTS: 4    Summary of Group / Topics Discussed:  Relationship Skills: Boundaries: Patients were provided with a general overview of interpersonal boundaries and how lack of boundaries relates to symptoms and functioning. The purpose is to help patients identify boundary issues and gain awareness and skills to work towards healthier interpersonal boundaries. Current awareness of healthy boundary characteristics and barriers to establishing healthy boundaries were discussed.    Patient Session Goals / Objectives:    Familiarized patients with the concept of interpersonal boundaries and their characteristics    Discussed and practiced strategies to promote healthier interpersonal boundaries    Identified boundary issues and identified plan to improve boundaries  Telemedicine Visit: The patient's condition can be safely assessed and treated via synchronous audio and visual telemedicine encounter.      Reason for Telemedicine Visit: Services only offered telehealth    Originating Site (Patient Location): Patient's home    Distant Site (Provider Location): Provider Remote Setting    Consent:  The patient/guardian has verbally consented to: the potential risks and benefits of telemedicine (video visit) versus in person care; bill my insurance or make self-payment for services provided; and responsibility for payment of non-covered services.     Mode of Communication:  Video Conference via [employer]    As the provider I attest to compliance with applicable laws and regulations related to telemedicine.       Patient Participation / Response:  Fully  participated with the group by sharing personal reflections / insights and openly received / provided feedback with other participants.    Identified / Expressed personal readiness to incorporate effective communication skills    Treatment Plan:  Patient has a current master individualized treatment plan.  See Epic treatment plan for more information.    Karine Negrete, [facility] HospitalSW

## 2021-06-03 NOTE — ADDENDUM NOTE
Encounter addended by: Milagros Meza PsyD on: 6/3/2021 4:17 PM   Actions taken: Order Reconciliation Section accessed

## 2021-06-03 NOTE — GROUP NOTE
Process Group Note  Telemedicine Visit: The patient's condition can be safely assessed and treated via synchronous audio and visual telemedicine encounter.    Reason for Telemedicine Visit: Patient has requested telehealth visit  Originating Site (Patient Location): Patient's home  Distant Site (Provider Location): [facility] Outpatient Setting: Adult Day Tx  Consent:  The patient/guardian has verbally consented to: the potential risks and benefits of telemedicine (video visit) versus in person care; bill my insurance or make self-payment for services provided; and responsibility for payment of non-covered services.   Mode of Communication:  Video Conference via [employer]  As the provider I attest to compliance with applicable laws and regulations related to telemedicine.      PATIENT'S NAME: Mariusz Huntley  MRN:   [id number]  :   1985  ACCT. NUMBER: [id number]  DATE OF SERVICE: 21  START TIME: 10:00 AM  END TIME: 10:50 AM  FACILITATOR: Milagros Meza PsyD  TOPIC:  Process Group    Diagnoses:  296.32 Major Depressive Disorder, Moderate  309.81 PTSD  R/O Panic Disorder    Psychiatry: [facility]  Therapy: Dr. Casper, [facility]  PCP:  ......      [facility] Adult Mental Health Day Treatment  TRACK: 2A    NUMBER OF PARTICIPANTS: 4          Data:    Session content: At the start of this group, patients were invited to check in by identifying themselves, describing their current emotional status, and identifying issues to address in this group.   Area(s) of treatment focus addressed in this session included Symptom Management, Personal Safety and Community Resources/Discharge Planning.  Client reported being safe today.  Reported mood is depressed.     Goal for today is to attend therapy groups.  The client talked to the group about how she had her  birthday party yesterday and her family, friends, including her  came and they had a  nice time. She reported that she is nervous about talking to her boss about returning to work and the group validated her anxiety. She also reported that she will check on DBT groups covered by her insurance.      Therapeutic Interventions/Treatment Strategies:  Psychotherapist reinforced use of skills. Treatment modalities used include Cognitive Behavioral Therapy and Dialectical Behavioral Therapy. Interventions include Coping Skills: Reviewed patients current calming practices and discussed a more formal way of practicing and accessing skills, Relapse Prevention: Coached on skills to manage factors that contribute to relapse, Mindfulness: Encouraged a plan to use mindfulness skills in daily life and Symptoms Management: Promoted understanding of their diagnoses and how it impacts their functioning.    Assessment:    Patient response:   Patient responded to session by listening, focusing on goals and accepting support    Possible barriers to participation / learning include: severity of symptoms    Health Issues:   None reported       Substance Use Review:   Substance Use: No active concerns identified.    Mental Status/Behavioral Observations  Appearance:   Appropriate   Eye Contact:   Good   Psychomotor Behavior: Normal   Attitude:   Cooperative   Orientation:   All  Speech   Rate / Production: Normal    Volume:  Normal   Mood:    Anxious  Depressed  Sad   Affect:    Constricted   Thought Content:   Rumination  Thought Form:  Coherent  Logical     Insight:    Good     Plan:     Safety Plan: Recommended that patient call 911 or go to the local ED should there be a change in any of these risk factors.     Barriers to treatment: None identified    Patient Contracts (see media tab):  None    Substance Use: Provided encouragement towards sobriety     Continue or Discharge: Patient will continue in Adult Day Treatment (ADT)  as planned. Patient is likely to benefit from learning and using skills as they work toward  the goals identified in their treatment plan.    Selene Head., FEDE,  Licensed Clinical Psychologist    Milagros Meza PsyD  Vivien 3, 2021

## 2021-06-03 NOTE — GROUP NOTE
Psychotherapy Group Note    PATIENT'S NAME: Mariusz Huntley  MRN:   [id number]  :   1985  ACCT. NUMBER: [id number]  DATE OF SERVICE: 21  START TIME:  9:00 AM  END TIME:  9:50 AM  FACILITATOR: Zuri Armando LPCC  TOPIC: MH EBP Group: Relationship Skills  [facility] Adult Mental Health Day Treatment  TRACK: 2A    NUMBER OF PARTICIPANTS: 4    Summary of Group / Topics Discussed:  Relationship Skills: Conflict Resolution: Topic of conflict resolution in interpersonal communication was discussed. Patients received an overview of how communication skills during times of conflict impact symptoms and functioning. Patients discussed their current communication challenges and how this impacts their functioning. Patients also verbalized understanding of skills learned and practiced in session.     Patient Session Goals / Objectives:    Identified and discussed patient individual challenges with communication    Presented and practiced effective communication skills in session    Assisted patients in implementing more effective communication skills in their relationships    Telemedicine Visit: The patient's condition can be safely assessed and treated via synchronous audio and visual telemedicine encounter.      Reason for Telemedicine Visit: Services only offered telehealth and due to COVID-19    Originating Site (Patient Location): Patient's home    Distant Site (Provider Location): Provider Remote Setting    Consent:  The patient/guardian has verbally consented to: the potential risks and benefits of telemedicine (video visit) versus in person care; bill my insurance or make self-payment for services provided; and responsibility for payment of non-covered services.     Mode of Communication:  Video Conference via [employer]    As the provider I attest to compliance with applicable laws and regulations related to telemedicine.        Patient Participation / Response:  Fully participated with the  group by sharing personal reflections / insights and openly received / provided feedback with other participants.    Demonstrated understanding of topics discussed through group discussion and participation, Demonstrated understanding of relationship skills and communication skills and Identified / Expressed personal readiness to incorporate effective communication skills    Treatment Plan:  Patient has a current master individualized treatment plan.  See Epic treatment plan for more information.    Zuri Armando, LPCC

## 2021-06-07 ENCOUNTER — HOSPITAL ENCOUNTER (OUTPATIENT)
Dept: BEHAVIORAL HEALTH | Facility: CLINIC | Age: 36
End: 2021-06-07
Attending: PSYCHIATRY & NEUROLOGY
Payer: COMMERCIAL

## 2021-06-07 DIAGNOSIS — F33.1 MAJOR DEPRESSIVE DISORDER, RECURRENT EPISODE, MODERATE (H): ICD-10-CM

## 2021-06-07 DIAGNOSIS — F43.10 PTSD (POST-TRAUMATIC STRESS DISORDER): ICD-10-CM

## 2021-06-07 PROCEDURE — 90853 GROUP PSYCHOTHERAPY: CPT | Mod: 95 | Performed by: PSYCHOLOGIST

## 2021-06-07 PROCEDURE — 90853 GROUP PSYCHOTHERAPY: CPT | Mod: GT | Performed by: PSYCHOLOGIST

## 2021-06-07 PROCEDURE — 90853 GROUP PSYCHOTHERAPY: CPT | Mod: GT | Performed by: SOCIAL WORKER

## 2021-06-07 NOTE — GROUP NOTE
Psychotherapy Group Note  Telemedicine Visit: The patient's condition can be safely assessed and treated via synchronous audio and visual telemedicine encounter.    Reason for Telemedicine Visit: Patient has requested telehealth visit  Originating Site (Patient Location): Patient's home  Distant Site (Provider Location): [facility] Outpatient Setting: Adult Day Tx  Consent:  The patient/guardian has verbally consented to: the potential risks and benefits of telemedicine (video visit) versus in person care; bill my insurance or make self-payment for services provided; and responsibility for payment of non-covered services.   Mode of Communication:  Video Conference via [employer]  As the provider I attest to compliance with applicable laws and regulations related to telemedicine.    PATIENT'S NAME: Mariusz Huntley  MRN:   [id number]  :   1985  ACCT. NUMBER: [id number]  DATE OF SERVICE: 21  START TIME: 10:00 AM  END TIME: 10:50 AM  FACILITATOR: Milagros Meza PsyD  TOPIC: MH EBP Group: Self-Awareness  [facility] Adult Mental Health Day Treatment  TRACK: 2A      NUMBER OF PARTICIPANTS: 6    Summary of Group / Topics Discussed:  Self-Awareness: Self-Compassion: Patients received overview of key concepts in developing self-compassion. Patients discussed mindfulness, self-kindness, and finding common humanity. Patients identified their current approach to problems in their lives and learned skills for increasing self-compassion. Patients identified ways they can put self-compassion skills into practice and problem solve barriers to application of skills.     Patient Session Goals / Objectives:    [place] components of self-compassion    Identify ways to practice self-compassion in daily life    Problem solve barriers to self-compassion practice      Patient Participation / Response:  Fully participated with the group by sharing personal reflections / insights and openly received /  provided feedback with other participants.    Demonstrated understanding of topics discussed through group discussion and participation    Treatment Plan:  Patient has a current master individualized treatment plan.  See Epic treatment plan for more information.    Analia Sousa Psy., D,  Licensed Clinical Psychologist

## 2021-06-07 NOTE — GROUP NOTE
Psychotherapy Group Note    PATIENT'S NAME: Mariusz Huntley  MRN:   [id number]  :   1985  ACCT. NUMBER: [id number]  DATE OF SERVICE: 21  START TIME: 11:00 AM  END TIME: 11:50 AM  FACILITATOR: Karine Negrete LICSW  TOPIC: MH EBP Group: Self-Awareness  [facility] Adult Mental Health Day Treatment  TRACK: 2A    NUMBER OF PARTICIPANTS: 6    Summary of Group / Topics Discussed:  Self-Awareness: Values: Patients identified personal values by examining development of their current values and how their values influence their daily functioning and life choices. Patients explored the impact of their values on their thoughts, feelings, and actions. Patients discussed definition of personal values and how they develop and change over time. The goal is to help patients reconcile value conflicts and achieve balance and flexibility to improve mood and daily functioning.     Patient Session Goals / Objectives:    Examined development of values and impact of values on functioning    Identified and prioritized important values related to current well-being     Identified strategies to change or enhance values to positively impact symptoms    Assisted patients to find ways to adapt functioning to better fit their values  Telemedicine Visit: The patient's condition can be safely assessed and treated via synchronous audio and visual telemedicine encounter.      Reason for Telemedicine Visit: Patient has requested telehealth visit    Originating Site (Patient Location): Patient's home    Distant Site (Provider Location): Provider Remote Setting    Consent:  The patient/guardian has verbally consented to: the potential risks and benefits of telemedicine (video visit) versus in person care; bill my insurance or make self-payment for services provided; and responsibility for payment of non-covered services.     Mode of Communication:  Video Conference via [employer]    As the provider I attest to  compliance with applicable laws and regulations related to telemedicine.       Patient Participation / Response:  Fully participated with the group by sharing personal reflections / insights and openly received / provided feedback with other participants.    Demonstrated understanding of values, strengths, and challenges to learn about themselves    Treatment Plan:  Patient has a current master individualized treatment plan.  See Epic treatment plan for more information.    Karine Negrete, LICSW

## 2021-06-07 NOTE — GROUP NOTE
Process Group Note  Telemedicine Visit: The patient's condition can be safely assessed and treated via synchronous audio and visual telemedicine encounter.    Reason for Telemedicine Visit: Patient has requested telehealth visit  Originating Site (Patient Location): Patient's home  Distant Site (Provider Location): [facility] Outpatient Setting: Adult Day Tx  Consent:  The patient/guardian has verbally consented to: the potential risks and benefits of telemedicine (video visit) versus in person care; bill my insurance or make self-payment for services provided; and responsibility for payment of non-covered services.   Mode of Communication:  Video Conference via [employer]  As the provider I attest to compliance with applicable laws and regulations related to telemedicine.    PATIENT'S NAME: Mariusz Huntley  MRN:   [id number]  :   1985  ACCT. NUMBER: [id number]  DATE OF SERVICE: 21  START TIME:  9:00 AM  END TIME:  9:50 AM  FACILITATOR: Milagros Meza PsyD  TOPIC:  Process Group    Diagnoses:  296.32 Major Depressive Disorder, Moderate  309.81 PTSD  R/O Panic Disorder    Psychiatry: [facility] [place]  Therapy: Dr. Casper, [facility]  PCP:  ......      [facility] Adult Mental Health Day Treatment  TRACK: 2A    NUMBER OF PARTICIPANTS: 6          Data:    Session content: At the start of this group, patients were invited to check in by identifying themselves, describing their current emotional status, and identifying issues to address in this group.   Area(s) of treatment focus addressed in this session included Symptom Management, Personal Safety and Community Resources/Discharge Planning.  Client reported being safe today.  Reported mood is anxious.    Goal for today is to attend therapy groups. The client talked to the group about how she was outside with her kids on the weekend and enjoyed herself. She reported that she has an individual  therapist appointment tomorrow and will talk to her about returning to work and how to be assertive about what she needs.       Therapeutic Interventions/Treatment Strategies:  Psychotherapist offered support, feedback and validation and reinforced use of skills. Treatment modalities used include Cognitive Behavioral Therapy and Dialectical Behavioral Therapy. Interventions include Cognitive Restructuring:  Assisted patient in identifying new neutral/positive core beliefs, Relapse Prevention: Coached on skills to manage factors that contribute to relapse and Mindfulness: Encouraged a plan to use mindfulness skills in daily life.    Assessment:    Patient response:   Patient responded to session by giving feedback, listening and being attentive    Possible barriers to participation / learning include: severity of symptoms    Health Issues:   None reported       Substance Use Review:   Substance Use: No active concerns identified.    Mental Status/Behavioral Observations  Appearance:   Appropriate   Eye Contact:   Good   Psychomotor Behavior: Normal   Attitude:   Cooperative   Orientation:   All  Speech   Rate / Production: Normal    Volume:  Normal   Mood:    Depressed   Affect:    Constricted   Thought Content:   Rumination  Thought Form:  Coherent  Logical     Insight:    Good     Plan:     Safety Plan: Recommended that patient call 911 or go to the local ED should there be a change in any of these risk factors.     Barriers to treatment: None identified    Patient Contracts (see media tab):  None    Substance Use: Provided encouragement towards sobriety     Continue or Discharge: Patient will continue in Adult Day Treatment (ADT)  as planned. Patient is likely to benefit from learning and using skills as they work toward the goals identified in their treatment plan.      Milagros Meza PsyD  June 7, 2021  Sheryl Head D,  Licensed Clinical Psychologist

## 2021-06-08 ENCOUNTER — HOSPITAL ENCOUNTER (OUTPATIENT)
Dept: BEHAVIORAL HEALTH | Facility: CLINIC | Age: 36
End: 2021-06-08
Attending: PSYCHIATRY & NEUROLOGY
Payer: COMMERCIAL

## 2021-06-08 DIAGNOSIS — F43.10 PTSD (POST-TRAUMATIC STRESS DISORDER): ICD-10-CM

## 2021-06-08 DIAGNOSIS — F33.1 MAJOR DEPRESSIVE DISORDER, RECURRENT EPISODE, MODERATE (H): ICD-10-CM

## 2021-06-08 PROCEDURE — 90853 GROUP PSYCHOTHERAPY: CPT | Mod: GT | Performed by: PSYCHOLOGIST

## 2021-06-08 PROCEDURE — 90853 GROUP PSYCHOTHERAPY: CPT | Mod: GT | Performed by: SOCIAL WORKER

## 2021-06-08 NOTE — GROUP NOTE
Psychotherapy Group Note    PATIENT'S NAME: Mariusz Huntley  MRN:   [id number]  :   1985  ACCT. NUMBER: [id number]  DATE OF SERVICE: 21  START TIME: 11:00 AM  END TIME: 11:50 AM  FACILITATOR: Karine Negrete LICSW  TOPIC: MH EBP Group: Coping Skills  [facility] Adult Mental Health Day Treatment  TRACK: 2A    NUMBER OF PARTICIPANTS: 6    Summary of Group / Topics Discussed:  Coping Skills: Additional Coping Skills:  Patients discussed and practiced skills to manage worry.  Reviewed the benefits of applying the aforementioned coping strategies.  Patients explored how these strategies might be applied to daily stressors or distressing situations.    Patient Session Goals / Objectives:    Understand the purpose and benefits of applying worry management coping strategies    Identify preferred strategies to try.    Assess what skills client is already using.     Address barriers to utilizing coping skills when in distress.  Telemedicine Visit: The patient's condition can be safely assessed and treated via synchronous audio and visual telemedicine encounter.      Reason for Telemedicine Visit: Services only offered telehealth    Originating Site (Patient Location): Patient's home    Distant Site (Provider Location): Provider Remote Setting    Consent:  The patient/guardian has verbally consented to: the potential risks and benefits of telemedicine (video visit) versus in person care; bill my insurance or make self-payment for services provided; and responsibility for payment of non-covered services.     Mode of Communication:  Video Conference via [employer]    As the provider I attest to compliance with applicable laws and regulations related to telemedicine.         Patient Participation / Response:  Fully participated with the group by sharing personal reflections / insights and openly received / provided feedback with other participants.    Demonstrated knowledge of when to consider  using a variety of coping skills in daily life    Treatment Plan:  Patient has a current master individualized treatment plan.  See Epic treatment plan for more information.    Karine Negrete, [facility] Center[facility]

## 2021-06-08 NOTE — GROUP NOTE
Psychotherapy Group Note  Telemedicine Visit: The patient's condition can be safely assessed and treated via synchronous audio and visual telemedicine encounter.    Reason for Telemedicine Visit: Patient has requested telehealth visit  Originating Site (Patient Location): Patient's home  Distant Site (Provider Location): [facility] Outpatient Setting: Adult Day Tx  Consent:  The patient/guardian has verbally consented to: the potential risks and benefits of telemedicine (video visit) versus in person care; bill my insurance or make self-payment for services provided; and responsibility for payment of non-covered services.   Mode of Communication:  Video Conference via [employer]  As the provider I attest to compliance with applicable laws and regulations related to telemedicine.    PATIENT'S NAME: Mariusz Huntley  MRN:   [id number]  :   1985  ACCT. NUMBER: [id number]  DATE OF SERVICE: 21  START TIME: 10:00 AM  END TIME: 10:50 AM  FACILITATOR: Milagros Meza PsyD  TOPIC: MH EBP Group: Self-Awareness  [facility] Adult Mental Health Day Treatment  TRACK: 2A    NUMBER OF PARTICIPANTS: 6    Summary of Group / Topics Discussed:  Self-Awareness: Self-Compassion: Patients received overview of key concepts in developing self-compassion. Patients discussed mindfulness, self-kindness, and finding common humanity. Patients identified their current approach to problems in their lives and learned skills for increasing self-compassion. Patients identified ways they can put self-compassion skills into practice and problem solve barriers to application of skills.     Patient Session Goals / Objectives:    [place] components of self-compassion    Identify ways to practice self-compassion in daily life    Problem solve barriers to self-compassion practice      Patient Participation / Response:  Fully participated with the group by sharing personal reflections / insights and openly received / provided  feedback with other participants.    Identified / Expressed readiness to act intentionally, increase self-compassion, promote personal growth    Treatment Plan:  Patient has a current master individualized treatment plan.  See Epic treatment plan for more information.    Analia Sousa Psy., D,  Licensed Clinical Psychologist

## 2021-06-08 NOTE — GROUP NOTE
Process Group Note  Telemedicine Visit: The patient's condition can be safely assessed and treated via synchronous audio and visual telemedicine encounter.    Reason for Telemedicine Visit: Patient has requested telehealth visit  Originating Site (Patient Location): Patient's home  Distant Site (Provider Location): [facility] Outpatient Setting: Adult Day Tx  Consent:  The patient/guardian has verbally consented to: the potential risks and benefits of telemedicine (video visit) versus in person care; bill my insurance or make self-payment for services provided; and responsibility for payment of non-covered services.   Mode of Communication:  Video Conference via [employer]  As the provider I attest to compliance with applicable laws and regulations related to telemedicine.    PATIENT'S NAME: Mariusz Huntley  MRN:   [id number]  :   1985  ACCT. NUMBER: [id number]  DATE OF SERVICE: 21  START TIME:  9:00 AM  END TIME:  9:50 AM  FACILITATOR: Milagros Meza PsyD  TOPIC:  Process Group    Diagnoses:  296.32 Major Depressive Disorder, Moderate  309.81 PTSD  R/O Panic Disorder    Psychiatry: [facility] [place]  Therapy: Dr. Casper, [facility]  PCP:  ......      [facility] Adult Mental Health Day Treatment  TRACK: 2A    NUMBER OF PARTICIPANTS: 7          Data:    Session content: At the start of this group, patients were invited to check in by identifying themselves, describing their current emotional status, and identifying issues to address in this group.   Area(s) of treatment focus addressed in this session included Symptom Management, Personal Safety and Community Resources/Discharge Planning.  Client reported being safe today.  Reported mood is anxious and angry.     Goal for today is to attend therapy groups.  The client talked to the group about how she was outside with her kids and enjoyed the hot weather. She reported that       Therapeutic  Interventions/Treatment Strategies:  Psychotherapist offered support, feedback and validation. Treatment modalities used include Cognitive Behavioral Therapy and Dialectical Behavioral Therapy. Interventions include Cognitive Restructuring:  Explored impact of ineffective thoughts / distortions on mood and activity, Mindfulness: Facilitated discussion of when/how to use mindfulness skills to benefit general health, mental health symptoms, and stressors and Symptoms Management: Promoted understanding of their diagnoses and how it impacts their functioning.    Assessment:    Patient response:   Patient responded to session by giving feedback, listening and being attentive    Possible barriers to participation / learning include: severity of symptoms    Health Issues:   None reported       Substance Use Review:   Substance Use: No active concerns identified.    Mental Status/Behavioral Observations  Appearance:   Appropriate   Eye Contact:   Good   Psychomotor Behavior: Normal   Attitude:   Cooperative   Orientation:   All  Speech   Rate / Production: Normal    Volume:  Normal   Mood:    Anxious  Depressed   Affect:    Constricted   Thought Content:   Rumination  Thought Form:  Coherent  Logical     Insight:    Good     Plan:     Safety Plan: Recommended that patient call 911 or go to the local ED should there be a change in any of these risk factors.     Barriers to treatment: None identified    Patient Contracts (see media tab):  None    Substance Use: Provided encouragement towards sobriety     Continue or Discharge: Patient will continue in Adult Day Treatment (ADT)  as planned. Patient is likely to benefit from learning and using skills as they work toward the goals identified in their treatment plan.      Milagros Meza PsyD  June 8, 2021  Sheryl Head D,  Licensed Clinical Psychologist

## 2021-06-10 ENCOUNTER — HOSPITAL ENCOUNTER (OUTPATIENT)
Dept: BEHAVIORAL HEALTH | Facility: CLINIC | Age: 36
End: 2021-06-10
Attending: PSYCHIATRY & NEUROLOGY
Payer: COMMERCIAL

## 2021-06-10 DIAGNOSIS — F33.1 MAJOR DEPRESSIVE DISORDER, RECURRENT EPISODE, MODERATE (H): ICD-10-CM

## 2021-06-10 DIAGNOSIS — F43.10 PTSD (POST-TRAUMATIC STRESS DISORDER): ICD-10-CM

## 2021-06-10 PROCEDURE — 90853 GROUP PSYCHOTHERAPY: CPT | Mod: GT | Performed by: PSYCHOLOGIST

## 2021-06-10 PROCEDURE — 90853 GROUP PSYCHOTHERAPY: CPT | Mod: 95 | Performed by: SOCIAL WORKER

## 2021-06-10 NOTE — ADDENDUM NOTE
Encounter addended by: Milagros Meza PsyD on: 6/10/2021 12:34 PM   Actions taken: Clinical Note Signed

## 2021-06-10 NOTE — GROUP NOTE
Process Group Note  Telemedicine Visit: The patient's condition can be safely assessed and treated via synchronous audio and visual telemedicine encounter.    Reason for Telemedicine Visit: Patient has requested telehealth visit  Originating Site (Patient Location): Patient's home  Distant Site (Provider Location): [facility] Outpatient Setting: Adult Day Tx  Consent:  The patient/guardian has verbally consented to: the potential risks and benefits of telemedicine (video visit) versus in person care; bill my insurance or make self-payment for services provided; and responsibility for payment of non-covered services.   Mode of Communication:  Video Conference via [employer]  As the provider I attest to compliance with applicable laws and regulations related to telemedicine.    PATIENT'S NAME: Mariusz Huntley  MRN:   [id number]  :   1985  ACCT. NUMBER: [id number]  DATE OF SERVICE: 6/10/21  START TIME:  9:00 AM  END TIME:  9:50 AM  FACILITATOR: Milagros Meza PsyD  TOPIC:  Process Group    Diagnoses:    296.32 Major Depressive Disorder, Moderate  309.81 PTSD  R/O Panic Disorder    Psychiatry: [facility] [place]  Therapy: Dr. Casper, [facility]  PCP:  ......    [facility] Adult Mental Health Day Treatment  TRACK: 2A    NUMBER OF PARTICIPANTS: 6          Data:    Session content: At the start of this group, patients were invited to check in by identifying themselves, describing their current emotional status, and identifying issues to address in this group.   Area(s) of treatment focus addressed in this session included Symptom Management, Personal Safety and Community Resources/Discharge Planning.  Client reported being safe today.  Reported mood is anxious.   Goal for today is to attend therapy groups. The client talked to the group about how she will take her daughter to the mall and has anxiety about being at a large public place. The group validated her  feelings and advised her to focus on getting steps and go for a walk in the mall for exercise. She reported panic attacks when in large public places and was worried that she would get some symptoms. She reported problems with nightmares of past trauma, and said that she is working on them with her therapist, as well.   She reported that she is planning a trip to [place] with her family, and talked about her disability paperwork, her psychiatrist , and that she may work at another location that she believes is safer than the previous one.       Therapeutic Interventions/Treatment Strategies:  Psychotherapist reinforced use of skills. Treatment modalities used include Cognitive Behavioral Therapy and Dialectical Behavioral Therapy. Interventions include Relapse Prevention: Assisted patient in identifying the challenges and barriers to participation and attendance to support groups/community resources, Symptoms Management: Promoted understanding of their diagnoses and how it impacts their functioning and Emotions Management:  Discussed barriers to emotional regulation.    Assessment:    Patient response:   Patient responded to session by listening, focusing on goals and appearing alert    Possible barriers to participation / learning include: severity of symptoms    Health Issues:   None reported       Substance Use Review:   Substance Use: No active concerns identified.    Mental Status/Behavioral Observations  Appearance:   Appropriate   Eye Contact:   Good   Psychomotor Behavior: Normal   Attitude:   Cooperative   Orientation:   All  Speech   Rate / Production: Normal    Volume:  Normal   Mood:    Anxious  Sad   Affect:    Constricted   Thought Content:   Rumination  Thought Form:  Coherent  Logical     Insight:    Good     Plan:     Safety Plan: Recommended that patient call 911 or go to the local ED should there be a change in any of these risk factors.     Barriers to treatment: None  identified    Patient Contracts (see media tab):  None    Substance Use: Provided encouragement towards sobriety     Continue or Discharge: Patient will continue in Adult Day Treatment (ADT)  as planned. Patient is likely to benefit from learning and using skills as they work toward the goals identified in their treatment plan.      Milagros Meza PsyD  Vivien 10, 2021  Selene Head., FEDE,  Licensed Clinical Psychologist

## 2021-06-10 NOTE — GROUP NOTE
Psychoeducation Group Note    PATIENT'S NAME: Mariusz Huntley  MRN:   [id number]  :   1985  ACCT. NUMBER: [id number]  DATE OF SERVICE: 6/10/21  START TIME: 11:00 AM  END TIME: 11:50 AM  FACILITATOR: Karine Negrete LICSW  TOPIC:  RN Group: Mind/Body Practice & Complementary   [facility] [place] [facility] Day Treatment  TRACK: 2A    NUMBER OF PARTICIPANTS: 6    Summary of Group / Topics Discussed:  Mind/Body Practice & Complementary Therapies:  Complementary Therapies: Patients were educated on a variety of complementary therapies that can be used in conjunction with psychotherapy, pharmacotherapy, & other treatments or independently to promote holistic wellness and symptom reduction. Complementary therapies addressed included: acupuncture, chiropractic, meditation, aromatherapy, massage, spiritual/energy healing, yoga & other mind/body practices.    Patient Session Goals / Objectives:  ? Defined what a complementary therapy is and why it can be beneficial in conjunction or independently of other treatment modalities   ? Explained how the various complementary therapies are practiced and what are the expected effects  ? Practiced complementary therapy experiential   Telemedicine Visit: The patient's condition can be safely assessed and treated via synchronous audio and visual telemedicine encounter.      Reason for Telemedicine Visit: Services only offered telehealth    Originating Site (Patient Location): Patient's home    Distant Site (Provider Location): Provider Remote Setting    Consent:  The patient/guardian has verbally consented to: the potential risks and benefits of telemedicine (video visit) versus in person care; bill my insurance or make self-payment for services provided; and responsibility for payment of non-covered services.     Mode of Communication:  Video Conference via [employer]    As the provider I attest to compliance with applicable laws and regulations  related to telemedicine.       Patient Participation / Response:  Fully participated with the group by sharing personal reflections / insights and openly received / provided feedback with other participants.    Demonstrated understanding of topics discussed through group discussion and participation    Treatment Plan:  Patient has a current master individualized treatment plan.  See Epic treatment plan for more information.    Karine Negrete, [facility] HospitalSW

## 2021-06-10 NOTE — PROGRESS NOTES
Acknowledgement of Current Treatment Plan       I have reviewed my treatment plan with my therapist / counselor on 6/10/2021  .   I agree with the plan as it is written in the electronic health record. (2A)    Name:      Signature:  Mariusz Huntley Unable to sign due to COVID19       Dr Edwardo Urena MD  Psychiatrist    Selene Head.FEDE,  Licensed Psychologist   Sheryl Head, D,  Licensed Clinical Psychologist

## 2021-06-11 NOTE — PROGRESS NOTES
Patient Active Problem List   Diagnosis     Anxiety     PTSD (post-traumatic stress disorder)     Major depressive disorder, recurrent episode, moderate (H)     Posttraumatic stress disorder     Suicidal ideation       Current Outpatient Medications:      acetaminophen (TYLENOL) 325 MG tablet, Take 2 tablets (650 mg) by mouth every 4 hours as needed for mild pain (to moderate pain), Disp:  , Rfl:      cyclobenzaprine (FLEXERIL) 10 MG tablet, Take 1 tablet (10 mg) by mouth 3 times daily as needed for muscle spasms, Disp: 60 tablet, Rfl: 0     EPINEPHrine (ANY BX GENERIC EQUIV) 0.3 MG/0.3ML injection 2-pack, Inject 0.3 mg into the muscle as needed for anaphylaxis, Disp: , Rfl:      escitalopram (LEXAPRO) 20 MG tablet, Take 1 tablet (20 mg) by mouth daily, Disp: 30 tablet, Rfl: 1     gabapentin (NEURONTIN) 800 MG tablet, Take 1 tablet (800 mg) by mouth 3 times daily, Disp: 90 tablet, Rfl: 1     hydrOXYzine (ATARAX) 50 MG tablet, Take 1 tablet (50 mg) by mouth every 4 hours as needed for anxiety, Disp: 90 tablet, Rfl: 1     ibuprofen (ADVIL/MOTRIN) 600 MG tablet, Take 1 tablet (600 mg) by mouth every 6 hours as needed for moderate pain, Disp:  , Rfl:      melatonin 3 MG tablet, Take 1-2 tablets (3-6 mg) by mouth nightly as needed for sleep, Disp: 60 tablet, Rfl: 1     Multiple Vitamins-Minerals (MULTIVITAMIN GUMMIES ADULT PO), Take 4 tablets by mouth daily, Disp: , Rfl:      ondansetron (ZOFRAN-ODT) 4 MG ODT tab, Take 4 mg by mouth every 6 hours as needed for nausea or vomiting, Disp: , Rfl:      polyethylene glycol (MIRALAX) 17 GM/Dose powder, Take 17 g by mouth daily, Disp: , Rfl:      propranolol (INDERAL) 20 MG tablet, Take 1 tablet (20 mg) by mouth 2 times daily, Disp: 60 tablet, Rfl: 1     senna-docusate (SENOKOT-S/PERICOLACE) 8.6-50 MG tablet, Take 1 tablet by mouth 2 times daily as needed for constipation, Disp:  , Rfl:      traZODone (DESYREL) 50 MG tablet, Take 1 tablet (50 mg) by mouth nightly as needed for  sleep (may repeat after 60 minutes), Disp: 30 tablet, Rfl: 1     Vitamin D3 (CHOLECALCIFEROL) 25 mcg (1000 units) tablet, Take 2 tablets (50 mcg) by mouth daily, Disp: 60 tablet, Rfl: 1  Psychiatry staffing: case discussed  Diagnosis:  As above;  Attending well, learning skills

## 2021-06-14 ENCOUNTER — HOSPITAL ENCOUNTER (OUTPATIENT)
Dept: BEHAVIORAL HEALTH | Facility: CLINIC | Age: 36
End: 2021-06-14
Attending: PSYCHIATRY & NEUROLOGY
Payer: COMMERCIAL

## 2021-06-14 DIAGNOSIS — F33.1 MAJOR DEPRESSIVE DISORDER, RECURRENT EPISODE, MODERATE (H): ICD-10-CM

## 2021-06-14 DIAGNOSIS — F43.10 PTSD (POST-TRAUMATIC STRESS DISORDER): ICD-10-CM

## 2021-06-14 PROCEDURE — 90853 GROUP PSYCHOTHERAPY: CPT | Mod: 95 | Performed by: SOCIAL WORKER

## 2021-06-14 PROCEDURE — 90853 GROUP PSYCHOTHERAPY: CPT | Mod: GT | Performed by: COUNSELOR

## 2021-06-14 NOTE — GROUP NOTE
Psychotherapy Group Note    PATIENT'S NAME: Mariusz Huntley  MRN:   [id number]  :   1985  ACCT. NUMBER: [id number]  DATE OF SERVICE: 21  START TIME: 11:00 AM  END TIME: 11:50 AM  FACILITATOR: Karine Negrete LICSW  TOPIC: MH EBP Group: Emotions Management  [facility] Adult Mental Health Day Treatment  TRACK: 2A    NUMBER OF PARTICIPANTS: 5    Summary of Group / Topics Discussed:  Emotions Management: Anger: Patients explored and shared personal experiences associated with feelings of anger.  Group explored how these feelings develop, what they mean to each individual, and how to increase acceptance and usefulness of these feelings.  Discussed anger as a  secondary  emotion and reviewed ways to manage anger and challenge associated cognitive distortions. Group members worked to contextualize these concepts and promote healing.     Patient Session Goals / Objectives:    Discuss and review definitions and personal views/experiences with anger    Explore how feelings of anger impact functioning    Understand and practice strategies to manage difficult emotions and move towards healing    Demonstrate understanding of the feelings of anger    Verbalize how these emotions have impacted their lives/functioning    Verbalize of knowledge gained and possible interventions to manage feelings    Telemedicine Visit: The patient's condition can be safely assessed and treated via synchronous audio and visual telemedicine encounter.      Reason for Telemedicine Visit: Services only offered telehealth    Originating Site (Patient Location): Patient's home    Distant Site (Provider Location): Provider Remote Setting    Consent:  The patient/guardian has verbally consented to: the potential risks and benefits of telemedicine (video visit) versus in person care; bill my insurance or make self-payment for services provided; and responsibility for payment of non-covered services.     Mode of  Communication:  Video Conference via [employer]    As the provider I attest to compliance with applicable laws and regulations related to telemedicine.       Patient Participation / Response:  Fully participated with the group by sharing personal reflections / insights and openly received / provided feedback with other participants.    Self-aware of experiences with difficult emotions, and strategies to employ to manage them    Treatment Plan:  Patient has a current master individualized treatment plan.  See Epic treatment plan for more information.    Karine Negrete, LICSW

## 2021-06-14 NOTE — GROUP NOTE
"Process Group Note    PATIENT'S NAME: Mariusz Huntley  MRN:   [id number]  :   1985  ACCT. NUMBER: [id number]  DATE OF SERVICE: 21  START TIME:  9:00 AM  END TIME:  9:50 AM  FACILITATOR: Zuri Armando LPCC  TOPIC:  Process Group    Diagnoses:     296.32 Major Depressive Disorder, Moderate  309.81 PTSD  R/O Panic Disorder    [facility] Day Treatment  TRACK: 2A    NUMBER OF PARTICIPANTS: 5    Telemedicine Visit: The patient's condition can be safely assessed and treated via synchronous audio and visual telemedicine encounter.      Reason for Telemedicine Visit: Services only offered telehealth and due to COVID-19    Originating Site (Patient Location): Patient's home    Distant Site (Provider Location): Provider Remote Setting    Consent:  The patient/guardian has verbally consented to: the potential risks and benefits of telemedicine (video visit) versus in person care; bill my insurance or make self-payment for services provided; and responsibility for payment of non-covered services.     Mode of Communication:  Video Conference via Zoom    As the provider I attest to compliance with applicable laws and regulations related to telemedicine.            Data:    Session content: At the start of this group, patients were invited to check in by identifying themselves, describing their current emotional status, and identifying issues to address in this group.   Area(s) of treatment focus addressed in this session included Symptom Management, Personal Safety and Community Resources/Discharge Planning.    Patient processed feeing \"pretty good today\" and positve experiences she had with her family over the weekend. Patient reported she met with her supervisor last week and was able to get a new job in a location closer to her home. Patient processed feeling accomplished after that conversation and after completeing yard work yesterday.     Therapeutic " Interventions/Treatment Strategies:  Psychotherapist offered support, feedback and validation and reinforced use of skills. Treatment modalities used include Motivational Interviewing and Cognitive Behavioral Therapy. Interventions include Coping Skills: Assisted patient in identifying 1-2 healthy distraction skills to reduce overall distress, Symptoms Management: Promoted understanding of their diagnoses and how it impacts their functioning and Emotions Management:  Discussed barriers to emotional regulation.    Assessment:    Patient response:   Patient responded to session by accepting feedback, giving feedback, listening, focusing on goals, being attentive and accepting support    Possible barriers to participation / learning include: and no barriers identified    Health Issues:   None reported       Substance Use Review:   Substance Use: No active concerns identified.    Mental Status/Behavioral Observations  Appearance:   Appropriate   Eye Contact:   Good   Psychomotor Behavior: Normal   Attitude:   Cooperative   Orientation:   All  Speech   Rate / Production: Normal/ Responsive Normal    Volume:  Normal   Mood:    Anxious  Depressed  Normal  Affect:    Appropriate   Thought Content:   Clear and Safety denies any current safety concerns including suicidal ideation, self-harm, and homicidal ideation  Thought Form:  Coherent  Logical     Insight:    Good     Plan:     Safety Plan: No current safety concerns identified.  Recommended that patient call 911 or go to the local ED should there be a change in any of these risk factors.     Barriers to treatment: None identified    Patient Contracts (see media tab):  None    Substance Use: Provided encouragement towards sobriety     Continue or Discharge: Patient will continue in Adult Day Treatment (ADT)  as planned. Patient is likely to benefit from learning and using skills as they work toward the goals identified in their treatment plan.      Zuri Armando,  [facility]  June 14, 2021

## 2021-06-14 NOTE — GROUP NOTE
Psychotherapy Group Note    PATIENT'S NAME: Mariusz Huntley  MRN:   [id number]  :   1985  ACCT. NUMBER: [id number]  DATE OF SERVICE: 21  START TIME: 10:00 AM  END TIME: 10:50 AM  FACILITATOR: Zuri Armando LPCC  TOPIC: MH EBP Group: Emotions Management  [facility] Day Treatment  TRACK: 2A    NUMBER OF PARTICIPANTS: 5    Summary of Group / Topics Discussed:  Emotions Management: Understanding Emotions: Patients discussed the purpose of emotions and function they serve in our lives.  Reviewed core emotions, why they happen (triggers), and how they occur. The group assisted one anothers' understanding that: emotional experiences are important; difficult emotions have a place in our lives; and the differences between various emotions.    Patient Session Goals / Objectives:    Demonstrate understanding of types various emotions    Identify and discuss specific emotions and when they occur; understand triggers    Discuss barriers to emotional regulation    Telemedicine Visit: The patient's condition can be safely assessed and treated via synchronous audio and visual telemedicine encounter.      Reason for Telemedicine Visit: Services only offered telehealth and due to COVID-19    Originating Site (Patient Location): Patient's home    Distant Site (Provider Location): Provider Remote Setting    Consent:  The patient/guardian has verbally consented to: the potential risks and benefits of telemedicine (video visit) versus in person care; bill my insurance or make self-payment for services provided; and responsibility for payment of non-covered services.     Mode of Communication:  Video Conference via Zoom    As the provider I attest to compliance with applicable laws and regulations related to telemedicine.        Patient Participation / Response:  Fully participated with the group by sharing personal reflections / insights and openly received / provided feedback with  other participants.    Demonstrated understanding of topics discussed through group discussion and participation, Expressed understanding of the relevance / importance of emotions management skills at distressing times in life and Self-aware of experiences with difficult emotions, and strategies to employ to manage them    Treatment Plan:  Patient has a current master individualized treatment plan.  See Epic treatment plan for more information.    Zuri Armando, LPCC

## 2021-06-15 ENCOUNTER — HOSPITAL ENCOUNTER (OUTPATIENT)
Dept: BEHAVIORAL HEALTH | Facility: CLINIC | Age: 36
End: 2021-06-15
Attending: PSYCHIATRY & NEUROLOGY
Payer: COMMERCIAL

## 2021-06-15 DIAGNOSIS — F33.1 MAJOR DEPRESSIVE DISORDER, RECURRENT EPISODE, MODERATE (H): ICD-10-CM

## 2021-06-15 DIAGNOSIS — F43.10 PTSD (POST-TRAUMATIC STRESS DISORDER): ICD-10-CM

## 2021-06-15 PROCEDURE — 90853 GROUP PSYCHOTHERAPY: CPT | Mod: GT | Performed by: COUNSELOR

## 2021-06-15 PROCEDURE — 90853 GROUP PSYCHOTHERAPY: CPT | Mod: GT | Performed by: SOCIAL WORKER

## 2021-06-15 NOTE — GROUP NOTE
Psychotherapy Group Note    PATIENT'S NAME: Mraiusz Huntley  MRN:   [id number]  :   1985  ACCT. NUMBER: [id number]  DATE OF SERVICE: 6/15/21  START TIME: 11:00 AM  END TIME: 11:50 AM  FACILITATOR: Karine Negrete LICSW  TOPIC: MH EBP Group: Symptom Awareness  [facility] Adult Mental Health Day Treatment  TRACK: 2A    NUMBER OF PARTICIPANTS: 5    Summary of Group / Topics Discussed:  Symptom Awareness: Mood Disorders: Patients received a general overview of mood disorders including depressive disorders, anxiety disorders, and bipolar disorders and how it relates to their current symptoms. The purpose is to promote understanding of their diagnoses and how it impacts their functioning. Patients reviewed their current awareness of symptoms and diagnoses. Patients received information regarding diagnoses, etiology, cultural, and environmental factors as well as impact on functioning.     Patient Session Goals / Objectives:    Discussed patient individual symptoms and experiences    Reviewed diagnostic criteria and etiology of diagnoses   Telemedicine Visit: The patient's condition can be safely assessed and treated via synchronous audio and visual telemedicine encounter.      Reason for Telemedicine Visit: Services only offered telehealth    Originating Site (Patient Location): Patient's home    Distant Site (Provider Location): Provider Remote Setting    Consent:  The patient/guardian has verbally consented to: the potential risks and benefits of telemedicine (video visit) versus in person care; bill my insurance or make self-payment for services provided; and responsibility for payment of non-covered services.     Mode of Communication:  Video Conference via Selero    As the provider I attest to compliance with applicable laws and regulations related to telemedicine.       Patient Participation / Response:  Fully participated with the group by sharing personal reflections / insights  and openly received / provided feedback with other participants.    Identified / Expressed personal readiness to increase awareness of symptoms and apply skills as necessary    Treatment Plan:  Patient has a current master individualized treatment plan.  See Epic treatment plan for more information.    Karine Negrete, ESMESW

## 2021-06-15 NOTE — GROUP NOTE
Psychotherapy Group Note    PATIENT'S NAME: Mariusz Huntley  MRN:   [id number]  :   1985  ACCT. NUMBER: [id number]  DATE OF SERVICE: 6/15/21  START TIME: 10:00 AM  END TIME: 10:50 AM  FACILITATOR: Zuri Armando LPCC  TOPIC: MH EBP Group: Coping Skills  [facility] Adult Mental Health Day Treatment  TRACK: 2A    NUMBER OF PARTICIPANTS: 6    Summary of Group / Topics Discussed:  Coping Skills: Additional Coping Skills:  Patients discussed and practiced utilizing applications on devices to engage in meditation and coping.  Reviewed the benefits of applying the aforementioned coping strategies.  Patients explored how these strategies might be applied to daily stressors or distressing situations.    Patient Session Goals / Objectives:    Understand the purpose and benefits of applying mental health applications coping strategies    Researched and discussed options for mental health and brain applications    Address barriers to utilizing coping skills when in distress.      Telemedicine Visit: The patient's condition can be safely assessed and treated via synchronous audio and visual telemedicine encounter.      Reason for Telemedicine Visit: Services only offered telehealth and due to COVID-19    Originating Site (Patient Location): Patient's home    Distant Site (Provider Location): Provider Remote Setting    Consent:  The patient/guardian has verbally consented to: the potential risks and benefits of telemedicine (video visit) versus in person care; bill my insurance or make self-payment for services provided; and responsibility for payment of non-covered services.     Mode of Communication:  Video Conference via ikeGPS    As the provider I attest to compliance with applicable laws and regulations related to telemedicine.    Patient Participation / Response:  Fully participated with the group by sharing personal reflections / insights and openly received / provided feedback with other  participants.    Demonstrated understanding of topics discussed through group discussion and participation, Expressed understanding of the relevance / importance of coping skills at distressing times in life and Demonstrated knowledge of when to consider using a variety of coping skills in daily life    Treatment Plan:  Patient has a current master individualized treatment plan.  See Epic treatment plan for more information.    Zuri Armando, LPCC

## 2021-06-15 NOTE — GROUP NOTE
"Process Group Note    PATIENT'S NAME: Mariusz Huntley  MRN:   [id number]  :   1985  ACCT. NUMBER: [id number]  DATE OF SERVICE: 6/15/21  START TIME:  9:00 AM  END TIME:  9:50 AM  FACILITATOR: Zuri Armando LPCC  TOPIC:  Process Group    Diagnoses:  296.32 Major Depressive Disorder, Moderate  309.81 PTSD  R/O Panic Disorder    [facility] Day Treatment  TRACK: 2A    NUMBER OF PARTICIPANTS: 6    Telemedicine Visit: The patient's condition can be safely assessed and treated via synchronous audio and visual telemedicine encounter.      Reason for Telemedicine Visit: Services only offered telehealth and due to COVID-19    Originating Site (Patient Location): Patient's home    Distant Site (Provider Location): Provider Remote Setting    Consent:  The patient/guardian has verbally consented to: the potential risks and benefits of telemedicine (video visit) versus in person care; bill my insurance or make self-payment for services provided; and responsibility for payment of non-covered services.     Mode of Communication:  Video Conference via Zoom    As the provider I attest to compliance with applicable laws and regulations related to telemedicine.            Data:    Session content: At the start of this group, patients were invited to check in by identifying themselves, describing their current emotional status, and identifying issues to address in this group.   Area(s) of treatment focus addressed in this session included Symptom Management, Personal Safety and Community Resources/Discharge Planning.    Patient processed feeling anxious prior to group and taking a PRN to address those symptoms. Patient expressed feeling happy in regards to a \"fun day\" with her daughters, niece and nephew yesterday.     Therapeutic Interventions/Treatment Strategies:  Psychotherapist offered support, feedback and validation and reinforced use of skills. Treatment modalities used include " Motivational Interviewing and Cognitive Behavioral Therapy. Interventions include Coping Skills: Assisted patient in identifying 1-2 healthy distraction skills to reduce overall distress, Symptoms Management: Promoted understanding of their diagnoses and how it impacts their functioning and Emotions Management:  Discussed barriers to emotional regulation.    Assessment:    Patient response:   Patient responded to session by accepting feedback, giving feedback, listening, focusing on goals, being attentive and accepting support    Possible barriers to participation / learning include: and no barriers identified    Health Issues:   None reported       Substance Use Review:   Substance Use: Last use: Cannabis yesterday    Mental Status/Behavioral Observations  Appearance:   Appropriate   Eye Contact:   Good   Psychomotor Behavior: Normal   Attitude:   Cooperative   Orientation:   All  Speech   Rate / Production: Normal/ Responsive Normal    Volume:  Normal   Mood:    Anxious  Depressed  Normal  Affect:    Appropriate   Thought Content:   Clear and Safety denies any current safety concerns including suicidal ideation, self-harm, and homicidal ideation  Thought Form:  Coherent  Logical     Insight:    Good     Plan:     Safety Plan: No current safety concerns identified.  Recommended that patient call 911 or go to the local ED should there be a change in any of these risk factors.     Barriers to treatment: None identified    Patient Contracts (see media tab):  None    Substance Use: Provided encouragement towards sobriety     Continue or Discharge: Patient will continue in Adult Day Treatment (ADT)  as planned. Patient is likely to benefit from learning and using skills as they work toward the goals identified in their treatment plan.      Zuri Armando, [facility]  Vivien 15, 2021

## 2021-06-17 ENCOUNTER — HOSPITAL ENCOUNTER (OUTPATIENT)
Dept: BEHAVIORAL HEALTH | Facility: CLINIC | Age: 36
End: 2021-06-17
Attending: PSYCHIATRY & NEUROLOGY
Payer: COMMERCIAL

## 2021-06-17 DIAGNOSIS — F33.1 MAJOR DEPRESSIVE DISORDER, RECURRENT EPISODE, MODERATE (H): ICD-10-CM

## 2021-06-17 DIAGNOSIS — F43.10 PTSD (POST-TRAUMATIC STRESS DISORDER): ICD-10-CM

## 2021-06-17 PROCEDURE — 90853 GROUP PSYCHOTHERAPY: CPT | Mod: 95 | Performed by: SOCIAL WORKER

## 2021-06-17 PROCEDURE — 90853 GROUP PSYCHOTHERAPY: CPT | Mod: GT | Performed by: SOCIAL WORKER

## 2021-06-17 PROCEDURE — 90853 GROUP PSYCHOTHERAPY: CPT | Mod: GT

## 2021-06-17 NOTE — GROUP NOTE
Psychotherapy Group Note    PATIENT'S NAME: Mariusz Huntley  MRN:   [id number]  :   1985  ACCT. NUMBER: [id number]  DATE OF SERVICE: 21  START TIME: 11:00 AM  END TIME: 11:50 AM  FACILITATOR: Karine Negrete LICSW  TOPIC: MH EBP Group: Self-Awareness  [facility] Adult Mental Health Day Treatment  TRACK: 2A    NUMBER OF PARTICIPANTS: 8    Summary of Group / Topics Discussed:  Self-Awareness: Self-Compassion: Patients received overview of key concepts in developing self-compassion. Patients discussed mindfulness, self-kindness, and finding common humanity. Patients identified their current approach to problems in their lives and learned skills for increasing self-compassion. Patients identified ways they can put self-compassion skills into practice and problem solve barriers to application of skills.     Patient Session Goals / Objectives:    [place] components of self-compassion    Identify ways to practice self-compassion in daily life    Problem solve barriers to self-compassion practice  Telemedicine Visit: The patient's condition can be safely assessed and treated via synchronous audio and visual telemedicine encounter.      Reason for Telemedicine Visit: Services only offered telehealth    Originating Site (Patient Location): Patient's home    Distant Site (Provider Location): Provider Remote Setting- Home Office    Consent:  The patient/guardian has verbally consented to: the potential risks and benefits of telemedicine (video visit) versus in person care; bill my insurance or make self-payment for services provided; and responsibility for payment of non-covered services.     Mode of Communication:  Video Conference via Orbeus    As the provider I attest to compliance with applicable laws and regulations related to telemedicine.       Patient Participation / Response:  Fully participated with the group by sharing personal reflections / insights and openly received /  provided feedback with other participants.    Identified / Expressed readiness to act intentionally, increase self-compassion, promote personal growth    Treatment Plan:  Patient has a current master individualized treatment plan.  See Epic treatment plan for more information.    Karine eNgrete, [facility]

## 2021-06-17 NOTE — GROUP NOTE
Telemedicine Visit: The patient's condition can be safely assessed and treated via synchronous audio and visual telemedicine encounter.      Reason for Telemedicine Visit: Services only offered telehealth    Originating Site (Patient Location): Patient's home    Distant Site (Provider Location): Provider Remote Setting- Home Office    Consent:  The patient/guardian has verbally consented to: the potential risks and benefits of telemedicine (video visit) versus in person care; bill my insurance or make self-payment for services provided; and responsibility for payment of non-covered services.     Mode of Communication:  Video Conference via Revivn    As the provider I attest to compliance with applicable laws and regulations related to telemedicine.  Psychotherapy Group Note    PATIENT'S NAME: Mariusz Huntley  MRN:   [id number]  :   1985  ACCT. NUMBER: [id number]  DATE OF SERVICE: 21  START TIME:  9:00 AM  END TIME:  9:50 AM  FACILITATOR: Kat Cifuentes LMFT  TOPIC:  EBP Group: Emotions Management  [facility] Adult Mental Health Day Treatment  TRACK: 2A    NUMBER OF PARTICIPANTS:7    Summary of Group / Topics Discussed:  Emotions Management: Check Facts: Patients participated in an interactive approach to identifying and challenging cognitive distortions that arise following an event that triggers intense emotion.  Patients choose an emotional reaction/event to work on.  The group shared their experiences and thought processes for feedback.      Patient Session Goals / Objectives:    Learn the process of identifying thoughts associated with the situation and reaction    Learn to challenge cognitive distortions and reframe the situation/event/reaction     Distinguish between facts, feelings, thoughts    Gain understanding of how to interpret an emotional reaction    Practice identification of cognitive distortions and evaluating an emotionally charged situation more  rationally/objectively/mindfully      Patient Participation / Response:  Fully participated with the group by sharing personal reflections / insights and openly received / provided feedback with other participants.    Demonstrated understanding of topics discussed through group discussion and participation, Expressed understanding of the relevance / importance of emotions management skills at distressing times in life and Self-aware of experiences with difficult emotions, and strategies to employ to manage them    Treatment Plan:  Patient has a current master individualized treatment plan.  See Epic treatment plan for more information.    Kat Cifuentes LMFT

## 2021-06-17 NOTE — GROUP NOTE
Process Group Note    PATIENT'S NAME: Mariusz Huntley  MRN:   [id number]  :   1985  ACCT. NUMBER: [id number]  DATE OF SERVICE: 21  START TIME: 10:00 AM  END TIME: 10:50 AM  FACILITATOR: Karine Negrete LICSW  TOPIC: MH Process Group    Diagnoses:  296.32 Major Depressive Disorder, Moderate  309.81 PTSD  R/O Panic Disorder      [facility] Day Treatment  TRACK: 2A    NUMBER OF PARTICIPANTS: 8    Telemedicine Visit: The patient's condition can be safely assessed and treated via synchronous audio and visual telemedicine encounter.      Reason for Telemedicine Visit: Services only offered telehealth    Originating Site (Patient Location): Patient's home    Distant Site (Provider Location): Provider Remote Setting- Home Office    Consent:  The patient/guardian has verbally consented to: the potential risks and benefits of telemedicine (video visit) versus in person care; bill my insurance or make self-payment for services provided; and responsibility for payment of non-covered services.     Mode of Communication:  Video Conference via Zoona    As the provider I attest to compliance with applicable laws and regulations related to telemedicine.           Data:    Session content: At the start of this group, patients were invited to check in by identifying themselves, describing their current emotional status, and identifying issues to address in this group.   Area(s) of treatment focus addressed in this session included Symptom Management, Personal Safety and Community Resources/Discharge Planning.  Merit reported being offered a job with a flexible start date in late July or August.  She reported depressed mood and energy level are improved.  She stated that she has occasional passive suicidal thoughts.  She reported occasional self-injurious thoughts that she is able to not act on.  She stated that she has improved sleep.   Anhedonia is decreased.  She stated  that her concentration is improving but is still variable.  She is preparing to discharge from this program in the near future.      Therapeutic Interventions/Treatment Strategies:  Psychotherapist offered support, feedback and validation and reinforced use of skills. Treatment modalities used include Cognitive Behavioral Therapy. Interventions include Coping Skills: Assisted patient in identifying 1-2 healthy distraction skills to reduce overall distress.    Assessment:    Patient response:   Patient responded to session by giving feedback, listening and focusing on goals    Possible barriers to participation / learning include: and no barriers identified    Health Issues:   None reported       Substance Use Review:   Substance Use: No active concerns identified.    Mental Status/Behavioral Observations  Appearance:   Appropriate   Eye Contact:   Good   Psychomotor Behavior: Normal   Attitude:   Cooperative   Orientation:   All  Speech   Rate / Production: Normal    Volume:  Normal   Mood:    Anxious  Depressed   Affect:    Appropriate   Thought Content:   Clear  Thought Form:  Coherent  Logical     Insight:    Good     Plan:     Safety Plan: No current safety concerns identified.  Recommended that patient call 911 or go to the local ED should there be a change in any of these risk factors.     Barriers to treatment: None identified    Patient Contracts (see media tab):  None    Substance Use: Not addressed in session     Continue or Discharge: Patient will continue in Adult Day Treatment (ADT)  as planned. Patient is likely to benefit from learning and using skills as they work toward the goals identified in their treatment plan.      Karine Negrete, [facility]  June 17, 2021

## 2021-06-21 ENCOUNTER — HOSPITAL ENCOUNTER (OUTPATIENT)
Dept: BEHAVIORAL HEALTH | Facility: CLINIC | Age: 36
End: 2021-06-21
Attending: PSYCHIATRY & NEUROLOGY
Payer: COMMERCIAL

## 2021-06-21 DIAGNOSIS — F33.1 MAJOR DEPRESSIVE DISORDER, RECURRENT EPISODE, MODERATE (H): ICD-10-CM

## 2021-06-21 DIAGNOSIS — F43.10 PTSD (POST-TRAUMATIC STRESS DISORDER): ICD-10-CM

## 2021-06-21 PROCEDURE — 90853 GROUP PSYCHOTHERAPY: CPT | Mod: 95 | Performed by: PSYCHOLOGIST

## 2021-06-21 PROCEDURE — 90853 GROUP PSYCHOTHERAPY: CPT | Mod: GT | Performed by: PSYCHOLOGIST
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
## 2021-06-22 ENCOUNTER — HOSPITAL ENCOUNTER (OUTPATIENT)
Dept: BEHAVIORAL HEALTH | Facility: CLINIC | Age: 36
End: 2021-06-22
Attending: PSYCHIATRY & NEUROLOGY
Payer: COMMERCIAL

## 2021-06-22 DIAGNOSIS — F33.1 MAJOR DEPRESSIVE DISORDER, RECURRENT EPISODE, MODERATE (H): ICD-10-CM

## 2021-06-22 DIAGNOSIS — F43.10 PTSD (POST-TRAUMATIC STRESS DISORDER): ICD-10-CM

## 2021-06-22 PROCEDURE — 90853 GROUP PSYCHOTHERAPY: CPT | Mod: 95 | Performed by: SOCIAL WORKER

## 2021-06-22 PROCEDURE — 90853 GROUP PSYCHOTHERAPY: CPT | Mod: GT | Performed by: PSYCHOLOGIST

## 2021-06-22 NOTE — GROUP NOTE
Psychotherapy Group Note    PATIENT'S NAME: Mariusz Huntley  MRN:   [id number]  :   1985  ACCT. NUMBER: [id number]  DATE OF SERVICE: 21  START TIME: 10:00 AM  END TIME: 10:50 AM  FACILITATOR: Zuri Armando LPCC; Rosalia Reich LPCC  TOPIC: MH EBP Group: Coping Skills  [facility] Adult Mental Health Day Treatment  TRACK: 2A    NUMBER OF PARTICIPANTS: 7    Summary of Group / Topics Discussed:  Coping Skills: Meditation: Patients learned about meditation and explored how and when to utilize it to increase focus, reduce mental health symptoms, decrease physical tension, and improve mental well-being.  Approaches to meditation were presented as a means of increasing self-awareness. The benefits of various meditation practices were discussed, as well as barriers to utilization of this coping strategy.     Patient Session Goals / Objectives:    Understand the purpose and efficacy of using meditation modalities to reduce stress / symptoms.    Review / discuss situations in daily life that cause distress, where establishing a meditation routine or meditating as needed may improve functioning.      Verbalize understanding of how and when to apply grounding strategies to reduce distress and increase presence in the moment.    Practice meditation and address barriers to use in daily life.                                      Service Modality:  Video Visit     Telemedicine Visit: The patient's condition can be safely assessed and treated via synchronous audio and visual telemedicine encounter.      Reason for Telemedicine Visit: Services only offered telehealth and due to COVID-19    Originating Site (Patient Location): Patient's home    Distant Site (Provider Location): Provider Remote Setting- Home Office    Consent:  The patient/guardian has verbally consented to: the potential risks and benefits of telemedicine (video visit) versus in person care; bill my insurance or make self-payment for  services provided; and responsibility for payment of non-covered services.     Patient would like the video invitation sent by:  My Chart    Mode of Communication:  Video Conference via Medical Zoom    As the provider I attest to compliance with applicable laws and regulations related to telemedicine.              Patient Participation / Response:  Fully participated with the group by sharing personal reflections / insights and openly received / provided feedback with other participants.    Demonstrated understanding of topics discussed through group discussion and participation and Expressed understanding of the relevance / importance of coping skills at distressing times in life    Treatment Plan:  Patient has a current master individualized treatment plan.  See Epic treatment plan for more information.    Zuri Armando, LPCC

## 2021-06-22 NOTE — GROUP NOTE
Psychotherapy Group Note    PATIENT'S NAME: Mariusz Huntley  MRN:   [id number]  :   1985  ACCT. NUMBER: [id number]  DATE OF SERVICE: 21  START TIME: 11:00 AM  END TIME: 11:50 AM  FACILITATOR: Eric Alejandre LP  TOPIC:  EBP Group: Emotions Management  [facility] Adult Mental Health Day Treatment  TRACK: 2A    NUMBER OF PARTICIPANTS: 5    Telemedicine Visit: The patient's condition can be safely assessed and treated via synchronous audio and visual telemedicine encounter.      Reason for Telemedicine Visit: Services only offered telehealth    Originating Site (Patient Location): Patient's home    Distant Site (Provider Location): Provider Remote Setting- Home Office    Consent:  The patient/guardian has verbally consented to: the potential risks and benefits of telemedicine (video visit) versus in person care; bill my insurance or make self-payment for services provided; and responsibility for payment of non-covered services.     Mode of Communication:  Video Conference via [employer]    As the provider I attest to compliance with applicable laws and regulations related to telemedicine.      Summary of Group / Topics Discussed:  Emotions Management: Understanding Emotions: Patients discussed the purpose of emotions and function they serve in our lives.  Reviewed core emotions, why they happen (triggers), and how they occur. The group assisted one anothers' understanding that: emotional experiences are important; difficult emotions have a place in our lives; and the differences between various emotions.    Patient Session Goals / Objectives:    Demonstrate understanding of types various emotions    Identify and discuss specific emotions and when they occur; understand triggers    Discuss barriers to emotional regulation      Patient Participation / Response:  Fully participated with the group by sharing personal reflections / insights and openly received / provided  feedback with other participants.    Demonstrated understanding of topics discussed through group discussion and participation    Treatment Plan:  Patient has a current master individualized treatment plan.  See Epic treatment plan for more information.    Eric Alejandre, LP

## 2021-06-22 NOTE — GROUP NOTE
Psychotherapy Group Note    PATIENT'S NAME: Mariusz Huntley  MRN:   [id number]  :   1985  ACCT. NUMBER: [id number]  DATE OF SERVICE: 21  START TIME: 11:00 AM  END TIME: 11:50 AM  FACILITATOR: Karine Negrete LICSW  TOPIC: MH EBP Group: Cognitive Restructuring  [facility] Adult Mental Health Day Treatment  TRACK: 2A    NUMBER OF PARTICIPANTS: 7    Summary of Group / Topics Discussed:  Cognitive Restructuring: Core Beliefs: Patients received an overview of what a core belief is, and how they develop. Patients then began to identify their negative core beliefs. Patients worked to modify core beliefs with the goal of improved self-image and functioning.     Patient Session Goals / Objectives:    Familiarize self with the concept of core beliefs and how they develop.      Explore personal core beliefs (positive and negative)    Develop / advance recognition of the connection between negative thoughts and negative core beliefs.    Formulate new neutral/positive core beliefs                                      Service Modality:  Video Visit     Telemedicine Visit: The patient's condition can be safely assessed and treated via synchronous audio and visual telemedicine encounter.      Reason for Telemedicine Visit: Services only offered telehealth    Originating Site (Patient Location): Patient's home    Distant Site (Provider Location): Provider Remote Setting- Home Office    Consent:  The patient/guardian has verbally consented to: the potential risks and benefits of telemedicine (video visit) versus in person care; bill my insurance or make self-payment for services provided; and responsibility for payment of non-covered services.     Patient would like the video invitation sent by:  My Chart    Mode of Communication:  Video Conference via Medical Zoom    As the provider I attest to compliance with applicable laws and regulations related to telemedicine.                Patient  Participation / Response:  Fully participated with the group by sharing personal reflections / insights and openly received / provided feedback with other participants.    Identified barriers to changing thought patterns    Treatment Plan:  Patient has a current master individualized treatment plan.  See Epic treatment plan for more information.    Karine Negrete, [facility]SW

## 2021-06-22 NOTE — GROUP NOTE
Process Group Note                                    Service Modality:  Video Visit     Telemedicine Visit: The patient's condition can be safely assessed and treated via synchronous audio and visual telemedicine encounter.      Reason for Telemedicine Visit: Patient has requested telehealth visit, Patient unable to travel, Patient convenience (e.g. access to timely appointments / distance to available provider), Patient lives in a designated Health Professional Shortage Area (HPSA), and Services only offered telehealth    Originating Site (Patient Location): Patient's home    Distant Site (Provider Location): [facility]: [facility], [facility]    Consent:  The patient/guardian has verbally consented to: the potential risks and benefits of telemedicine (video visit) versus in person care; bill my insurance or make self-payment for services provided; and responsibility for payment of non-covered services.     Patient would like the video invitation sent by:  My Chart    Mode of Communication:  Video Conference via Medical Zoom    As the provider I attest to compliance with applicable laws and regulations related to telemedicine.        PATIENT'S NAME: Mariusz Huntley  MRN:   [id number]  :   1985  ACCT. NUMBER: [id number]  DATE OF SERVICE: 21  START TIME:  9:00 AM  END TIME:  9:50 AM  FACILITATOR: Milagros Meza PsyD  TOPIC:  Process Group    Diagnoses:  296.32 Major Depressive Disorder, Moderate  309.81 PTSD  R/O Panic Disorder    Psychiatry: [facility] [place]  Therapy: Dr. Casper, [facility]  PCP:  .Min..      [facility] Adult Mental Health Day Treatment  TRACK: 2A    NUMBER OF PARTICIPANTS: 7          Data:    Session content: At the start of this group, patients were invited to check in by identifying themselves, describing their current emotional status, and identifying issues to address in this group.   Area(s) of treatment focus  addressed in this session included Symptom Management, Personal Safety and Community Resources/Discharge Planning.  Client reported being safe today.  Reported mood is depressed.    Goal for today is to attend therapy groups.  The client talked to the group about how she feels embarrassed and ashamed of the situation with her mom getting drunk on the past weekend, and did some self-harm. She was validated by the group and said that she will go to an Al-Anon group for personal support.      Therapeutic Interventions/Treatment Strategies:  Psychotherapist reinforced use of skills. Treatment modalities used include Cognitive Behavioral Therapy and Dialectical Behavioral Therapy. Interventions include Coping Skills: Assisted patient in understanding the purpose of planning / creating / participating / sharing in positive experiences, Relapse Prevention: Assisted patient in identifying personal vulnerabilities, thoughts, emotions, and situations that may lead to relapse , Mindfulness: Encouraged a plan to use mindfulness skills in daily life and Symptoms Management: Promoted understanding of their diagnoses and how it impacts their functioning.    Assessment:    Patient response:   Patient responded to session by giving feedback, listening and focusing on goals    Possible barriers to participation / learning include: severity of symptoms    Health Issues:   None reported       Substance Use Review:   Substance Use: No active concerns identified.    Mental Status/Behavioral Observations  Appearance:   Appropriate   Eye Contact:   Good   Psychomotor Behavior: Normal   Attitude:   Cooperative   Orientation:   All  Speech   Rate / Production: Normal    Volume:  Soft   Mood:    Depressed  Sad   Affect:    Constricted   Thought Content:   Rumination  Thought Form:  Coherent  Logical     Insight:    Good     Plan:     Safety Plan: Recommended that patient call 911 or go to the local ED should there be a change in any of these  risk factors.     Barriers to treatment: None identified    Patient Contracts (see media tab):  None    Substance Use: Provided encouragement towards sobriety     Continue or Discharge: Patient will continue in Adult Day Treatment (ADT)  as planned. Patient is likely to benefit from learning and using skills as they work toward the goals identified in their treatment plan.      Milagros Meza PsyD  June 22, 2021  Sheryl Head D,  Licensed Clinical Psychologist

## 2021-06-24 ENCOUNTER — HOSPITAL ENCOUNTER (OUTPATIENT)
Dept: BEHAVIORAL HEALTH | Facility: CLINIC | Age: 36
End: 2021-06-24
Attending: PSYCHIATRY & NEUROLOGY
Payer: COMMERCIAL

## 2021-06-24 DIAGNOSIS — F33.1 MAJOR DEPRESSIVE DISORDER, RECURRENT EPISODE, MODERATE (H): ICD-10-CM

## 2021-06-24 DIAGNOSIS — F43.10 PTSD (POST-TRAUMATIC STRESS DISORDER): ICD-10-CM

## 2021-06-24 PROCEDURE — 90853 GROUP PSYCHOTHERAPY: CPT | Mod: 95 | Performed by: SOCIAL WORKER

## 2021-06-24 PROCEDURE — 90853 GROUP PSYCHOTHERAPY: CPT | Mod: GT | Performed by: PSYCHOLOGIST

## 2021-06-24 PROCEDURE — 90853 GROUP PSYCHOTHERAPY: CPT | Mod: GT | Performed by: COUNSELOR

## 2021-06-24 NOTE — GROUP NOTE
Process Group Note                                    Service Modality:  Video Visit     Telemedicine Visit: The patient's condition can be safely assessed and treated via synchronous audio and visual telemedicine encounter.      Reason for Telemedicine Visit: Patient has requested telehealth visit, Patient unable to travel, Patient convenience (e.g. access to timely appointments / distance to available provider), Patient lives in a designated Health Professional Shortage Area (HPSA), and Services only offered telehealth    Originating Site (Patient Location): Patient's home    Distant Site (Provider Location): [facility]: [facility], [facility]    Consent:  The patient/guardian has verbally consented to: the potential risks and benefits of telemedicine (video visit) versus in person care; bill my insurance or make self-payment for services provided; and responsibility for payment of non-covered services.     Patient would like the video invitation sent by:  My Chart    Mode of Communication:  Video Conference via Medical Zoom    As the provider I attest to compliance with applicable laws and regulations related to telemedicine.        PATIENT'S NAME: Mariusz Huntley  MRN:   [id number]  :   1985  ACCT. NUMBER: [id number]  DATE OF SERVICE: 21  START TIME:  9:00 AM  END TIME:  9:50 AM  FACILITATOR: Milagros Meza PsyD  TOPIC:  Process Group    Diagnoses:  296.32 Major Depressive Disorder, Moderate  309.81 PTSD  R/O Panic Disorder    Psychiatry: [facility] [place]  Therapy: Dr. Casper, [facility]  PCP:  .Min..      [facility] Adult Mental Health Day Treatment  TRACK: 2A    NUMBER OF PARTICIPANTS: 7          Data:    Session content: At the start of this group, patients were invited to check in by identifying themselves, describing their current emotional status, and identifying issues to address in this group.   Area(s) of treatment focus  addressed in this session included Symptom Management, Personal Safety, Community Resources/Discharge Planning and Abstinence/Relapse Prevention.  Client reported being safe today.  Reported mood is anxious.     Goal for today is to attend therapy. The client talked to the group about how she is happy to be out with her kids in the summer, but when anxious, she smoked marijuana. She talked with the group about trying to get medical marijuana for her anxiety and trauma and plans to talk to her new psychiatrist about this.   She reported that she will start work on trauma issues with her therapist.      Therapeutic Interventions/Treatment Strategies:  Psychotherapist reinforced use of skills. Treatment modalities used include Cognitive Behavioral Therapy and Dialectical Behavioral Therapy. Interventions include Cognitive Restructuring:  Facilitated recognition of the connection between negative thoughts and negative core beliefs, Coping Skills: Promoted understanding of how and when to apply grounding strategies to reduce distress and increase presence in the moment, Mindfulness: Facilitated discussion of when/how to use mindfulness skills to benefit general health, mental health symptoms, and stressors and Symptoms Management: Promoted understanding of their diagnoses and how it impacts their functioning.    Assessment:    Patient response:   Patient responded to session by giving feedback, listening and being attentive    Possible barriers to participation / learning include: severity of symptoms    Health Issues:   None reported       Substance Use Review:   Substance Use: No active concerns identified.    Mental Status/Behavioral Observations  Appearance:   Appropriate   Eye Contact:   Good   Psychomotor Behavior: Normal   Attitude:   Cooperative   Orientation:   All  Speech   Rate / Production: Normal    Volume:  Normal   Mood:    Anxious   Affect:    Constricted   Thought Content:   Rumination  Thought  Form:  Coherent  Logical     Insight:    Good     Plan:     Safety Plan: Recommended that patient call 911 or go to the local ED should there be a change in any of these risk factors.     Barriers to treatment: None identified    Patient Contracts (see media tab):  None    Substance Use: Provided encouragement towards sobriety     Continue or Discharge: Patient will continue in Adult Day Treatment (ADT)  as planned. Patient is likely to benefit from learning and using skills as they work toward the goals identified in their treatment plan.      Milagros Meza PsyD  June 24, 2021  Selene Head., D,  Licensed Clinical Psychologist

## 2021-06-24 NOTE — PLAN OF CARE
Shift Summary  Psych  Pt presents polite and cooperative. Able to communicate needs. Speech is clear and coherent with good eye contact. Distractable with poor concentration. Facial expression and general demeanor reveal depressed mood and affect.  Subjectively does not look psychotic. Insight and judgement to her illness is fair. No evidence of psychosis, paranoid, delusional thoughts or disorganized behavior.   Denies any suicidal ideation, homicidal ideation, Self injury behavior, hallucination or racing thought.   Prn: none  Physical  Pt alert and oriented x 3. Denies pain. Does not appear to be in any kind of distress or pain. Vital sings WNL (see flow sheet for details). Slept 7.0 hours last night. Good medication compliance is noted. Seems tolerating medications well. No side effect reported by pt or noted by this writer. Appetite adequate. No problem with bowel and bladder.   Prn: none  Continue to monitor pt's status Q 15 minutes and stabilize the patient's symptoms with the use of medications and therapeutic programming.                  Tomás 45 Transitions Initial Follow Up Call    Call within 2 business days of discharge: Yes    Patient: Yamil Campbell Patient : 1940   MRN: [id number]  Reason for Admission:   Discharge Date: 21 RARS: Readmission Risk Score: 17      Last Discharge [facility]       Complaint Diagnosis Description Type Department Provider    21 Rash Rash and other nonspecific skin eruption ED (DISCHARGE) [facility]         Attempted to reach patient by phone for ED follow up. HIPAA compliant message left on patient's voicemail; CTN contact information provided.       Alfred Shaffer RN

## 2021-06-24 NOTE — GROUP NOTE
Psychoeducation Group Note    PATIENT'S NAME: Mariusz Huntley  MRN:   [id number]  :   1985  ACCT. NUMBER: [id number]  DATE OF SERVICE: 21  START TIME: 10:00 AM  END TIME: 10:50 AM  FACILITATOR: Zuri Armando LPCC  TOPIC: MH Life Skills Group: Lifestyle Balance and Structure  [facility] Adult Mental Health Day Treatment  TRACK: 2A    NUMBER OF PARTICIPANTS: 7    Summary of Group / Topics Discussed:  Lifestyle Balance and Strucure:  Time Management: Motivation - Intrinsic and Extrinsic Motivators: Patients were taught and applied skills and strategies to effectively manage their time, energy, and resources.  Patients discussed motivation as it relates to mental health symptoms and shared individual challenges to engaging in and accomplishing meaningful activities of daily living.  Patients were educated on intrinsic and extrinsic motivation and provided with strategies on how to use these motivators to accomplish activities.     Patient Session Goals / Objectives:    Identified how mental health symptoms impact motivation and can lead to difficulty completing activities of daily living      Identified tasks they are having difficulty engaging in and completing and at least one intrinsic and one extrinsic motivator to try to help improve follow through                                          Service Modality:  Video Visit     Telemedicine Visit: The patient's condition can be safely assessed and treated via synchronous audio and visual telemedicine encounter.      Reason for Telemedicine Visit: Services only offered telehealth and due to COVID-19    Originating Site (Patient Location): Patient's home    Distant Site (Provider Location): Provider Remote Setting- Home Office    Consent:  The patient/guardian has verbally consented to: the potential risks and benefits of telemedicine (video visit) versus in person care; bill my insurance or make self-payment for services provided; and  responsibility for payment of non-covered services.     Patient would like the video invitation sent by:  My Chart    Mode of Communication:  Video Conference via Medical Zoom    As the provider I attest to compliance with applicable laws and regulations related to telemedicine.            Patient Participation / Response:  Fully participated with the group by sharing personal reflections / insights and openly received / provided feedback with other participants.    Verbalized understanding of content and Patient worked towards initial treatment plan goals     Treatment Plan:  Patient has a current master individualized treatment plan.  See Epic treatment plan for more information.    Zuri Armando, [facility]

## 2021-06-24 NOTE — GROUP NOTE
Psychotherapy Group Note    PATIENT'S NAME: Mariusz Huntley  MRN:   [id number]  :   1985  ACCT. NUMBER: [id number]  DATE OF SERVICE: 21  START TIME: 11:00 AM  END TIME: 11:50 AM  FACILITATOR: Karine Negrete LICSW  TOPIC: MH EBP Group: Cognitive Restructuring  [facility] Adult Mental Health Day Treatment  TRACK: 2A    NUMBER OF PARTICIPANTS: 7    Summary of Group / Topics Discussed:  Cognitive Restructuring: Perfectionism: Patients discussed and reflected on what perfectionism is, how it develops, and how it impacts functioning. Ways to challenge perfectionism were explored and discussed by the group, with the goal of challenging perfectionistic thinking and improving functioning.    Patient Session Goals / Objectives:    Understand the concept of perfectionistic thoughts and how they develop.     Increase recognition of the connection between perfectionistic thinking and symptoms.    Explore and practice new ways to challenge the perfectionistic stance and replace with reasonable expectations of self and others.    Begin to formulate realistic personal expectations and goals.                                           Service Modality:  Video Visit     Telemedicine Visit: The patient's condition can be safely assessed and treated via synchronous audio and visual telemedicine encounter.      Reason for Telemedicine Visit: Services only offered telehealth    Originating Site (Patient Location): Patient's home    Distant Site (Provider Location): Provider Remote Setting- Home Office    Consent:  The patient/guardian has verbally consented to: the potential risks and benefits of telemedicine (video visit) versus in person care; bill my insurance or make self-payment for services provided; and responsibility for payment of non-covered services.     Patient would like the video invitation sent by:  My Chart    Mode of Communication:  Video Conference via Medical Zoom    As the provider I  attest to compliance with applicable laws and regulations related to telemedicine.           Patient Participation / Response:  Fully participated with the group by sharing personal reflections / insights and openly received / provided feedback with other participants.    Demonstrated knowledge of personal thought patterns and how they impact their mood and behavior.    Treatment Plan:  Patient has a current master individualized treatment plan.  See Epic treatment plan for more information.    Karine Negrete, [facility]

## 2021-06-28 ENCOUNTER — HOSPITAL ENCOUNTER (OUTPATIENT)
Dept: BEHAVIORAL HEALTH | Facility: CLINIC | Age: 36
End: 2021-06-28
Attending: PSYCHIATRY & NEUROLOGY
Payer: COMMERCIAL

## 2021-06-28 DIAGNOSIS — F33.1 MAJOR DEPRESSIVE DISORDER, RECURRENT EPISODE, MODERATE (H): ICD-10-CM

## 2021-06-28 DIAGNOSIS — F43.10 PTSD (POST-TRAUMATIC STRESS DISORDER): ICD-10-CM

## 2021-06-28 PROCEDURE — 90853 GROUP PSYCHOTHERAPY: CPT | Mod: 95 | Performed by: COUNSELOR

## 2021-06-28 PROCEDURE — 90853 GROUP PSYCHOTHERAPY: CPT | Mod: 95 | Performed by: SOCIAL WORKER

## 2021-06-28 PROCEDURE — 90853 GROUP PSYCHOTHERAPY: CPT | Mod: 95 | Performed by: PSYCHOLOGIST

## 2021-06-28 NOTE — GROUP NOTE
Psychotherapy Group Note    PATIENT'S NAME: Mariusz Huntley  MRN:   [id number]  :   1985  ACCT. NUMBER: [id number]  DATE OF SERVICE: 21  START TIME: 10:00 AM  END TIME: 10:50 AM  FACILITATOR: Zuri Armando LPCC  TOPIC: MH EBP Group: Behavioral Activation  [facility] Adult Mental Health Day Treatment  TRACK: 2A    NUMBER OF PARTICIPANTS: 8    Summary of Group / Topics Discussed:  Behavioral Activation: Motivation and Procrastination: Patients explored how they currently spend their time, identifying thoughts and feelings that are motivating and serve to increase desired behaviors.  They also examined behaviors that contribute to procrastination.  Different types of procrastination behaviors were identified, and strategies to reduce individual procrastination and increase motivation were explored and practiced.  Patients identified ways to increase goal-directed activities to enhance mood and reduce symptoms.        Patient Session Goals / Objectives:    Identify current patterns of procrastination behavior and how they influence thoughts and moods, and inhibit motivation.    Identify behaviors that can be implemented that contribute to improving thoughts and feelings, motivation, and reduce symptoms.    Identify and develop a plan to increase activities that promote a sense of accomplishment and competence.    Practice scheduling positive activities / behaviors into daily routines.    Telemedicine Visit: The patient's condition can be safely assessed and treated via synchronous audio and visual telemedicine encounter.      Reason for Telemedicine Visit: Services only offered telehealth and due to COVID-19    Originating Site (Patient Location): Patient's home    Distant Site (Provider Location): Provider Remote Setting- Home Office    Consent:  The patient/guardian has verbally consented to: the potential risks and benefits of telemedicine (video visit) versus in person care; bill my  insurance or make self-payment for services provided; and responsibility for payment of non-covered services.     Mode of Communication:  Video Conference via [employer]    As the provider I attest to compliance with applicable laws and regulations related to telemedicine.        Patient Participation / Response:  Fully participated with the group by sharing personal reflections / insights and openly received / provided feedback with other participants.    Demonstrated understanding of topics discussed through group discussion and participation, Expressed understanding of the relationship between behaviors, thoughts, and feelings and Shared experiences and challenges with making behavioral changes    Treatment Plan:  Patient has a current master individualized treatment plan.  See Epic treatment plan for more information.    Zuri Armando, [facility]

## 2021-06-28 NOTE — GROUP NOTE
Process Group Note                                    Service Modality:  Video Visit     Telemedicine Visit: The patient's condition can be safely assessed and treated via synchronous audio and visual telemedicine encounter.      Reason for Telemedicine Visit: Patient has requested telehealth visit, Patient unable to travel, Patient convenience (e.g. access to timely appointments / distance to available provider), Patient lives in a designated Health Professional Shortage Area (HPSA), and Services only offered telehealth    Originating Site (Patient Location): Patient's home    Distant Site (Provider Location): [facility]: [facility], [facility]    Consent:  The patient/guardian has verbally consented to: the potential risks and benefits of telemedicine (video visit) versus in person care; bill my insurance or make self-payment for services provided; and responsibility for payment of non-covered services.     Patient would like the video invitation sent by:  My Chart    Mode of Communication:  Video Conference via Medical Zoom    As the provider I attest to compliance with applicable laws and regulations related to telemedicine.        PATIENT'S NAME: Mariusz Huntley  MRN:   [id number]  :   1985  ACCT. NUMBER: [id number]  DATE OF SERVICE: 21  START TIME:  9:00 AM  END TIME:  9:50 AM  FACILITATOR: Milagros Meza PsyD  TOPIC:  Process Group    Diagnoses:  296.32 Major Depressive Disorder, Moderate  309.81 PTSD  R/O Panic Disorder    Psychiatry: [facility] [place]  Therapy: Dr. Casper, [facility]  PCP:  .Min..      [facility] Adult Mental Health Day Treatment  TRACK: 2A    NUMBER OF PARTICIPANTS: 8          Data:    Session content: At the start of this group, patients were invited to check in by identifying themselves, describing their current emotional status, and identifying issues to address in this group.   Area(s) of treatment focus  addressed in this session included Symptom Management, Personal Safety, Community Resources/Discharge Planning and Abstinence/Relapse Prevention.  Client reported being safe today.  Reported mood is anxious.     Goal for today is to attend therapy and then discharge. The client talked to the group about how she is anxious to leave, because she likes the group, and that she is trying to get into [facility], but they have been playing phone tag with her.  She reported that she has more anxiety and was encouraged to talk with her psychiatrist about it. 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       Therapeutic Interventions/Treatment Strategies:  Psychotherapist reinforced use of skills. Treatment modalities used include Cognitive Behavioral Therapy and Dialectical Behavioral Therapy. Interventions include Cognitive Restructuring:  Assisted patient in formulating new neutral/positive alternatives to challenge less helpful / ineffective thoughts, Coping Skills: Assisted patient in understanding the purpose of planning / creating / participating / sharing in positive experiences, Mindfulness: Facilitated discussion of when/how to use mindfulness skills to benefit general health, mental health symptoms, and stressors and Symptoms Management: Promoted understanding of their diagnoses and how it impacts their functioning.    Assessment:    Patient response:   Patient responded to session by accepting feedback, listening and being attentive    Possible barriers to participation / learning include: severity of symptoms    Health Issues:   None reported       Substance Use Review:   Substance Use: No active concerns identified.    Mental Status/Behavioral Observations  Appearance:   Appropriate   Eye Contact:   Good   Psychomotor Behavior: Normal   Attitude:   Cooperative   Orientation:   All  Speech   Rate /  Production: Normal    Volume:  Normal   Mood:    Anxious  Depressed  Sad   Affect:    Constricted   Thought Content:   Rumination  Thought Form:  Coherent  Logical     Insight:    Good     Plan:     Safety Plan: Recommended that patient call 911 or go to the local ED should there be a change in any of these risk factors.     Barriers to treatment: None identified    Patient Contracts (see media tab):  None    Substance Use: Provided encouragement towards sobriety     Continue or Discharge: Patient will continue in Adult Day Treatment (ADT)  as planned. Patient is likely to benefit from learning and using skills as they work toward the goals identified in their treatment plan.      Milagros Meza PsyD  June 28, 2021  Sheryl Head, FEDE,  Licensed Clinical Psychologist

## 2021-06-28 NOTE — GROUP NOTE
Psychotherapy Group Note    PATIENT'S NAME: Mariusz Huntley  MRN:   [id number]  :   1985  ACCT. NUMBER: [id number]  DATE OF SERVICE: 21  START TIME: 11:00 AM  END TIME: 11:50 AM  FACILITATOR: Karine Negrete LICSW  TOPIC: MH EBP Group: Mindfulness  [facility] Adult Mental Health Day Treatment  TRACK: 2A    NUMBER OF PARTICIPANTS: 8    Summary of Group / Topics Discussed:  Mindfulness: What is Mindfulness: Patients received an overview on what mindfulness is and how mindfulness can benefit general health, mental health symptoms, and stressors. The history of mindfulness, its application to mental health therapies, and key concepts were also discussed. Patients discussed current awareness, knowledge, and practice of mindfulness skills. Patients also discussed barriers to mindfulness practice.     Patient Session Goals / Objectives:    Demonstrated understanding of key concepts and application to daily life    Identified when/how to use mindfulness     Resolved barriers to practice    Identified plan to use mindfulness in daily life  Telemedicine Visit: The patient's condition can be safely assessed and treated via synchronous audio and visual telemedicine encounter.      Reason for Telemedicine Visit: Services only offered telehealth    Originating Site (Patient Location): Patient's home    Distant Site (Provider Location): Provider Remote Setting- Home Office    Consent:  The patient/guardian has verbally consented to: the potential risks and benefits of telemedicine (video visit) versus in person care; bill my insurance or make self-payment for services provided; and responsibility for payment of non-covered services.     Mode of Communication:  Video Conference via WorldPassKey    As the provider I attest to compliance with applicable laws and regulations related to telemedicine.       Patient Participation / Response:  Fully participated with the group by sharing personal  reflections / insights and openly received / provided feedback with other participants.    Identified / Expressed personal readiness to practice mindfulness skills    Treatment Plan:  Patient has a current master individualized treatment plan.  See Epic treatment plan for more information.    Karine Negrete, [facility]

## 2021-07-16 PROBLEM — F33.1 MAJOR DEPRESSIVE DISORDER, RECURRENT EPISODE, MODERATE (H): Status: RESOLVED | Noted: 2021-03-11 | Resolved: 2021-07-16

## 2021-07-16 PROBLEM — F43.10 PTSD (POST-TRAUMATIC STRESS DISORDER): Status: RESOLVED | Noted: 2021-03-11 | Resolved: 2021-07-16

## 2021-07-16 NOTE — ADDENDUM NOTE
Encounter addended by: Milagros Meza PsyD on: 7/16/2021 8:45 AM   Actions taken: Episode resolved, Problem List modified

## 2021-09-18 ENCOUNTER — HEALTH MAINTENANCE LETTER (OUTPATIENT)
Age: 36
End: 2021-09-18

## 2022-01-10 ENCOUNTER — OFFICE VISIT (OUTPATIENT)
Dept: URGENT CARE | Facility: URGENT CARE | Age: 37
End: 2022-01-10
Payer: COMMERCIAL

## 2022-01-10 ENCOUNTER — E-VISIT (OUTPATIENT)
Dept: URGENT CARE | Facility: URGENT CARE | Age: 37
End: 2022-01-10
Payer: COMMERCIAL

## 2022-01-10 VITALS
HEIGHT: 67 IN | BODY MASS INDEX: 23.25 KG/M2 | SYSTOLIC BLOOD PRESSURE: 129 MMHG | OXYGEN SATURATION: 99 % | TEMPERATURE: 98.8 F | HEART RATE: 58 BPM | DIASTOLIC BLOOD PRESSURE: 80 MMHG | WEIGHT: 148.1 LBS

## 2022-01-10 DIAGNOSIS — Z20.822 SUSPECTED COVID-19 VIRUS INFECTION: Primary | ICD-10-CM

## 2022-01-10 DIAGNOSIS — Z20.822 SUSPECTED 2019 NOVEL CORONAVIRUS INFECTION: Primary | ICD-10-CM

## 2022-01-10 LAB — DEPRECATED S PYO AG THROAT QL EIA: NEGATIVE

## 2022-01-10 PROCEDURE — 87651 STREP A DNA AMP PROBE: CPT | Performed by: NURSE PRACTITIONER

## 2022-01-10 PROCEDURE — 99421 OL DIG E/M SVC 5-10 MIN: CPT | Performed by: FAMILY MEDICINE

## 2022-01-10 PROCEDURE — U0003 INFECTIOUS AGENT DETECTION BY NUCLEIC ACID (DNA OR RNA); SEVERE ACUTE RESPIRATORY SYNDROME CORONAVIRUS 2 (SARS-COV-2) (CORONAVIRUS DISEASE [COVID-19]), AMPLIFIED PROBE TECHNIQUE, MAKING USE OF HIGH THROUGHPUT TECHNOLOGIES AS DESCRIBED BY CMS-2020-01-R: HCPCS | Performed by: NURSE PRACTITIONER

## 2022-01-10 PROCEDURE — 99203 OFFICE O/P NEW LOW 30 MIN: CPT | Performed by: NURSE PRACTITIONER

## 2022-01-10 PROCEDURE — U0005 INFEC AGEN DETEC AMPLI PROBE: HCPCS | Performed by: NURSE PRACTITIONER

## 2022-01-10 ASSESSMENT — MIFFLIN-ST. JEOR: SCORE: 1399.41

## 2022-01-10 NOTE — PATIENT INSTRUCTIONS
[facility],      Based on your responses, you may have coronavirus (COVID-19). This illness can cause fever, cough and trouble breathing. Many people get a mild case and get better on their own. Some people can get very sick.    Will I be tested for COVID-19?  We would like to test you for COVID-19 virus. I have placed orders for this test.     To schedule: go to your eJamming home page and scroll down to the section that says  You have an appointment that needs to be scheduled  and click the large green button that says  Schedule Now  and follow the steps to find the next available openings.    If you are unable to complete these eJamming scheduling steps, please call [phone] to schedule your testing.     Return to work/school/ guidance:  Please let your workplace manager and staffing office know when your isolation ends.       If you receive a positive COVID-19 test result, follow the guidance of the those who are giving you the results. Usually the return to work is 10 days from symptom onset or positive test date, (or in some cases 20 days if you are immunocompromised). If your symptoms started after your positive test, the 10 days should start when your symptoms started.   o If you work at [employer], you must also be cleared by Employee Occupational Health and Safety to return to work.      If you receive a negative COVID-19 test result and did not have a high risk exposure to someone with a known positive COVID-19 test, you can return to work once you're free of fever for 24 hours without fever-reducing medication and your symptoms are improving or resolved.    If you receive a negative COVID-19 test and had a high-risk exposure to someone who has tested positive for COVID-19 then you can return to work 14 days after your last contact with the positive individual. Follow quarantine guidance given by your doctor or public health officials.     Sign up for GetWell Loop:  We know it's scary to hear  that you might have COVID-19. We want to track your symptoms to make sure you're okay over the next 2 weeks. Please look for an email from [employer]--this is a free, online program that we'll use to keep in touch. To sign up, follow the link in the email you will receive. Learn more at [email or web address]    How can I take care of myself?  Over the counter medications may help with your symptoms like congestion, cough, chills, or fever.     There are not many effective prescription treatments for early COVID-19. Hydroxychloroquine, ivermectin, and azithromycin are not effective or recommended for COVID-19.    If your symptoms started in the last 10 days, you may be able to receive a treatment with monoclonal antibodies. This treatment can lower your risk of severe illness and going to the hospital. It is given through an IV or under your skin (subcutaneous) and must be given at an infusion center. You must be 12 or older, weight at least 88 pounds, and have a positive COVID-19 test.     If you would like to sign up to be considered to receive the monoclonal antibody medicine, please complete a participation form through the [facility] here: MNRAP ([email or web address]/[email or web address]/[email or web address]). You may also call the [facility] COVID-19 Public Hotline at 1-189.455.1972 (open Mon-Fri: 9am-7pm and Sat: 10am-6pm).     Not all people who are eligible will receive the medicine, since supply is limited. You will be contacted in the next 1 to 2 business days only if you are selected. If you do not receive a call, you have not been selected to receive the medicine. If you have any questions about this medication, please contact your primary care provider. For more information, see [email or web address]/diseases/coronavirus/[email or web address].pdf      Get lots of rest. Drink extra fluids (unless a doctor has told you not to)    Take Tylenol (acetaminophen) or ibuprofen for fever or  pain. If you have liver or kidney problems, ask your family doctor if it's okay to take Tylenol o ibuprofen    Take over the counter medications for your symptoms, as directed by your doctor. You may also talk to your pharmacist.      If you have other health problems (like cancer, heart failure, an organ transplant or severe kidney disease): Call your specialty clinic if you don't feel better in the next 2 days.    Know when to call 911. Emergency warning signs include:  o Trouble breathing or shortness of breath  o Pain or pressure in the chest that doesn't go away  o Feeling confused like you haven't felt before, or not being able to wake up  o Bluish-colored lips or face    Where can I get more information?     [employer] [facility] - About COVID-19: [email or web address]     CDC - What to Do If You're Sick:     www.cdc.gov/coronavirus/2019-ncov/about/steps-when-sick.html    CDC - Ending Home Isolation:  https://www.cdc.gov/coronavirus/2019-ncov/your-health/quarantine-isolation.html    CDC - Caring for Someone:  www.cdc.gov/coronavirus/2019-ncov/if-you-are-sick/care-for-someone.html    [facility] clinical trials (COVID-19 research studies): clinicalaffairs.[facility]/n-clinical-trials    Below are the COVID-19 hotlines at the [facility] ([facility]). Interpreters are available.  o For health questions: Call 981-711-9050 or 1-625.771.5788 (7 a.m. to 7 p.m.)  o For questions about schools and childcare: Call 276-661-0169 or 1-901.575.2030 (7 a.m. to 7 p.m.)  January 10, 2022  RE:  Mariusz Huntley                                                                                                                  [address] [place] [address]      To whom it may concern:    I evaluated Mariusz Huntley on January 10, 2022. Mariusz Mejias should be excused from work/school.     They should let their workplace manager and staffing office know when their  quarantine ends.    We can not give an exact date as it depends on the information below. They can calculate this on their own or work with their manager/staffing office to calculate this. (For example if they were exposed on 10/04, they would have to quarantine for 14 full days. That would be through 10/18. They could return on 10/19.)    Quarantine Guidelines:    If patient receives a positive COVID-19 test result, they should follow the guidance of those who are giving the results. Usually the return to work is 10 (or in some cases 20 days from symptom onset.) If they work at [employer], they must be cleared by Employee Occupational Health and Safety to return to work.      If patient receives a negative COVID-19 test result and did not have a high risk exposure to someone with a known positive COVID-19 test, they can return to work once they're free of fever for 24 hours without fever-reducing medication and their symptoms are improving or resolved.    If patient receives a negative COVID-19 test and if they had a high risk exposure to someone who has tested positive for COVID-19 then they can return to work 14 days after their last contact with the positive individual    Note: If there is ongoing exposure to the covid positive person, this quarantine period may be longer than 14 days. (For example, if they are continually exposed to their child, who tested positive and cannot isolate from them, then the quarantine of 7-14 days can't start until their child is no longer contagious. This is typically 10 days from onset to the child's symptoms. So the total duration may be 17-24 days in this case.)     Sincerely,  Lamberto Prieto DO          o

## 2022-01-10 NOTE — PROGRESS NOTES
"SUBJECTIVE:  Mariusz Huntley is a 36 year old female with a chief complaint of sore throat.    Onset of symptoms was 1 day(s) ago.    Course of illness: still present.  Severity moderate   covid positive 4 days ago  Treatment measures tried include Tylenol/Ibuprofen, Fluids and Rest.    Past Medical History:   Diagnosis Date     Anxiety      Depression      Intractable chronic migraine without aura      Panic attack      PTSD (post-traumatic stress disorder)      Current Outpatient Medications   Medication Sig Dispense Refill     cyclobenzaprine (FLEXERIL) 10 MG tablet Take 1 tablet (10 mg) by mouth 3 times daily as needed for muscle spasms 60 tablet 0     escitalopram (LEXAPRO) 20 MG tablet Take 1 tablet (20 mg) by mouth daily 30 tablet 1     gabapentin (NEURONTIN) 800 MG tablet Take 1 tablet (800 mg) by mouth 3 times daily 90 tablet 1     ibuprofen (ADVIL/MOTRIN) 600 MG tablet Take 1 tablet (600 mg) by mouth every 6 hours as needed for moderate pain       melatonin 3 MG tablet Take 1-2 tablets (3-6 mg) by mouth nightly as needed for sleep 60 tablet 1     propranolol (INDERAL) 20 MG tablet Take 1 tablet (20 mg) by mouth 2 times daily 60 tablet 1     traZODone (DESYREL) 50 MG tablet Take 1 tablet (50 mg) by mouth nightly as needed for sleep (may repeat after 60 minutes) 30 tablet 1     EPINEPHrine (ANY BX GENERIC EQUIV) 0.3 MG/0.3ML injection 2-pack Inject 0.3 mg into the muscle as needed for anaphylaxis       Multiple Vitamins-Minerals (MULTIVITAMIN GUMMIES ADULT PO) Take 4 tablets by mouth daily       Social History     Tobacco Use     Smoking status: Never Smoker     Smokeless tobacco: Never Used   Substance Use Topics     Alcohol use: Not Currently       ROS:  Review of systems negative except as stated above.    OBJECTIVE:   /80 (BP Location: Right arm, Patient Position: Sitting, Cuff Size: Adult Regular)   Pulse 58   Temp 98.8  F (37.1  C) (Tympanic)   Ht 1.702 m (5' 7\")   Wt 67.2 kg " (148 lb 1.6 oz)   SpO2 99%   BMI 23.20 kg/m    GENERAL APPEARANCE: alert and no distress  EYES: EOMI,  PERRL, conjunctiva clear  HENT: ear canals and TM's normal.  Nose normal.  Pharynx erythematous with some exudate noted.  NECK: supple, non-tender to palpation, no adenopathy noted  RESP: lungs clear to auscultation - no rales, rhonchi or wheezes  CV: regular rates and rhythm, normal S1 S2, no murmur noted  SKIN: no suspicious lesions or rashes    Rapid Strep test is negative; await throat culture results.    ASSESSMENT:  (Z20.822) Suspected 2019 novel coronavirus infection  (primary encounter diagnosis)      PLAN:   Strep negative, culture pending  covid pending  Symptomatic treat with gargles, lozenges, and OTC analgesic as needed.   Discussed isolation and contagiousness if covid  Follow up with primary clinic if not improving.      NANDO Navarro CNP

## 2022-01-11 LAB
GROUP A STREP BY PCR: NOT DETECTED
SARS-COV-2 RNA RESP QL NAA+PROBE: POSITIVE

## 2022-03-16 NOTE — GROUP NOTE
Condition seems to be stable.  No edema.  Class III dyspnea.   Psychotherapy Group Note    PATIENT'S NAME: Mariusz Huntley  MRN:   [id number]  :   1985  ACCT. NUMBER: [id number]  DATE OF SERVICE: 3/17/21  START TIME:  2:00 PM  END TIME:  2:50 PM  FACILITATOR: Karine Mallory LICSW  TOPIC:  EBP Group: Enhanced Mindfulness  [facility] Adult Partial Hospitalization Program  TRACK: 1    NUMBER OF PARTICIPANTS: 7    Summary of Group / Topics Discussed:  Enhanced Mindfulness: Body and Mind Integration: Patients received an overview and education regarding the importance of including the body in the management of emotional health and self-care and as a direct route to mindfulness practice.  Patients discussed various ways in which the body can serve as an informant to their physical and emotional experiences/need. Patients discussed the body as a direct link to the present moment and to mindfulness practice.  Patients discussed current relationship with body, self-awareness, mindfulness practice and barriers to connection with body.  Patients were guided through breath work and movement to facilitate greater self-awareness, grounding, self-expression, and connection to other.  Patients discussed how the experiential could be applied to better manage mental health and develop joe connection to self.    Patient Session Goals / Objectives:    Identify how movement awareness could be used for grounding, stress management, self-expression, connection to other and self-regulation    Improved awareness of breath and movement preferences    Identify how movement and the body is used in mindfulness practice    Reflect on use of these practices in everyday life.    Identify barriers to attending to body    Telemedicine Visit: The patient's condition can be safely assessed and treated via synchronous audio and visual telemedicine encounter.          Reason for Telemedicine Visit: Services only offered telehealth and covid19        Originating Site (Patient Location):  Patient's home        Distant Site (Provider Location): Provider Remote Setting        Consent:  The patient/guardian has verbally consented to: the potential risks and benefits of telemedicine (video visit) versus in person care; bill my insurance or make self-payment for services provided; and responsibility for payment of non-covered services.         Mode of Communication:  Video Conference via 1-800-DENTIST        As the provider I attest to compliance with applicable laws and regulations related to telemedicine.         Patient Participation / Response:  Moderately participated, sharing some personal reflections / insights and adequately adequately received / provided feedback with other participants.    Practiced skills in session    Treatment Plan:  Patient has a current master individualized treatment plan.  See Epic treatment plan for more information.    ESME ManleySW

## 2022-04-30 ENCOUNTER — HEALTH MAINTENANCE LETTER (OUTPATIENT)
Age: 37
End: 2022-04-30

## 2022-11-19 ENCOUNTER — HEALTH MAINTENANCE LETTER (OUTPATIENT)
Age: 37
End: 2022-11-19

## 2023-06-03 NOTE — GROUP NOTE
Psychoeducation Group Note    PATIENT'S NAME: Mariusz Huntley  MRN:   [id number]  :   1985  ACCT. NUMBER: [id number]  DATE OF SERVICE: 3/15/21  START TIME:  1:00 PM  END TIME:  1:50 PM  FACILITATOR: Sheila Henderson OTR/L  TOPIC: [facility] OT Group: Lifestyle Balance and Structure  [facility]  TRACK: 1    NUMBER OF PARTICIPANTS: 7    Telemedicine Visit: The patient's condition can be safely assessed and treated via synchronous audio and visual telemedicine encounter.      Reason for Telemedicine Visit: Services only offered telehealth    Originating Site (Patient Location): Patient's home    Distant Site (Provider Location): [facility]    Consent:  The patient/guardian has verbally consented to: the potential risks and benefits of telemedicine (video visit) versus in person care; bill my insurance or make self-payment for services provided; and responsibility for payment of non-covered services.     Mode of Communication:  Video Conference via Zoom    As the provider I attest to compliance with applicable laws and regulations related to telemedicine.     Summary of Group / Topics Discussed:  Lifestyle Balance and Structure:  OT Goal-setting & integration: To support progress towards treatment goals and psychiatric recovery, group members were led through a weekly structured process to reflect on progress, integrate new learning/skills, and emerging self-awareness into their daily and weekly life. Provided psychoeducation on the neuroscience of change, self-compassion, and the anatomy of a SMART goal to the group. Facilitated the sharing of individual goals with validation, support, and feedback provided.    Patient Session Goals / Objectives:    Reflected on and create a vision for recovery and wellbeing    Identified and wrote 3 SMART goals for the week    Identified and problem solved barriers to achieving  identified goals     Identified plan to support follow through on goals and reflection on progress made        Patient Participation / Response:  Fully participated with the group by sharing personal reflections / insights and openly received / provided feedback with other participants.    Patient presentation: constricted affect; depressed mood; active in group discussion sharing ideas and examples with the group, Verbalized understanding of content and Patient would benefit from additional opportunities to practice the content to be able to generalize it to their everyday life with increased intentionality, consistency, and efficacy in support of their psychiatric recovery    Treatment Plan:  Patient has an initial individualized treatment plan that was created as part of their diagnostic assessment / admission process.  A master individualized treatment plan is in the process of being developed with the patient and multi-disciplinary care team.    Sheila Henderson OTR/L   Clear bilaterally, pupils equal, round and reactive to light.

## 2024-01-28 ENCOUNTER — HEALTH MAINTENANCE LETTER (OUTPATIENT)
Age: 39
End: 2024-01-28

## 2024-09-07 NOTE — TELEPHONE ENCOUNTER
Provider E-Visit time total (minutes): 3   Received HH referral request via choice  CM Requestor: Choice  Sent Referral per CM @ [id number]     DPA RightFax referral to [facility]

## 2025-01-15 ENCOUNTER — LAB REQUISITION (OUTPATIENT)
Dept: LAB | Facility: CLINIC | Age: 40
End: 2025-01-15
Payer: COMMERCIAL

## 2025-01-15 DIAGNOSIS — Z12.4 ENCOUNTER FOR SCREENING FOR MALIGNANT NEOPLASM OF CERVIX: ICD-10-CM

## 2025-01-15 DIAGNOSIS — Z13.1 ENCOUNTER FOR SCREENING FOR DIABETES MELLITUS: ICD-10-CM

## 2025-01-15 DIAGNOSIS — Z13.29 ENCOUNTER FOR SCREENING FOR OTHER SUSPECTED ENDOCRINE DISORDER: ICD-10-CM

## 2025-01-15 DIAGNOSIS — Z11.4 ENCOUNTER FOR SCREENING FOR HUMAN IMMUNODEFICIENCY VIRUS (HIV): ICD-10-CM

## 2025-01-15 DIAGNOSIS — Z11.59 ENCOUNTER FOR SCREENING FOR OTHER VIRAL DISEASES: ICD-10-CM

## 2025-01-15 LAB
EST. AVERAGE GLUCOSE BLD GHB EST-MCNC: 103 MG/DL
HBA1C MFR BLD: 5.2 %
HIV 1+2 AB+HIV1 P24 AG SERPL QL IA: NONREACTIVE

## 2025-01-15 PROCEDURE — 86780 TREPONEMA PALLIDUM: CPT | Mod: ORL | Performed by: OBSTETRICS & GYNECOLOGY

## 2025-01-15 PROCEDURE — G0145 SCR C/V CYTO,THINLAYER,RESCR: HCPCS | Mod: ORL | Performed by: OBSTETRICS & GYNECOLOGY

## 2025-01-15 PROCEDURE — 83036 HEMOGLOBIN GLYCOSYLATED A1C: CPT | Mod: ORL | Performed by: OBSTETRICS & GYNECOLOGY

## 2025-01-15 PROCEDURE — 87389 HIV-1 AG W/HIV-1&-2 AB AG IA: CPT | Mod: ORL | Performed by: OBSTETRICS & GYNECOLOGY

## 2025-01-15 PROCEDURE — 87340 HEPATITIS B SURFACE AG IA: CPT | Mod: ORL | Performed by: OBSTETRICS & GYNECOLOGY

## 2025-01-15 PROCEDURE — 87624 HPV HI-RISK TYP POOLED RSLT: CPT | Mod: ORL | Performed by: OBSTETRICS & GYNECOLOGY

## 2025-01-15 PROCEDURE — 84443 ASSAY THYROID STIM HORMONE: CPT | Mod: ORL | Performed by: OBSTETRICS & GYNECOLOGY

## 2025-01-15 PROCEDURE — 86803 HEPATITIS C AB TEST: CPT | Mod: ORL | Performed by: OBSTETRICS & GYNECOLOGY

## 2025-01-16 LAB
HBV SURFACE AG SERPL QL IA: NONREACTIVE
HCV AB SERPL QL IA: NONREACTIVE
HPV HR 12 DNA CVX QL NAA+PROBE: NEGATIVE
HPV16 DNA CVX QL NAA+PROBE: NEGATIVE
HPV18 DNA CVX QL NAA+PROBE: NEGATIVE
HUMAN PAPILLOMA VIRUS FINAL DIAGNOSIS: NORMAL
T PALLIDUM AB SER QL: NONREACTIVE
TSH SERPL DL<=0.005 MIU/L-ACNC: 0.85 UIU/ML (ref 0.3–4.2)

## 2025-01-20 LAB
BKR AP ASSOCIATED HPV REPORT: NORMAL
BKR LAB AP GYN ADEQUACY: NORMAL
BKR LAB AP GYN INTERPRETATION: NORMAL
BKR LAB AP LMP: NORMAL
BKR LAB AP PREVIOUS ABNL DX: NORMAL
BKR LAB AP PREVIOUS ABNORMAL: NORMAL
PATH REPORT.COMMENTS IMP SPEC: NORMAL
PATH REPORT.COMMENTS IMP SPEC: NORMAL
PATH REPORT.RELEVANT HX SPEC: NORMAL